# Patient Record
Sex: MALE | Race: WHITE | NOT HISPANIC OR LATINO | Employment: FULL TIME | URBAN - METROPOLITAN AREA
[De-identification: names, ages, dates, MRNs, and addresses within clinical notes are randomized per-mention and may not be internally consistent; named-entity substitution may affect disease eponyms.]

---

## 2018-09-12 ENCOUNTER — TELEPHONE (OUTPATIENT)
Dept: OTOLARYNGOLOGY | Facility: CLINIC | Age: 43
End: 2018-09-12

## 2018-09-12 NOTE — TELEPHONE ENCOUNTER
----- Message from Carole Escalera sent at 9/12/2018  8:09 AM CDT -----  Contact: Self [phone]  Patient will be knew and is requesting to be seen tomorrow for a sore throat and Jaw pain. Please advice

## 2018-09-20 NOTE — PROGRESS NOTES
Chief Complaint   Patient presents with    Sore Throat    Other     possible TMJ   .     HPI: Rl Grubbs is a 42 y.o. male who was self-referred for a sore throat and possible TMJ. He reports that he has an intermittent sore throat. It tends to be worse in the morning.  He states that he has not had any dysphagia. He has hoarseness upon waking.  He notes intermittent throat clearing.   He notes it is worse with chewing and has frequent jaw popping and clicking. He notes tenderness overlying the TMJ region.  He denies cough.    Also reports hearing loss bilaterally.  He states it has been present for 2 years and has been gradually worsening. He notes that he has had bilateral tinnitus. 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  He notes no alleviating or exacerbating factors.     History reviewed. No pertinent past medical history.  Social History     Socioeconomic History    Marital status:      Spouse name: Not on file    Number of children: Not on file    Years of education: Not on file    Highest education level: Not on file   Social Needs    Financial resource strain: Not on file    Food insecurity - worry: Not on file    Food insecurity - inability: Not on file    Transportation needs - medical: Not on file    Transportation needs - non-medical: Not on file   Occupational History    Not on file   Tobacco Use    Smoking status: Not on file   Substance and Sexual Activity    Alcohol use: Not on file    Drug use: Not on file    Sexual activity: Not on file   Other Topics Concern    Not on file   Social History Narrative    Not on file     History reviewed. No pertinent surgical history.  History reviewed. No pertinent family history.        Review of Systems  General: negative for chills, fever or weight loss  Psychological: negative for mood changes or depression  Ophthalmic: negative for blurry vision, photophobia or eye pain  ENT: see  HPI  Respiratory: no cough, shortness of breath, or wheezing  Cardiovascular: no chest pain or dyspnea on exertion  Gastrointestinal: no abdominal pain, change in bowel habits, or black/ bloody stools  Musculoskeletal: negative for gait disturbance or muscular weakness  Neurological: no syncope or seizures; no ataxia  Dermatological: negative for puritis,  rash and jaundice  Hematologic/lymphatic: no easy bruising, no new lumps or bumps      Physical Exam:    Vitals:    09/21/18 0834   BP: 115/83   Pulse: 81   Temp: 97.3 °F (36.3 °C)       Constitutional: Well appearing / communicating without difficutly.  NAD.  Eyes: EOM I Bilaterally  Head/Face: Normocephalic.  Negative paranasal sinus pressure/tenderness.  Salivary glands WNL.  House Brackmann I Bilaterally.    Right Ear: Auricle normal appearance. External Auditory Canal within normal limits no lesions or masses,TM w/o masses/lesions/perforations. TM mobility noted.   Left Ear: Auricle normal appearance. External Auditory Canal within normal limits no lesions or masses,TM w/o masses/lesions/perforations. TM mobility noted.  Nose: No gross nasal septal deviation. Inferior Turbinates 3+ bilaterally. No septal perforation. No masses/lesions. External nasal skin appears normal without masses/lesions.  Oral Cavity: Gingiva/lips within normal limits.  Dentition/gingiva healthy appearing. Mucus membranes moist. Floor of mouth soft, no masses palpated. Oral Tongue mobile. Hard Palate appears normal.    Oropharynx: Base of tongue appears normal. No masses/lesions noted. Tonsillar fossa/pharyngeal wall without lesions. Posterior oropharynx WNL.  Soft palate without masses. Midline uvula.   Neck/Lymphatic: No LAD I-VI bilaterally.  No thyromegaly.  No masses noted on exam.    Mirror laryngoscopy/nasopharyngoscopy: Active gag reflex.  Unable to perform.    Neuro/Psychiatric: AOx3.  Normal mood and affect.   Cardiovascular: Normal carotid pulses bilaterally, no increasing  jugular venous distention noted at cervical region bilaterally.    Respiratory: Normal respiratory effort, no stridor, no retractions noted.    See separate procedure note for FFL.         Audiogram reviewed personally by myself and in detail with the patient today.               Assessment:    ICD-10-CM ICD-9-CM    1. Laryngopharyngeal reflux (LPR) K21.9 478.79    2. Hoarseness of voice R49.0 784.42    3. Bilateral temporomandibular joint pain M26.623 524.62    4. Sensorineural hearing loss (SNHL) of both ears H90.3 389.18 COMPREHENSIVE AUDIOGRAM   5. Tinnitus aurium, bilateral H93.13 388.31      The primary encounter diagnosis was Laryngopharyngeal reflux (LPR). Diagnoses of Hoarseness of voice, Bilateral temporomandibular joint pain, Sensorineural hearing loss (SNHL) of both ears, and Tinnitus aurium, bilateral were also pertinent to this visit.      Plan:  Orders Placed This Encounter   Procedures    COMPREHENSIVE AUDIOGRAM     I had a long discussion with the patient regarding his condition and the further workup and management options.  The patient has the physical findings of chronic laryngopharyngeal reflux, which I believe is the cause of the hoarseness. I explained to the patient how it is common to experience throat discomfort, a foreign body sensation, problems swallowing, chronic cough and hoarseness among other things due to reflux, even in the absence of heartburn. Smaller volumes of gastric contents are required to irritate the pharynx than the lower esophagus. Often patients with significant reflux and heartburn will seek medical treatment earlier.     I recommended that the patient start taking Nexium 40mg twice daily and provided a prescription.    I also recommended that the patient refrain from eating within 3 hours of going to bed at night and that the patient place a 2 x 4 underneath the head board of the bed to elevate the head of bed very subtly and optimize the impact of gravity on the  potential reflux.  I recommended that the patient avoid alcohol, caffeine, tobacco, tomato sauce, spicy foods, fried food, and chocolate.  Follow up in 4-6 weeks to assess progress with treatment.     We discussed  the underlying pathology of temporomandibular joint dysfunction (TMD) as the cause of ear pain.  We further discussed conservative measures to treat TMD including avoiding gum and other foods that require lots of chewing, warm compresses, and scheduled antinflammatories.  If the pain persists, the patient will then schedule an appointment with a dentist for further evaluation.    We reviewed the patient's recent audiogram and hearing loss in detail.  We also discussed the use hearing protection when exposed to loud noise, including lawn equipment. Recommend annual audiogram. We also discussed that tinnitus is most often caused by a hearing loss, and that as the hair cells are damaged, either genetic or as a result of loud noise exposure, they then cause tinnitus.  Some patients find that restricting the salt or caffeine in their diet helps.  Tinnitus tends to be louder in times of stress and fatigue, and may decrease with time.  Sound machines may also be an effective masking technique if needed at night.    45 minutes was spent with the patient with more than half of the time spent counseling regarding above.     Lorri Hemphill MD

## 2018-09-21 ENCOUNTER — CLINICAL SUPPORT (OUTPATIENT)
Dept: OTOLARYNGOLOGY | Facility: CLINIC | Age: 43
End: 2018-09-21
Payer: COMMERCIAL

## 2018-09-21 ENCOUNTER — OFFICE VISIT (OUTPATIENT)
Dept: OTOLARYNGOLOGY | Facility: CLINIC | Age: 43
End: 2018-09-21
Payer: COMMERCIAL

## 2018-09-21 VITALS
HEART RATE: 81 BPM | SYSTOLIC BLOOD PRESSURE: 115 MMHG | TEMPERATURE: 97 F | DIASTOLIC BLOOD PRESSURE: 83 MMHG | WEIGHT: 221.56 LBS

## 2018-09-21 DIAGNOSIS — K21.9 LARYNGOPHARYNGEAL REFLUX (LPR): Primary | ICD-10-CM

## 2018-09-21 DIAGNOSIS — R49.0 HOARSENESS OF VOICE: ICD-10-CM

## 2018-09-21 DIAGNOSIS — H90.3 SENSORINEURAL HEARING LOSS (SNHL) OF BOTH EARS: ICD-10-CM

## 2018-09-21 DIAGNOSIS — H93.13 TINNITUS AURIUM, BILATERAL: ICD-10-CM

## 2018-09-21 DIAGNOSIS — M26.623 BILATERAL TEMPOROMANDIBULAR JOINT PAIN: ICD-10-CM

## 2018-09-21 DIAGNOSIS — H90.3 SENSORINEURAL HEARING LOSS, BILATERAL: Primary | ICD-10-CM

## 2018-09-21 PROCEDURE — 92557 COMPREHENSIVE HEARING TEST: CPT | Mod: S$GLB,,, | Performed by: AUDIOLOGIST-HEARING AID FITTER

## 2018-09-21 PROCEDURE — 99999 PR PBB SHADOW E&M-EST. PATIENT-LVL III: CPT | Mod: PBBFAC,,, | Performed by: OTOLARYNGOLOGY

## 2018-09-21 PROCEDURE — 92567 TYMPANOMETRY: CPT | Mod: S$GLB,,, | Performed by: AUDIOLOGIST-HEARING AID FITTER

## 2018-09-21 PROCEDURE — 99999 PR PBB SHADOW E&M-EST. PATIENT-LVL I: CPT | Mod: PBBFAC,,, | Performed by: AUDIOLOGIST-HEARING AID FITTER

## 2018-09-21 PROCEDURE — 99204 OFFICE O/P NEW MOD 45 MIN: CPT | Mod: 25,S$GLB,, | Performed by: OTOLARYNGOLOGY

## 2018-09-21 PROCEDURE — 31575 DIAGNOSTIC LARYNGOSCOPY: CPT | Mod: S$GLB,,, | Performed by: OTOLARYNGOLOGY

## 2018-09-21 RX ORDER — CETIRIZINE HYDROCHLORIDE 10 MG/1
10 TABLET ORAL DAILY
COMMUNITY

## 2018-09-21 RX ORDER — IRBESARTAN 300 MG/1
300 TABLET ORAL DAILY
Refills: 4 | COMMUNITY
Start: 2018-08-27 | End: 2019-09-26 | Stop reason: SDUPTHER

## 2018-09-21 RX ORDER — BUPROPION HYDROCHLORIDE 300 MG/1
300 TABLET ORAL DAILY
Refills: 1 | COMMUNITY
Start: 2018-08-27 | End: 2020-01-21 | Stop reason: SDUPTHER

## 2018-09-21 RX ORDER — FENOFIBRATE 160 MG/1
160 TABLET ORAL DAILY
COMMUNITY
End: 2020-02-18 | Stop reason: SDUPTHER

## 2018-09-21 RX ORDER — ESOMEPRAZOLE MAGNESIUM 40 MG/1
40 CAPSULE, DELAYED RELEASE ORAL
Qty: 60 CAPSULE | Refills: 1 | Status: SHIPPED | OUTPATIENT
Start: 2018-09-21 | End: 2019-10-28 | Stop reason: SDUPTHER

## 2018-09-21 RX ORDER — ESOMEPRAZOLE MAGNESIUM 40 MG/1
40 CAPSULE, DELAYED RELEASE ORAL DAILY
Refills: 1 | COMMUNITY
Start: 2018-08-27 | End: 2018-09-21 | Stop reason: SDUPTHER

## 2018-09-21 RX ORDER — GLUCOSAM/CHONDRO/HERB 149/HYAL 750-100 MG
1000 TABLET ORAL DAILY
COMMUNITY

## 2018-09-21 NOTE — PATIENT INSTRUCTIONS
Tips to Control Acid Reflux    To control acid reflux, youll need to make some basic diet and lifestyle changes. The simple steps outlined below may be all youll need to ease discomfort.  Watch what you eat  · Avoid fatty foods and spicy foods.  · Eat fewer acidic foods, such as citrus and tomato-based foods. These can increase symptoms.  · Limit drinking alcohol, caffeine, and fizzy beverages. All increase acid reflux.  · Try limiting chocolate, peppermint, and spearmint. These can worsen acid reflux in some people.  Watch when you eat  · Avoid lying down for 3 hours after eating.  · Do not snack before going to bed.  Raise your head  Raising your head and upper body by 4 to 6 inches helps limit reflux when youre lying down. Put blocks under the head of your bed frame to raise it.  Other changes  · Lose weight, if you need to  · Dont exercise near bedtime  · Avoid tight-fitting clothes  · Limit aspirin and ibuprofen  · Stop smoking   Date Last Reviewed: 7/1/2016 © 2000-2017 [employer]. [address] [place] [address], [place] [address]. All rights reserved. This information is not intended as a substitute for professional medical care. Always follow your healthcare professional's instructions.      How Acid Reflux Affects Your Throat    Do you have to clear your throat or cough often? Are you hoarse? Do you have trouble swallowing? If you have these or other throat symptoms, you may have acid reflux. This occurs when stomach acid flows back up and irritates your throat.  Why you have throat symptoms  There are muscles (esophageal sphincters) at both ends of the tube that carries food to your stomach (the esophagus). These muscles relax to let food pass. Then they tighten to keep stomach acid down. When the lower esophageal sphincter (LES) doesnt tighten enough, acid can flow back (reflux) from your stomach into your esophagus. This may cause heartburn. In some cases the upper esophageal  sphincter (UES) also doesnt work well. Then acid can travel higher and enter your throat (pharynx). In many cases, this causes throat symptoms.  Common throat symptoms  · Need to clear your throat often  · Feeling like youre choking  · Long-term (chronic) cough  · Hoarseness  · Trouble swallowing  · Feel like you have a lump in your throat  · Sour or acid taste  · Sore throat that keeps coming back   Date Last Reviewed: 7/1/2016  © 6769-1467 [employer]. [address] [place] [address], [place] [address]. All rights reserved. This information is not intended as a substitute for professional medical care. Always follow your healthcare professional's instructions.

## 2018-09-21 NOTE — PROGRESS NOTES
Cheli Muse, [facility]  [facility] [address]  [address]  [place] [address]      Patient: Rl Grubbs   MRN: [id number]  : 1975  BARBOZA: 2018       AUDIOLOGICAL EVALUATION    CASE HISTORY:    Rl Grubbs, 42 y.o., was seen on the above date for an audiological evaluation. Patient reported a history of occupational and recreational noise exposure. He also reported some difficulty hearing his wife and tinnitus. No further history significant to hearing loss was reported.    TEST RESULTS:    Otoscopy was unremarkable for both ears. Imittance testing revealed normal middle ear compliance (Type A) in both ears. Speech reception thresholds were obtained at 10 dB HL and 5 dB HL, in the right and left ears, respectively, which was consistent with pure tone results. A word recognition score of 100% was obtained in the right ear using a presentation level of 50 dBHL. A left ear word recognition score of 96% correct was obtained using a presentation level of 45 dBHL.     IMPRESSION:   Audiological testing indicated that Rl Grubbs has a mild to moderate, high-frequency sensorineural hearing loss in both ears.    RECOMMENDATIONS:   It is recommended that he:  Continue to receive audiological monitoring annually.  Consider binaural hearing aids in the future.  Wear hearing protection when around loud noises.     If you should have any questions or concerns regarding the above information, please do not hesitate to contact me at [phone].      _______________________________  Yamile Muse, CCC-A  Clinical Audiologist    enclosure: audiogram

## 2018-09-21 NOTE — PROCEDURES
Procedures     Due to indication in patient's history, presentation or risk factors,  a fiber optic exam was performed.    SEPARATE PROCEDURE NOTE:    ANESTHESIA:  Topical xylocaine with hieu-synephrine    FINDINGS:  Moderate inaterarytenoid erythema and edema    PROCEDURE:  After verbal consent was obtained, the flexible scope was passed through the patient's nasal cavity without difficulty.  The nasopharynx (adenoid pad) and eustachian tube orifices were first visualized and were found to be normal, without masses or irregularity.  The posterior pharyngeal wall and base of tongue were then examined and no mass or irregular tissue was seen.  The scope was then advanced to the larynx, and the epiglottis, valleculae, and piriform sinuses were normal, without masses or mucosal irregularity.  The false vocal folds and true vocal folds were then examined and were found to have normal mobility (full abduction and adduction) and no masses or mucosal irregularity was seen.  The interartyenoid area had moderate  edema and erythema consistent with reflux.

## 2019-08-05 ENCOUNTER — PATIENT MESSAGE (OUTPATIENT)
Dept: FAMILY MEDICINE | Facility: CLINIC | Age: 44
End: 2019-08-05

## 2019-08-15 ENCOUNTER — PATIENT MESSAGE (OUTPATIENT)
Dept: FAMILY MEDICINE | Facility: CLINIC | Age: 44
End: 2019-08-15

## 2019-09-03 ENCOUNTER — PATIENT MESSAGE (OUTPATIENT)
Dept: FAMILY MEDICINE | Facility: CLINIC | Age: 44
End: 2019-09-03

## 2019-09-05 ENCOUNTER — OFFICE VISIT (OUTPATIENT)
Dept: FAMILY MEDICINE | Facility: CLINIC | Age: 44
End: 2019-09-05
Payer: COMMERCIAL

## 2019-09-05 VITALS
OXYGEN SATURATION: 98 % | RESPIRATION RATE: 18 BRPM | SYSTOLIC BLOOD PRESSURE: 124 MMHG | BODY MASS INDEX: 33.79 KG/M2 | DIASTOLIC BLOOD PRESSURE: 80 MMHG | TEMPERATURE: 98 F | WEIGHT: 241.38 LBS | HEART RATE: 82 BPM | HEIGHT: 71 IN

## 2019-09-05 DIAGNOSIS — F33.0 DEPRESSION, MAJOR, RECURRENT, MILD: Primary | ICD-10-CM

## 2019-09-05 DIAGNOSIS — G47.33 OSA (OBSTRUCTIVE SLEEP APNEA): ICD-10-CM

## 2019-09-05 DIAGNOSIS — K21.9 GASTROESOPHAGEAL REFLUX DISEASE WITHOUT ESOPHAGITIS: ICD-10-CM

## 2019-09-05 DIAGNOSIS — I10 BENIGN ESSENTIAL HYPERTENSION: ICD-10-CM

## 2019-09-05 DIAGNOSIS — Z12.5 PROSTATE CANCER SCREENING: ICD-10-CM

## 2019-09-05 DIAGNOSIS — E78.2 MIXED HYPERLIPIDEMIA: ICD-10-CM

## 2019-09-05 PROCEDURE — 3008F PR BODY MASS INDEX (BMI) DOCUMENTED: ICD-10-PCS | Mod: CPTII,S$GLB,, | Performed by: INTERNAL MEDICINE

## 2019-09-05 PROCEDURE — 99213 PR OFFICE/OUTPT VISIT, EST, LEVL III, 20-29 MIN: ICD-10-PCS | Mod: S$GLB,,, | Performed by: INTERNAL MEDICINE

## 2019-09-05 PROCEDURE — 3008F BODY MASS INDEX DOCD: CPT | Mod: CPTII,S$GLB,, | Performed by: INTERNAL MEDICINE

## 2019-09-05 PROCEDURE — 99999 PR PBB SHADOW E&M-EST. PATIENT-LVL III: ICD-10-PCS | Mod: PBBFAC,,, | Performed by: INTERNAL MEDICINE

## 2019-09-05 PROCEDURE — 99213 OFFICE O/P EST LOW 20 MIN: CPT | Mod: S$GLB,,, | Performed by: INTERNAL MEDICINE

## 2019-09-05 PROCEDURE — 99999 PR PBB SHADOW E&M-EST. PATIENT-LVL III: CPT | Mod: PBBFAC,,, | Performed by: INTERNAL MEDICINE

## 2019-09-05 NOTE — PROGRESS NOTES
[facility] Note    Chief Complaint      Chief Complaint   Patient presents with    Follow-up     6 month follow up        History of Present Illness      Rl Grubbs is a 43 y.o. male who presents today for   Chief Complaint   Patient presents with    Follow-up     6 month follow up    .  Patient comes to appointment for 6 m checkup for chronic issues as discussed below .  He is compliant  with all meds and diet . Is very active with activity with his job . All health maint is uptodate .     HPI    No problem-specific Assessment & Plan notes found for this encounter.       Problem List Items Addressed This Visit        Psychiatric    Depression, major, recurrent, mild - Primary    Overview     Cont welbutrin xl 300 mg is working and tolerating well             Cardiac/Vascular    Benign essential hypertension    Overview     bp well controled cont all current meds all reviewed          Mixed hyperlipidemia    Overview     Needs cmp and lipid cont fenofibrate             Renal/    Prostate cancer screening    Overview     c heck psa             GI    Gastroesophageal reflux disease without esophagitis    Overview     Cont nexium as needed working well            Other    TOBIN (obstructive sleep apnea)    Overview     Utilizing cpap with great benefit all night every night                  Past Medical History:  History reviewed. No pertinent past medical history.    Past Surgical History:  History reviewed. No pertinent surgical history.    Family History:  family history is not on file.     Social History:  Social History     Socioeconomic History    Marital status:      Spouse name: Not on file    Number of children: Not on file    Years of education: Not on file    Highest education level: Not on file   Occupational History    Not on file   Social Needs    Financial resource strain: Not hard at all    Food insecurity:     Worry: Never true     Inability:  Never true    Transportation needs:     Medical: No     Non-medical: No   Tobacco Use    Smoking status: Never Smoker    Smokeless tobacco: Never Used   Substance and Sexual Activity    Alcohol use: Not on file    Drug use: Never    Sexual activity: Yes     Partners: Female   Lifestyle    Physical activity:     Days per week: 2 days     Minutes per session: 90 min    Stress: Not at all   Relationships    Social connections:     Talks on phone: Twice a week     Gets together: Once a week     Attends Denominational service: Not on file     Active member of club or organization: No     Attends meetings of clubs or organizations: Not on file     Relationship status:    Other Topics Concern    Not on file   Social History Narrative    Not on file       Review of Systems:   Review of Systems   HENT: Negative for hearing loss.    Eyes: Negative for discharge.   Respiratory: Negative for wheezing.    Cardiovascular: Negative for chest pain and palpitations.   Gastrointestinal: Negative for blood in stool, constipation, diarrhea and vomiting.   Genitourinary: Negative for hematuria and urgency.   Musculoskeletal: Negative for neck pain.   Neurological: Negative for weakness and headaches.   Endo/Heme/Allergies: Negative for polydipsia.        Medications:  Outpatient Encounter Medications as of 9/5/2019   Medication Sig Dispense Refill    buPROPion (WELLBUTRIN XL) 300 MG 24 hr tablet Take 300 mg by mouth once daily.  1    cetirizine (ZYRTEC) 10 MG tablet Take 10 mg by mouth once daily.      esomeprazole (NEXIUM) 40 MG capsule Take 1 capsule (40 mg total) by mouth 2 (two) times daily before meals. 60 capsule 1    fenofibrate 160 MG Tab Take 160 mg by mouth once daily.      irbesartan (AVAPRO) 300 MG tablet Take 300 mg by mouth once daily.  4    omega 3-dha-epa-fish oil (FISH OIL) 1,000 mg (120 mg-180 mg) Cap Take 1,000 mg by mouth once daily.       No facility-administered encounter medications on file as  "of 9/5/2019.        Allergies:  Review of patient's allergies indicates:  No Known Allergies      Physical Exam      Vitals:    09/05/19 1421   BP: 124/80   Pulse: 82   Resp: 18   Temp: 98.1 °F (36.7 °C)        Vital Signs  Temp: 98.1 °F (36.7 °C)  Temp src: Oral  Pulse: 82  Resp: 18  SpO2: 98 %  BP: 124/80  BP Location: Right arm  Patient Position: Sitting  Pain Score: 0-No pain  Height and Weight  Height: 5' 11" (180.3 cm)  Weight: 109.5 kg (241 lb 6.5 oz)  BSA (Calculated - sq m): 2.34 sq meters  BMI (Calculated): 33.7  Weight in (lb) to have BMI = 25: 178.9]     Body mass index is 33.67 kg/m².    Physical Exam   Constitutional: He is oriented to person, place, and time. He appears well-developed and well-nourished.   HENT:   Head: Normocephalic.   Eyes: Pupils are equal, round, and reactive to light.   Neck: Normal range of motion. No thyromegaly present.   Cardiovascular: Normal rate and regular rhythm. Exam reveals no gallop and no friction rub.   No murmur heard.  Pulmonary/Chest: Effort normal and breath sounds normal.   Abdominal: Soft. Bowel sounds are normal. He exhibits no distension. There is no hepatosplenomegaly. There is no tenderness.   Musculoskeletal: Normal range of motion. He exhibits tenderness. He exhibits no edema.   tmj   Neurological: He is alert and oriented to person, place, and time. No sensory deficit.   Skin: Skin is warm and dry.   Psychiatric: He has a normal mood and affect. His behavior is normal.        Laboratory:  CBC:  No results for input(s): WBC, RBC, HGB, HCT, PLT, MCV, MCH, MCHC in the last 2160 hours.  CMP:  No results for input(s): GLU, CALCIUM, ALBUMIN, PROT, NA, K, CO2, CL, BUN, ALKPHOS, ALT, AST, BILITOT in the last 2160 hours.    Invalid input(s): CREATININ  URINALYSIS:  No results for input(s): COLORU, CLARITYU, SPECGRAV, PHUR, PROTEINUA, GLUCOSEU, BILIRUBINCON, BLOODU, WBCU, RBCU, BACTERIA, MUCUS, NITRITE, LEUKOCYTESUR, UROBILINOGEN, HYALINECASTS in the last 2160 " hours.   LIPIDS:  No results for input(s): TSH, HDL, CHOL, TRIG, LDLCALC, CHOLHDL, NONHDLCHOL, TOTALCHOLEST in the last 2160 hours.  TSH:  No results for input(s): TSH in the last 2160 hours.  A1C:  No results for input(s): HGBA1C in the last 2160 hours.    Radiology:        Assessment:     Rl Grubbs is a 43 y.o.male with:    Depression, major, recurrent, mild    Mixed hyperlipidemia  -     Lipid panel; Future; Expected date: 09/05/2019  -     Comprehensive metabolic panel; Future; Expected date: 09/05/2019    Benign essential hypertension  -     CBC auto differential; Future; Expected date: 09/05/2019    Gastroesophageal reflux disease without esophagitis    Prostate cancer screening  -     PSA, Screening; Future; Expected date: 09/05/2019    TOBIN (obstructive sleep apnea)                Plan:     As above, continue current medications and maintain follow up with specialists.  Return to clinic in 6 months.    Frederick W Dantagnan [facility]                Answers for HPI/ROS submitted by the patient on 9/3/2019   activity change: No  unexpected weight change: No  rhinorrhea: No  trouble swallowing: No  visual disturbance: No  chest tightness: No  polyuria: No  difficulty urinating: No  joint swelling: No  arthralgias: No  confusion: No  dysphoric mood: No

## 2019-09-10 ENCOUNTER — TELEPHONE (OUTPATIENT)
Dept: FAMILY MEDICINE | Facility: CLINIC | Age: 44
End: 2019-09-10

## 2019-09-10 LAB
ALBUMIN SERPL-MCNC: 4.1 G/DL (ref 3.6–5.1)
ALBUMIN/GLOB SERPL: 1.4 (CALC) (ref 1–2.5)
ALP SERPL-CCNC: 45 U/L (ref 40–115)
ALT SERPL-CCNC: 22 U/L (ref 9–46)
AST SERPL-CCNC: 15 U/L (ref 10–40)
BASOPHILS # BLD AUTO: 58 CELLS/UL (ref 0–200)
BASOPHILS NFR BLD AUTO: 1 %
BILIRUB SERPL-MCNC: 0.6 MG/DL (ref 0.2–1.2)
BUN SERPL-MCNC: 14 MG/DL (ref 7–25)
BUN/CREAT SERPL: ABNORMAL (CALC) (ref 6–22)
CALCIUM SERPL-MCNC: 9.4 MG/DL (ref 8.6–10.3)
CHLORIDE SERPL-SCNC: 101 MMOL/L (ref 98–110)
CHOLEST SERPL-MCNC: 156 MG/DL
CHOLEST/HDLC SERPL: 3.3 (CALC)
CO2 SERPL-SCNC: 28 MMOL/L (ref 20–32)
CREAT SERPL-MCNC: 1.13 MG/DL (ref 0.6–1.35)
EOSINOPHIL # BLD AUTO: 336 CELLS/UL (ref 15–500)
EOSINOPHIL NFR BLD AUTO: 5.8 %
ERYTHROCYTE [DISTWIDTH] IN BLOOD BY AUTOMATED COUNT: 13.1 % (ref 11–15)
GFRSERPLBLD MDRD-ARVRAT: 78 ML/MIN/1.73M2
GLOBULIN SER CALC-MCNC: 2.9 G/DL (CALC) (ref 1.9–3.7)
GLUCOSE SERPL-MCNC: 103 MG/DL (ref 65–99)
HCT VFR BLD AUTO: 43.1 % (ref 38.5–50)
HDLC SERPL-MCNC: 47 MG/DL
HGB BLD-MCNC: 14 G/DL (ref 13.2–17.1)
LDLC SERPL CALC-MCNC: 85 MG/DL (CALC)
LYMPHOCYTES # BLD AUTO: 2059 CELLS/UL (ref 850–3900)
LYMPHOCYTES NFR BLD AUTO: 35.5 %
MCH RBC QN AUTO: 27.8 PG (ref 27–33)
MCHC RBC AUTO-ENTMCNC: 32.5 G/DL (ref 32–36)
MCV RBC AUTO: 85.5 FL (ref 80–100)
MONOCYTES # BLD AUTO: 470 CELLS/UL (ref 200–950)
MONOCYTES NFR BLD AUTO: 8.1 %
NEUTROPHILS # BLD AUTO: 2877 CELLS/UL (ref 1500–7800)
NEUTROPHILS NFR BLD AUTO: 49.6 %
NONHDLC SERPL-MCNC: 109 MG/DL (CALC)
PLATELET # BLD AUTO: 227 THOUSAND/UL (ref 140–400)
PMV BLD REES-ECKER: 9.1 FL (ref 7.5–12.5)
POTASSIUM SERPL-SCNC: 4.3 MMOL/L (ref 3.5–5.3)
PROT SERPL-MCNC: 7 G/DL (ref 6.1–8.1)
PSA SERPL-MCNC: 0.4 NG/ML
RBC # BLD AUTO: 5.04 MILLION/UL (ref 4.2–5.8)
SODIUM SERPL-SCNC: 137 MMOL/L (ref 135–146)
TRIGL SERPL-MCNC: 147 MG/DL
WBC # BLD AUTO: 5.8 THOUSAND/UL (ref 3.8–10.8)

## 2019-09-10 NOTE — TELEPHONE ENCOUNTER
----- Message from Evan Sanford sent at 9/10/2019  8:33 AM CDT -----  Contact: [phone]/self  Patient states he's returning your call   Please call back to assist at [phone]

## 2019-09-26 ENCOUNTER — PATIENT MESSAGE (OUTPATIENT)
Dept: FAMILY MEDICINE | Facility: CLINIC | Age: 44
End: 2019-09-26

## 2019-09-26 RX ORDER — IRBESARTAN 300 MG/1
300 TABLET ORAL DAILY
Qty: 30 TABLET | Refills: 5 | Status: SHIPPED | OUTPATIENT
Start: 2019-09-26 | End: 2020-03-18 | Stop reason: SDUPTHER

## 2019-10-28 RX ORDER — ESOMEPRAZOLE MAGNESIUM 40 MG/1
40 CAPSULE, DELAYED RELEASE ORAL DAILY
Qty: 30 CAPSULE | Refills: 3 | Status: SHIPPED | OUTPATIENT
Start: 2019-10-28 | End: 2020-02-24 | Stop reason: SDUPTHER

## 2020-01-21 ENCOUNTER — PATIENT MESSAGE (OUTPATIENT)
Dept: FAMILY MEDICINE | Facility: CLINIC | Age: 45
End: 2020-01-21

## 2020-01-21 RX ORDER — BUPROPION HYDROCHLORIDE 300 MG/1
300 TABLET ORAL DAILY
Qty: 30 TABLET | Refills: 5 | Status: SHIPPED | OUTPATIENT
Start: 2020-01-21 | End: 2020-03-18 | Stop reason: SDUPTHER

## 2020-01-27 ENCOUNTER — PATIENT MESSAGE (OUTPATIENT)
Dept: FAMILY MEDICINE | Facility: CLINIC | Age: 45
End: 2020-01-27

## 2020-01-27 DIAGNOSIS — M54.16 LUMBAR RADICULOPATHY: Primary | ICD-10-CM

## 2020-01-28 ENCOUNTER — TELEPHONE (OUTPATIENT)
Dept: FAMILY MEDICINE | Facility: CLINIC | Age: 45
End: 2020-01-28

## 2020-01-28 NOTE — TELEPHONE ENCOUNTER
Spoke with pt regarding scheduling referral appointment. Pt stated that he needs to look at schedule to schedule appointment. Pt stated that he will schedule appt on mychart. Informed pt that he may or may not be able to schedule appt on aydee. Pt stated that if he can't schedule on aydee, he will give the office a call.

## 2020-01-28 NOTE — TELEPHONE ENCOUNTER
----- Message from Kendrick Burkett sent at 1/28/2020 10:32 AM CST -----  Contact: [phone] / self   Patient is returning a call from your office. Please Advise.

## 2020-01-29 ENCOUNTER — OFFICE VISIT (OUTPATIENT)
Dept: URGENT CARE | Facility: CLINIC | Age: 45
End: 2020-01-29
Payer: COMMERCIAL

## 2020-01-29 VITALS
TEMPERATURE: 98 F | RESPIRATION RATE: 18 BRPM | WEIGHT: 240 LBS | DIASTOLIC BLOOD PRESSURE: 78 MMHG | BODY MASS INDEX: 33.6 KG/M2 | OXYGEN SATURATION: 99 % | HEIGHT: 71 IN | SYSTOLIC BLOOD PRESSURE: 123 MMHG | HEART RATE: 79 BPM

## 2020-01-29 DIAGNOSIS — B96.89 ACUTE BACTERIAL SINUSITIS: Primary | ICD-10-CM

## 2020-01-29 DIAGNOSIS — J01.90 ACUTE BACTERIAL SINUSITIS: Primary | ICD-10-CM

## 2020-01-29 LAB
CTP QC/QA: YES
FLUAV AG NPH QL: NEGATIVE
FLUBV AG NPH QL: NEGATIVE

## 2020-01-29 PROCEDURE — 96372 PR INJECTION,THERAP/PROPH/DIAG2ST, IM OR SUBCUT: ICD-10-PCS | Mod: S$GLB,,, | Performed by: FAMILY MEDICINE

## 2020-01-29 PROCEDURE — 87804 INFLUENZA ASSAY W/OPTIC: CPT | Mod: QW,S$GLB,, | Performed by: FAMILY MEDICINE

## 2020-01-29 PROCEDURE — 99214 OFFICE O/P EST MOD 30 MIN: CPT | Mod: 25,S$GLB,, | Performed by: FAMILY MEDICINE

## 2020-01-29 PROCEDURE — 99214 PR OFFICE/OUTPT VISIT, EST, LEVL IV, 30-39 MIN: ICD-10-PCS | Mod: 25,S$GLB,, | Performed by: FAMILY MEDICINE

## 2020-01-29 PROCEDURE — 87804 POCT INFLUENZA A/B: ICD-10-PCS | Mod: 59,QW,S$GLB, | Performed by: FAMILY MEDICINE

## 2020-01-29 PROCEDURE — 96372 THER/PROPH/DIAG INJ SC/IM: CPT | Mod: S$GLB,,, | Performed by: FAMILY MEDICINE

## 2020-01-29 RX ORDER — FLUTICASONE PROPIONATE 50 MCG
1 SPRAY, SUSPENSION (ML) NASAL DAILY
Qty: 9.9 ML | Refills: 0 | Status: SHIPPED | OUTPATIENT
Start: 2020-01-29 | End: 2020-09-10

## 2020-01-29 RX ORDER — AMOXICILLIN AND CLAVULANATE POTASSIUM 875; 125 MG/1; MG/1
1 TABLET, FILM COATED ORAL 2 TIMES DAILY
Qty: 20 TABLET | Refills: 0 | Status: SHIPPED | OUTPATIENT
Start: 2020-01-29 | End: 2020-02-08

## 2020-01-29 RX ORDER — BETAMETHASONE SODIUM PHOSPHATE AND BETAMETHASONE ACETATE 3; 3 MG/ML; MG/ML
6 INJECTION, SUSPENSION INTRA-ARTICULAR; INTRALESIONAL; INTRAMUSCULAR; SOFT TISSUE
Status: COMPLETED | OUTPATIENT
Start: 2020-01-29 | End: 2020-01-29

## 2020-01-29 RX ADMIN — BETAMETHASONE SODIUM PHOSPHATE AND BETAMETHASONE ACETATE 6 MG: 3; 3 INJECTION, SUSPENSION INTRA-ARTICULAR; INTRALESIONAL; INTRAMUSCULAR; SOFT TISSUE at 11:01

## 2020-01-29 NOTE — PATIENT INSTRUCTIONS
Sinusitis (Antibiotic Treatment)    The sinuses are air-filled spaces within the bones of the face. They connect to the inside of the nose. Sinusitis is an inflammation of the tissue lining the sinus cavity. Sinus inflammation can occur during a cold. It can also be due to allergies to pollens and other particles in the air. Sinusitis can cause symptoms of sinus congestion and fullness. A sinus infection causes fever, headache and facial pain. There is often green or yellow drainage from the nose or into the back of the throat (post-nasal drip). You have been given antibiotics to treat this condition.  Home care:  · Take the full course of antibiotics as instructed. Do not stop taking them, even if you feel better.  · Drink plenty of water, hot tea, and other liquids. This may help thin mucus. It also may promote sinus drainage.  · Heat may help soothe painful areas of the face. Use a towel soaked in hot water. Or,  the shower and direct the hot spray onto your face. Using a vaporizer along with a menthol rub at night may also help.   · An expectorant containing guaifenesin may help thin the mucus and promote drainage from the sinuses.  · Over-the-counter decongestants may be used unless a similar medicine was prescribed. Nasal sprays work the fastest. Use one that contains phenylephrine or oxymetazoline. First blow the nose gently. Then use the spray. Do not use these medicines more often than directed on the label or symptoms may get worse. You may also use tablets containing pseudoephedrine. Avoid products that combine ingredients, because side effects may be increased. Read labels. You can also ask the pharmacist for help. (NOTE: Persons with high blood pressure should not use decongestants. They can raise blood pressure.)  · Over-the-counter antihistamines may help if allergies contributed to your sinusitis.    · Do not use nasal rinses or irrigation during an acute sinus infection, unless told to by  your health care provider. Rinsing may spread the infection to other sinuses.  · Use acetaminophen or ibuprofen to control pain, unless another pain medicine was prescribed. (If you have chronic liver or kidney disease or ever had a stomach ulcer, talk with your doctor before using these medicines. Aspirin should never be used in anyone under 18 years of age who is ill with a fever. It may cause severe liver damage.)  · Don't smoke. This can worsen symptoms.  Follow-up care  Follow up with your healthcare provider or our staff if you are not improving within the next week.  When to seek medical advice  Call your healthcare provider if any of these occur:  · Facial pain or headache becoming more severe  · Stiff neck  · Unusual drowsiness or confusion  · Swelling of the forehead or eyelids  · Vision problems, including blurred or double vision  · Fever of 100.4ºF (38ºC) or higher, or as directed by your healthcare provider  · Seizure  · Breathing problems  · Symptoms not resolving within 10 days  Date Last Reviewed: 4/13/2015  © 0566-0209 [employer]. [address] [place] [address], [place] [address]. All rights reserved. This information is not intended as a substitute for professional medical care. Always follow your healthcare professional's instructions.

## 2020-01-29 NOTE — PROGRESS NOTES
"Subjective:       Patient ID: Rl Grubbs is a 44 y.o. male.    Vitals:  height is 5' 11" (1.803 m) and weight is 108.9 kg (240 lb). His temperature is 97.8 °F (36.6 °C). His blood pressure is 123/78 and his pulse is 79. His respiration is 18 and oxygen saturation is 99%.     Chief Complaint: Cough and Sinus Problem    Patient presents with symptoms that started on Sunday sore throat, sinus pressure, ear pain and clogged low grade fever last. Patient has had a flu vaccine this year.     Otalgia    There is pain in both ears. This is a new problem. The current episode started in the past 7 days. The problem occurs constantly. The problem has been gradually worsening. The maximum temperature recorded prior to his arrival was 100.4 - 100.9 F. The pain is mild. Associated symptoms include coughing, headaches, rhinorrhea and a sore throat. Pertinent negatives include no rash or vomiting. He has tried nothing for the symptoms. The treatment provided no relief.       Constitution: Positive for fever. Negative for chills, sweating and fatigue.   HENT: Positive for ear pain, congestion, sinus pain, sinus pressure, sore throat and voice change.    Neck: Positive for painful lymph nodes.   Eyes: Negative for eye redness.   Respiratory: Positive for cough. Negative for chest tightness, sputum production, bloody sputum, COPD, shortness of breath, stridor, wheezing and asthma.    Gastrointestinal: Negative for nausea and vomiting.   Musculoskeletal: Negative for muscle ache.   Skin: Negative for rash.   Allergic/Immunologic: Negative for seasonal allergies and asthma.   Neurological: Positive for headaches.   Hematologic/Lymphatic: Positive for swollen lymph nodes.       Objective:      Physical Exam   Constitutional: He appears well-developed and well-nourished.   HENT:   Head: Normocephalic and atraumatic.   Right Ear: Tympanic membrane is bulging.   Left Ear: Tympanic membrane is bulging.   Nose: Mucosal edema and " rhinorrhea present.   Mouth/Throat: Posterior oropharyngeal erythema present.   Eyes: Pupils are equal, round, and reactive to light. EOM are normal.   Neck: Normal range of motion. Neck supple.   Cardiovascular: Normal rate, regular rhythm and normal heart sounds.   Pulmonary/Chest: Effort normal.   Abdominal: Soft.   Nursing note and vitals reviewed.    Results for orders placed or performed in visit on 01/29/20   POCT Influenza A/B   Result Value Ref Range    Rapid Influenza A Ag Negative Negative    Rapid Influenza B Ag Negative Negative     Acceptable Yes          Assessment:       1. Acute bacterial sinusitis        Plan:         Acute bacterial sinusitis  -     POCT Influenza A/B  -     fluticasone propionate (FLONASE) 50 mcg/actuation nasal spray; 1 spray (50 mcg total) by Each Nostril route once daily.  Dispense: 9.9 mL; Refill: 0  -     amoxicillin-clavulanate 875-125mg (AUGMENTIN) 875-125 mg per tablet; Take 1 tablet by mouth 2 (two) times daily. for 10 days  Dispense: 20 tablet; Refill: 0  -     betamethasone acetate-betamethasone sodium phosphate injection 6 mg

## 2020-02-18 ENCOUNTER — PATIENT MESSAGE (OUTPATIENT)
Dept: FAMILY MEDICINE | Facility: CLINIC | Age: 45
End: 2020-02-18

## 2020-02-18 RX ORDER — FENOFIBRATE 160 MG/1
160 TABLET ORAL DAILY
Qty: 30 TABLET | Refills: 5 | Status: SHIPPED | OUTPATIENT
Start: 2020-02-18 | End: 2020-03-18 | Stop reason: SDUPTHER

## 2020-02-24 RX ORDER — ESOMEPRAZOLE MAGNESIUM 40 MG/1
40 CAPSULE, DELAYED RELEASE ORAL DAILY
Qty: 90 CAPSULE | Refills: 0 | Status: SHIPPED | OUTPATIENT
Start: 2020-02-24 | End: 2020-05-22 | Stop reason: SDUPTHER

## 2020-03-05 ENCOUNTER — OFFICE VISIT (OUTPATIENT)
Dept: FAMILY MEDICINE | Facility: CLINIC | Age: 45
End: 2020-03-05
Payer: COMMERCIAL

## 2020-03-05 VITALS
TEMPERATURE: 99 F | HEART RATE: 95 BPM | WEIGHT: 247.56 LBS | OXYGEN SATURATION: 95 % | SYSTOLIC BLOOD PRESSURE: 124 MMHG | BODY MASS INDEX: 34.53 KG/M2 | DIASTOLIC BLOOD PRESSURE: 86 MMHG

## 2020-03-05 DIAGNOSIS — K43.9 VENTRAL HERNIA WITHOUT OBSTRUCTION OR GANGRENE: ICD-10-CM

## 2020-03-05 DIAGNOSIS — F33.0 DEPRESSION, MAJOR, RECURRENT, MILD: ICD-10-CM

## 2020-03-05 DIAGNOSIS — E78.2 MIXED HYPERLIPIDEMIA: ICD-10-CM

## 2020-03-05 DIAGNOSIS — I10 BENIGN ESSENTIAL HYPERTENSION: Primary | ICD-10-CM

## 2020-03-05 DIAGNOSIS — G47.33 OSA (OBSTRUCTIVE SLEEP APNEA): ICD-10-CM

## 2020-03-05 DIAGNOSIS — K21.9 GASTROESOPHAGEAL REFLUX DISEASE WITHOUT ESOPHAGITIS: ICD-10-CM

## 2020-03-05 DIAGNOSIS — M54.50 LUMBAR BACK PAIN: ICD-10-CM

## 2020-03-05 PROCEDURE — 3079F PR MOST RECENT DIASTOLIC BLOOD PRESSURE 80-89 MM HG: ICD-10-PCS | Mod: CPTII,S$GLB,, | Performed by: INTERNAL MEDICINE

## 2020-03-05 PROCEDURE — 3074F PR MOST RECENT SYSTOLIC BLOOD PRESSURE < 130 MM HG: ICD-10-PCS | Mod: CPTII,S$GLB,, | Performed by: INTERNAL MEDICINE

## 2020-03-05 PROCEDURE — 3008F BODY MASS INDEX DOCD: CPT | Mod: CPTII,S$GLB,, | Performed by: INTERNAL MEDICINE

## 2020-03-05 PROCEDURE — 3008F PR BODY MASS INDEX (BMI) DOCUMENTED: ICD-10-PCS | Mod: CPTII,S$GLB,, | Performed by: INTERNAL MEDICINE

## 2020-03-05 PROCEDURE — 99214 PR OFFICE/OUTPT VISIT, EST, LEVL IV, 30-39 MIN: ICD-10-PCS | Mod: S$GLB,,, | Performed by: INTERNAL MEDICINE

## 2020-03-05 PROCEDURE — 99214 OFFICE O/P EST MOD 30 MIN: CPT | Mod: S$GLB,,, | Performed by: INTERNAL MEDICINE

## 2020-03-05 PROCEDURE — 99999 PR PBB SHADOW E&M-EST. PATIENT-LVL IV: ICD-10-PCS | Mod: PBBFAC,,, | Performed by: INTERNAL MEDICINE

## 2020-03-05 PROCEDURE — 3079F DIAST BP 80-89 MM HG: CPT | Mod: CPTII,S$GLB,, | Performed by: INTERNAL MEDICINE

## 2020-03-05 PROCEDURE — 99999 PR PBB SHADOW E&M-EST. PATIENT-LVL IV: CPT | Mod: PBBFAC,,, | Performed by: INTERNAL MEDICINE

## 2020-03-05 PROCEDURE — 3074F SYST BP LT 130 MM HG: CPT | Mod: CPTII,S$GLB,, | Performed by: INTERNAL MEDICINE

## 2020-03-05 NOTE — PROGRESS NOTES
[facility] Note    Chief Complaint      Chief Complaint   Patient presents with    Follow-up     6m f/u on depression, HTN, HLD        History of Present Illness      Rl Grubbs is a 44 y.o. male who presents today for   Chief Complaint   Patient presents with    Follow-up     6m f/u on depression, HTN, HLD    .  Patient comes to appointment here for 6m checkup for chronic issues as discussed in detail below . Is feeling well is compliant with all meds and diet . Main issue is with hernia that he would like referral for eval and treat . He is also having issues with low back pain .     HPI    No problem-specific Assessment & Plan notes found for this encounter.       Problem List Items Addressed This Visit        Psychiatric    Depression, major, recurrent, mild    Overview     Cont welbutrin xl 300 mg is working and tolerating well             Cardiac/Vascular    Benign essential hypertension - Primary    Overview     bp well controled cont all current meds all reviewed          Mixed hyperlipidemia    Overview     Cont  fenofibrate tolerating well             GI    Gastroesophageal reflux disease without esophagitis    Overview     Cont nexium as needed working well         Ventral hernia without obstruction or gangrene    Overview     Needs referral to gen surgeon for eval and treat .            Orthopedic    Lumbar back pain    Overview     Ortho referral for eval and treat             Other    TOBIN (obstructive sleep apnea)    Overview     Utilizing cpap with great benefit all night every night                  Past Medical History:  History reviewed. No pertinent past medical history.    Past Surgical History:  History reviewed. No pertinent surgical history.    Family History:  family history includes Asthma in his mother; COPD in his mother; Cancer in his father; Hearing loss in his mother; Heart disease in his brother and mother; Hypertension in his brother and  mother; Kidney disease in his mother.     Social History:  Social History     Socioeconomic History    Marital status:      Spouse name: Not on file    Number of children: Not on file    Years of education: Not on file    Highest education level: Not on file   Occupational History    Not on file   Social Needs    Financial resource strain: Not hard at all    Food insecurity:     Worry: Never true     Inability: Never true    Transportation needs:     Medical: No     Non-medical: No   Tobacco Use    Smoking status: Never Smoker    Smokeless tobacco: Never Used   Substance and Sexual Activity    Alcohol use: Never     Frequency: 2-4 times a month     Drinks per session: 1 or 2     Binge frequency: Less than monthly    Drug use: Never    Sexual activity: Yes     Partners: Female     Birth control/protection: None   Lifestyle    Physical activity:     Days per week: 2 days     Minutes per session: 90 min    Stress: Not at all   Relationships    Social connections:     Talks on phone: Twice a week     Gets together: Once a week     Attends [employer] service: Not on file     Active member of club or organization: No     Attends meetings of clubs or organizations: Not on file     Relationship status:    Other Topics Concern    Not on file   Social History Narrative    Not on file       Review of Systems:   Review of Systems   HENT: Negative for hearing loss.    Eyes: Negative for discharge.   Respiratory: Negative for wheezing.    Cardiovascular: Negative for chest pain and palpitations.   Gastrointestinal: Negative for blood in stool, constipation, diarrhea and vomiting.   Genitourinary: Negative for hematuria and urgency.   Musculoskeletal: Positive for back pain. Negative for neck pain.   Neurological: Negative for weakness and headaches.   Endo/Heme/Allergies: Negative for polydipsia.        Medications:  Outpatient Encounter Medications as of 3/5/2020   Medication Sig Dispense Refill     buPROPion (WELLBUTRIN XL) 300 MG 24 hr tablet Take 1 tablet (300 mg total) by mouth once daily. 30 tablet 5    cetirizine (ZYRTEC) 10 MG tablet Take 10 mg by mouth once daily.      esomeprazole (NEXIUM) 40 MG capsule Take 1 capsule (40 mg total) by mouth once daily. 90 capsule 0    fenofibrate 160 MG Tab Take 1 tablet (160 mg total) by mouth once daily. 30 tablet 5    fluticasone propionate (FLONASE) 50 mcg/actuation nasal spray 1 spray (50 mcg total) by Each Nostril route once daily. 9.9 mL 0    irbesartan (AVAPRO) 300 MG tablet Take 1 tablet (300 mg total) by mouth once daily. 30 tablet 5    omega 3-dha-epa-fish oil (FISH OIL) 1,000 mg (120 mg-180 mg) Cap Take 1,000 mg by mouth once daily.       No facility-administered encounter medications on file as of 3/5/2020.        Allergies:  Review of patient's allergies indicates:  No Known Allergies      Physical Exam      Vitals:    03/05/20 1453   BP: 124/86   Pulse: 95   Temp: 98.6 °F (37 °C)        Vital Signs  Temp: 98.6 °F (37 °C)  Temp src: Oral  Pulse: 95  SpO2: 95 %  BP: 124/86  BP Location: Left arm  Patient Position: Sitting  Pain Score: 0-No pain  Height and Weight  Weight: 112.3 kg (247 lb 9.2 oz)]     Body mass index is 34.53 kg/m².    Physical Exam   Constitutional: He is oriented to person, place, and time. He appears well-developed and well-nourished.   HENT:   Head: Normocephalic.   Eyes: Pupils are equal, round, and reactive to light.   Neck: Normal range of motion. No thyromegaly present.   Cardiovascular: Normal rate and regular rhythm. Exam reveals no gallop and no friction rub.   No murmur heard.  Pulmonary/Chest: Effort normal and breath sounds normal.   Abdominal: Soft. Bowel sounds are normal. A hernia is present. Hernia confirmed positive in the ventral area.   Musculoskeletal: Normal range of motion. He exhibits no edema.   Neurological: He is alert and oriented to person, place, and time. No sensory deficit.   Skin: Skin is warm  and dry.   Psychiatric: He has a normal mood and affect. His behavior is normal.        Laboratory:  CBC:  No results for input(s): WBC, RBC, HGB, HCT, PLT, MCV, MCH, MCHC in the last 2160 hours.  CMP:  No results for input(s): GLU, CALCIUM, ALBUMIN, PROT, NA, K, CO2, CL, BUN, ALKPHOS, ALT, AST, BILITOT in the last 2160 hours.    Invalid input(s): CREATININ  URINALYSIS:  No results for input(s): COLORU, CLARITYU, SPECGRAV, PHUR, PROTEINUA, GLUCOSEU, BILIRUBINCON, BLOODU, WBCU, RBCU, BACTERIA, MUCUS, NITRITE, LEUKOCYTESUR, UROBILINOGEN, HYALINECASTS in the last 2160 hours.   LIPIDS:  No results for input(s): TSH, HDL, CHOL, TRIG, LDLCALC, CHOLHDL, NONHDLCHOL, TOTALCHOLEST in the last 2160 hours.  TSH:  No results for input(s): TSH in the last 2160 hours.  A1C:  No results for input(s): HGBA1C in the last 2160 hours.    Radiology:        Assessment:     Rl Grubbs is a 44 y.o.male with:    Benign essential hypertension    Gastroesophageal reflux disease without esophagitis    Mixed hyperlipidemia    Depression, major, recurrent, mild    TOBIN (obstructive sleep apnea)    Ventral hernia without obstruction or gangrene  -     Ambulatory referral/consult to General Surgery; Future; Expected date: 03/12/2020    Lumbar back pain  -     Ambulatory referral/consult to Orthopedics; Future; Expected date: 03/12/2020                Plan:     Problem List Items Addressed This Visit        Psychiatric    Depression, major, recurrent, mild    Overview     Cont welbutrin xl 300 mg is working and tolerating well             Cardiac/Vascular    Benign essential hypertension - Primary    Overview     bp well controled cont all current meds all reviewed          Mixed hyperlipidemia    Overview     Cont  fenofibrate tolerating well             GI    Gastroesophageal reflux disease without esophagitis    Overview     Cont nexium as needed working well         Ventral hernia without obstruction or gangrene    Overview      Needs referral to gen surgeon for eval and treat .            Orthopedic    Lumbar back pain    Overview     Ortho referral for eval and treat             Other    TOBIN (obstructive sleep apnea)    Overview     Utilizing cpap with great benefit all night every night                 As above, continue current medications and maintain follow up with specialists.  Return to clinic in 6 months.    Frederick W Dantagnan [facility] [place]                Answers for HPI/ROS submitted by the patient on 3/1/2020   activity change: No  unexpected weight change: No  rhinorrhea: No  trouble swallowing: No  visual disturbance: No  chest tightness: No  polyuria: No  difficulty urinating: No  joint swelling: No  arthralgias: No  confusion: No  dysphoric mood: No

## 2020-03-18 ENCOUNTER — PATIENT MESSAGE (OUTPATIENT)
Dept: FAMILY MEDICINE | Facility: CLINIC | Age: 45
End: 2020-03-18

## 2020-03-18 RX ORDER — IRBESARTAN 300 MG/1
300 TABLET ORAL DAILY
Qty: 30 TABLET | Refills: 5 | Status: CANCELLED | OUTPATIENT
Start: 2020-03-18

## 2020-03-19 RX ORDER — FENOFIBRATE 160 MG/1
160 TABLET ORAL DAILY
Qty: 90 TABLET | Refills: 2 | Status: SHIPPED | OUTPATIENT
Start: 2020-03-19 | End: 2021-01-19

## 2020-03-19 RX ORDER — BUPROPION HYDROCHLORIDE 300 MG/1
300 TABLET ORAL DAILY
Qty: 90 TABLET | Refills: 2 | Status: SHIPPED | OUTPATIENT
Start: 2020-03-19 | End: 2021-01-19

## 2020-03-19 RX ORDER — IRBESARTAN 300 MG/1
300 TABLET ORAL DAILY
Qty: 90 TABLET | Refills: 2 | Status: SHIPPED | OUTPATIENT
Start: 2020-03-19 | End: 2020-12-18

## 2020-03-31 ENCOUNTER — TELEPHONE (OUTPATIENT)
Dept: SURGERY | Facility: CLINIC | Age: 45
End: 2020-03-31

## 2020-03-31 ENCOUNTER — PATIENT MESSAGE (OUTPATIENT)
Dept: SURGERY | Facility: CLINIC | Age: 45
End: 2020-03-31

## 2020-03-31 NOTE — TELEPHONE ENCOUNTER
Left message on patient's voicemail that his appt with Dr. Gastelum scheduled for 4/7/2020 has been cancelled.  There is no face to face clinic visits being scheduled at this time.  He may call this office for a Video Visit with another Surgeon is he desires.  Phone number given for him to call back.

## 2020-04-16 ENCOUNTER — TELEPHONE (OUTPATIENT)
Dept: ORTHOPEDICS | Facility: CLINIC | Age: 45
End: 2020-04-16

## 2020-04-16 DIAGNOSIS — M51.36 DDD (DEGENERATIVE DISC DISEASE), LUMBAR: Primary | ICD-10-CM

## 2020-05-04 ENCOUNTER — PATIENT MESSAGE (OUTPATIENT)
Dept: ORTHOPEDICS | Facility: CLINIC | Age: 45
End: 2020-05-04

## 2020-05-07 ENCOUNTER — PATIENT OUTREACH (OUTPATIENT)
Dept: ADMINISTRATIVE | Facility: OTHER | Age: 45
End: 2020-05-07

## 2020-05-11 ENCOUNTER — OFFICE VISIT (OUTPATIENT)
Dept: ORTHOPEDICS | Facility: CLINIC | Age: 45
End: 2020-05-11
Payer: COMMERCIAL

## 2020-05-11 VITALS — HEIGHT: 71 IN | BODY MASS INDEX: 34.58 KG/M2 | WEIGHT: 247 LBS

## 2020-05-11 DIAGNOSIS — G89.29 CHRONIC BILATERAL LOW BACK PAIN WITHOUT SCIATICA: ICD-10-CM

## 2020-05-11 DIAGNOSIS — M54.50 CHRONIC BILATERAL LOW BACK PAIN WITHOUT SCIATICA: ICD-10-CM

## 2020-05-11 PROCEDURE — 99999 PR PBB SHADOW E&M-EST. PATIENT-LVL III: CPT | Mod: PBBFAC,,, | Performed by: PHYSICIAN ASSISTANT

## 2020-05-11 PROCEDURE — 99999 PR PBB SHADOW E&M-EST. PATIENT-LVL III: ICD-10-PCS | Mod: PBBFAC,,, | Performed by: PHYSICIAN ASSISTANT

## 2020-05-11 PROCEDURE — 99244 OFF/OP CNSLTJ NEW/EST MOD 40: CPT | Mod: S$GLB,,, | Performed by: PHYSICIAN ASSISTANT

## 2020-05-11 PROCEDURE — 99244 PR OFFICE CONSULTATION,LEVEL IV: ICD-10-PCS | Mod: S$GLB,,, | Performed by: PHYSICIAN ASSISTANT

## 2020-05-11 RX ORDER — MELOXICAM 15 MG/1
15 TABLET ORAL DAILY
Qty: 30 TABLET | Refills: 0 | Status: SHIPPED | OUTPATIENT
Start: 2020-05-11 | End: 2020-06-10

## 2020-05-11 NOTE — PROGRESS NOTES
DATE: 5/11/2020  PATIENT: Rl Grubbs    Supervising Physician: Alen Goldsmith M.D.    CHIEF COMPLAINT: back pain    HISTORY:  Rl Grubbs is a 44 y.o. male here for initial evaluation of low back pain (Back - 4, Leg - 0).  The pain has been present for 4-5 months. The patient describes the pain as nagging.  The pain is worse with laying flat and in the morning and improved by sitting in a recliner. There is no associated numbness and tingling. There is no subjective weakness. Prior treatments have included ibuprofen, but no physical therapy, ESIs or surgery.    The patient denies myelopathic symptoms such as handwriting changes or difficulty with buttons/coins/keys. Denies perineal paresthesias, bowel/bladder dysfunction.    PAST MEDICAL/SURGICAL HISTORY:  History reviewed. No pertinent past medical history.  History reviewed. No pertinent surgical history.    Medications:   Current Outpatient Medications on File Prior to Visit   Medication Sig Dispense Refill    buPROPion (WELLBUTRIN XL) 300 MG 24 hr tablet Take 1 tablet (300 mg total) by mouth once daily. 90 tablet 2    cetirizine (ZYRTEC) 10 MG tablet Take 10 mg by mouth once daily.      esomeprazole (NEXIUM) 40 MG capsule Take 1 capsule (40 mg total) by mouth once daily. 90 capsule 0    fenofibrate 160 MG Tab Take 1 tablet (160 mg total) by mouth once daily. 90 tablet 2    fluticasone propionate (FLONASE) 50 mcg/actuation nasal spray 1 spray (50 mcg total) by Each Nostril route once daily. 9.9 mL 0    irbesartan (AVAPRO) 300 MG tablet Take 1 tablet (300 mg total) by mouth once daily. 90 tablet 2    omega 3-dha-epa-fish oil (FISH OIL) 1,000 mg (120 mg-180 mg) Cap Take 1,000 mg by mouth once daily.       No current facility-administered medications on file prior to visit.        Social History:   Social History     Socioeconomic History    Marital status:      Spouse name: Not on file    Number of children: Not on file  "   Years of education: Not on file    Highest education level: Not on file   Occupational History    Not on file   Social Needs    Financial resource strain: Not hard at all    Food insecurity:     Worry: Never true     Inability: Never true    Transportation needs:     Medical: No     Non-medical: No   Tobacco Use    Smoking status: Never Smoker    Smokeless tobacco: Never Used   Substance and Sexual Activity    Alcohol use: Never     Frequency: 2-4 times a month     Drinks per session: 1 or 2     Binge frequency: Less than monthly    Drug use: Never    Sexual activity: Yes     Partners: Female     Birth control/protection: None   Lifestyle    Physical activity:     Days per week: 2 days     Minutes per session: 90 min    Stress: Not at all   Relationships    Social connections:     Talks on phone: Twice a week     Gets together: Once a week     Attends [employer] service: Not on file     Active member of club or organization: No     Attends meetings of clubs or organizations: Not on file     Relationship status:    Other Topics Concern    Not on file   Social History Narrative    Not on file       REVIEW OF SYSTEMS:  Constitution: Negative. Negative for chills, fever and night sweats.   Cardiovascular: Negative for chest pain and syncope.   Respiratory: Negative for cough and shortness of breath.   Gastrointestinal: See HPI. Negative for nausea/vomiting. Negative for abdominal pain.  Genitourinary: See HPI. Negative for discoloration or dysuria.  Skin: Negative for dry skin, itching and rash.   Hematologic/Lymphatic: Negative for bleeding problem. Does not bruise/bleed easily.   Musculoskeletal: Negative for falls and muscle weakness.   Neurological: See HPI. No seizures.   Endocrine: Negative for polydipsia, polyphagia and polyuria.   Allergic/Immunologic: Negative for hives and persistent infections.     EXAM:  Ht 5' 11" (1.803 m)   Wt 112 kg (247 lb)   BMI 34.45 kg/m²     General: The " patient is a very pleasant 44 y.o. male in no apparent distress, the patient is oriented to person, place and time.  Psych: Normal mood and affect  HEENT: Vision grossly intact, hearing intact to the spoken word.  Lungs: Respirations unlabored.  Gait: Normal station and gait, no difficulty with toe or heel walk.   Skin: Dorsal lumbar skin negative for rashes, lesions, hairy patches and surgical scars. There is mild lumbar tenderness to palpation.  Range of motion: Lumbar range of motion is acceptable.  Spinal Balance: Global saggital and coronal spinal balance acceptable, not significant for scoliosis and kyphosis.  Musculoskeletal: No pain with the range of motion of the bilateral hips. No trochanteric tenderness to palpation.  Vascular: Bilateral lower extremities warm and well perfused, dorsalis pedis pulses 2+ bilaterally.  Neurological: Normal strength and tone in all major motor groups in the bilateral lower extremities. Normal sensation to light touch in the L2-S1 dermatomes bilaterally.  Deep tendon reflexes symmetric 2+ in the bilateral lower extremities.  Negative Babinski bilaterally. Straight leg raise negative bilaterally.    IMAGING:      Today I personally reviewed AP, Lat and Flex/Ex  upright L-spine films from an outside facility that demonstrate mild degenerative changes.  No evidence of instability.       Body mass index is 34.45 kg/m².    No results found for: HGBA1C        ASSESSMENT/PLAN:    Rl was seen today for low-back pain.    Diagnoses and all orders for this visit:    Chronic bilateral low back pain without sciatica  -     Ambulatory referral/consult to Orthopedics    Other orders  -     meloxicam (MOBIC) 15 MG tablet; Take 1 tablet (15 mg total) by mouth once daily.    Today we discussed at length all of the different treatment options including anti-inflammatories, acetaminophen, rest, ice, heat, physical therapy including strengthening and stretching exercises, home exercises,  ROM, aerobic conditioning, aqua therapy, other modalities including ultrasound, massage, and dry needling, epidural steroid injections and finally surgical intervention.      Prescription for mobic sent to the pharmacy.  He was given the AAOS spine conditioning exercises to be done at home.  Follow up in 4-6 weeks if symptoms persist.     Follow up if symptoms worsen or fail to improve.     This patient was referred by Dr. Roberto for consult.  A copy of this report will be sent electronically.

## 2020-05-11 NOTE — LETTER
May 11, 2020      Tomer Roberto MD  [address] [facility]  [address]  [facility] [address]           [place] [id number]  [place] [address]  Phone: [phone]          Patient: Rl Grubbs   MR Number: [id number]   YOB: 1975   Date of Visit: 5/11/2020       Dear Dr. Tomer Roberto:    Thank you for referring Rl Grubbs to me for evaluation. Attached you will find relevant portions of my assessment and plan of care.    If you have questions, please do not hesitate to call me. I look forward to following Rl Grubbs along with you.    Sincerely,    Radha Ordonez PA-C    Enclosure  CC:  No Recipients    If you would like to receive this communication electronically, please contact [email or web address] or [phone] to request more information on [employer] access.    For providers and/or their staff who would like to refer a patient to [facility], please contact us through our one-stop-shop provider referral line, [facility] , at [phone].    If you feel you have received this communication in error or would no longer like to receive these types of communications, please e-mail [email or web address]

## 2020-05-22 RX ORDER — ESOMEPRAZOLE MAGNESIUM 40 MG/1
CAPSULE, DELAYED RELEASE ORAL
Qty: 90 CAPSULE | Refills: 0 | Status: SHIPPED | OUTPATIENT
Start: 2020-05-22 | End: 2020-08-24 | Stop reason: SDUPTHER

## 2020-06-14 ENCOUNTER — PATIENT OUTREACH (OUTPATIENT)
Dept: ADMINISTRATIVE | Facility: OTHER | Age: 45
End: 2020-06-14

## 2020-06-16 ENCOUNTER — OFFICE VISIT (OUTPATIENT)
Dept: SURGERY | Facility: CLINIC | Age: 45
End: 2020-06-16
Payer: COMMERCIAL

## 2020-06-16 VITALS
WEIGHT: 248.56 LBS | OXYGEN SATURATION: 99 % | SYSTOLIC BLOOD PRESSURE: 151 MMHG | DIASTOLIC BLOOD PRESSURE: 98 MMHG | BODY MASS INDEX: 34.67 KG/M2 | HEART RATE: 82 BPM | TEMPERATURE: 98 F

## 2020-06-16 DIAGNOSIS — K43.9 VENTRAL HERNIA WITHOUT OBSTRUCTION OR GANGRENE: ICD-10-CM

## 2020-06-16 PROCEDURE — 3080F DIAST BP >= 90 MM HG: CPT | Mod: CPTII,S$GLB,, | Performed by: SURGERY

## 2020-06-16 PROCEDURE — 3008F BODY MASS INDEX DOCD: CPT | Mod: CPTII,S$GLB,, | Performed by: SURGERY

## 2020-06-16 PROCEDURE — 99203 PR OFFICE/OUTPT VISIT, NEW, LEVL III, 30-44 MIN: ICD-10-PCS | Mod: S$GLB,,, | Performed by: SURGERY

## 2020-06-16 PROCEDURE — 99203 OFFICE O/P NEW LOW 30 MIN: CPT | Mod: S$GLB,,, | Performed by: SURGERY

## 2020-06-16 PROCEDURE — 3080F PR MOST RECENT DIASTOLIC BLOOD PRESSURE >= 90 MM HG: ICD-10-PCS | Mod: CPTII,S$GLB,, | Performed by: SURGERY

## 2020-06-16 PROCEDURE — 3008F PR BODY MASS INDEX (BMI) DOCUMENTED: ICD-10-PCS | Mod: CPTII,S$GLB,, | Performed by: SURGERY

## 2020-06-16 PROCEDURE — 3077F SYST BP >= 140 MM HG: CPT | Mod: CPTII,S$GLB,, | Performed by: SURGERY

## 2020-06-16 PROCEDURE — 3077F PR MOST RECENT SYSTOLIC BLOOD PRESSURE >= 140 MM HG: ICD-10-PCS | Mod: CPTII,S$GLB,, | Performed by: SURGERY

## 2020-06-16 PROCEDURE — 99999 PR PBB SHADOW E&M-EST. PATIENT-LVL III: ICD-10-PCS | Mod: PBBFAC,,, | Performed by: SURGERY

## 2020-06-16 PROCEDURE — 99999 PR PBB SHADOW E&M-EST. PATIENT-LVL III: CPT | Mod: PBBFAC,,, | Performed by: SURGERY

## 2020-06-16 NOTE — PROGRESS NOTES
"[facility] - General Surgery  History & Physical    Subjective:      Chief Complaint: ventral hernia    Rl Grubbs is a 44 y.o. male with PMHx of HTN and HLD presents to clinic today complaining of ventral hernia x 8 years. Pt decided to "watch and wait" when originally diagnosed however has noticed the bulge is getting bigger and is more noticeable when wearing clothing so is interested in his surgical options. Sxs are aggravated by bloating, coughing, standing and heavy lifting. Alleviated by laying down. Pt denies pain, nausea, vomiting and changes in bowel movements.      No past medical history on file.  No past surgical history on file.  Family History   Problem Relation Age of Onset    Asthma Mother     COPD Mother     Hearing loss Mother     Heart disease Mother     Hypertension Mother     Kidney disease Mother     Cancer Father     Heart disease Brother     Hypertension Brother      Social History     Tobacco Use    Smoking status: Never Smoker    Smokeless tobacco: Never Used   Substance Use Topics    Alcohol use: Never     Frequency: 2-4 times a month     Drinks per session: 1 or 2     Binge frequency: Less than monthly    Drug use: Never       (Not in a hospital admission)    Review of patient's allergies indicates:  No Known Allergies     Review of Systems   Constitutional: Negative for chills, fever and weight loss.   HENT: Negative.    Eyes: Negative.    Respiratory: Negative.    Cardiovascular: Negative.    Gastrointestinal: Negative for abdominal pain, blood in stool, constipation, diarrhea, nausea and vomiting.   Genitourinary: Negative for dysuria, frequency and urgency.   Musculoskeletal: Negative.    Skin: Negative for itching and rash.   Neurological: Negative.    Endo/Heme/Allergies: Negative.    Psychiatric/Behavioral: Negative.        Objective:      Vital Signs (Most Recent)  Temp: 98.2 °F (36.8 °C) (06/16/20 1534)  Pulse: 82 (06/16/20 1534)  BP: (!) 151/98 (06/16/20 " 1534)  SpO2: 99 % (06/16/20 1534)    Vital Signs Range (Last 24H):  [unfilled]    Physical Exam  Constitutional:       Appearance: Normal appearance. He is normal weight.   HENT:      Head: Normocephalic and atraumatic.      Nose: Nose normal.   Eyes:      Extraocular Movements: Extraocular movements intact.   Neck:      Musculoskeletal: Normal range of motion and neck supple.   Pulmonary:      Effort: Pulmonary effort is normal.   Abdominal:      General: Bowel sounds are normal.      Palpations: Abdomen is soft.      Tenderness: There is no abdominal tenderness.      Hernia: A hernia is present. Hernia is present in the ventral area.   Musculoskeletal: Normal range of motion.   Neurological:      Mental Status: He is alert.         Data Review:    CBC:   Lab Results   Component Value Date    WBC 5.8 09/09/2019    RBC 5.04 09/09/2019    HGB 14.0 09/09/2019    HCT 43.1 09/09/2019     09/09/2019     BMP:   Lab Results   Component Value Date     (H) 09/09/2019     09/09/2019    K 4.3 09/09/2019     09/09/2019    CO2 28 09/09/2019    BUN 14 09/09/2019    CREATININE 1.13 09/09/2019    CALCIUM 9.4 09/09/2019          Plan:      Ventral hernia repair:  - discussed surgical options/ risks/ benefits  - pt will call clinic to schedule surgery for late July/ August    Brett Gastelum MD  Acute Care Surgery  [facility] Department of Surgery

## 2020-06-16 NOTE — LETTER
June 23, 2020      Tomer Roberto MD  [address]  [address]  [place] [address]           [facility]  [address]  [place] [address]  Phone: [phone]          Patient: Rl Grubbs   MR Number: [id number]   YOB: 1975   Date of Visit: 6/16/2020       Dear Dr. Tomer Roberto:    Thank you for referring Rl Grubbs to me for evaluation. Attached you will find relevant portions of my assessment and plan of care.    If you have questions, please do not hesitate to call me. I look forward to following Rl Grubbs along with you.    Sincerely,    Brett Gastelum MD    Enclosure  CC:  No Recipients    If you would like to receive this communication electronically, please contact [email or web address] or [phone] to request more information on [facility] access.    For providers and/or their staff who would like to refer a patient to [facility], please contact us through our one-stop-shop provider referral line, [facility], at [phone].    If you feel you have received this communication in error or would no longer like to receive these types of communications, please e-mail [email or web address]

## 2020-07-29 ENCOUNTER — HOSPITAL ENCOUNTER (OUTPATIENT)
Dept: RADIOLOGY | Facility: HOSPITAL | Age: 45
Discharge: HOME | End: 2020-07-29
Attending: ORTHOPAEDIC SURGERY
Payer: COMMERCIAL

## 2020-07-29 ENCOUNTER — TELEPHONE (OUTPATIENT)
Dept: SPORTS MEDICINE | Facility: CLINIC | Age: 45
End: 2020-07-29

## 2020-07-29 ENCOUNTER — OFFICE VISIT (OUTPATIENT)
Dept: SPORTS MEDICINE | Facility: CLINIC | Age: 45
End: 2020-07-29
Payer: COMMERCIAL

## 2020-07-29 VITALS
WEIGHT: 244 LBS | HEIGHT: 71 IN | HEART RATE: 78 BPM | DIASTOLIC BLOOD PRESSURE: 83 MMHG | SYSTOLIC BLOOD PRESSURE: 130 MMHG | BODY MASS INDEX: 34.16 KG/M2

## 2020-07-29 DIAGNOSIS — M79.672 LEFT FOOT PAIN: ICD-10-CM

## 2020-07-29 DIAGNOSIS — M25.572 LEFT ANKLE PAIN, UNSPECIFIED CHRONICITY: ICD-10-CM

## 2020-07-29 DIAGNOSIS — M25.572 ACUTE LEFT ANKLE PAIN: Primary | ICD-10-CM

## 2020-07-29 DIAGNOSIS — M99.06 SOMATIC DYSFUNCTION OF LOWER EXTREMITY: ICD-10-CM

## 2020-07-29 PROCEDURE — 3075F SYST BP GE 130 - 139MM HG: CPT | Mod: CPTII,S$GLB,, | Performed by: ORTHOPAEDIC SURGERY

## 2020-07-29 PROCEDURE — 99999 PR PBB SHADOW E&M-EST. PATIENT-LVL IV: CPT | Mod: PBBFAC,,, | Performed by: ORTHOPAEDIC SURGERY

## 2020-07-29 PROCEDURE — 99204 OFFICE O/P NEW MOD 45 MIN: CPT | Mod: 25,S$GLB,, | Performed by: ORTHOPAEDIC SURGERY

## 2020-07-29 PROCEDURE — 98925 OSTEOPATH MANJ 1-2 REGIONS: CPT | Mod: S$GLB,,, | Performed by: ORTHOPAEDIC SURGERY

## 2020-07-29 PROCEDURE — 98925 PR OSTEOPATHIC MANIP,1-2 BODY REGN: ICD-10-PCS | Mod: S$GLB,,, | Performed by: ORTHOPAEDIC SURGERY

## 2020-07-29 PROCEDURE — 73610 X-RAY EXAM OF ANKLE: CPT | Mod: 26,LT,, | Performed by: RADIOLOGY

## 2020-07-29 PROCEDURE — 73610 XR ANKLE COMPLETE 3 VIEW LEFT: ICD-10-PCS | Mod: 26,LT,, | Performed by: RADIOLOGY

## 2020-07-29 PROCEDURE — 97110 PR THERAPEUTIC EXERCISES: ICD-10-PCS | Mod: GP,S$GLB,, | Performed by: ORTHOPAEDIC SURGERY

## 2020-07-29 PROCEDURE — 3079F DIAST BP 80-89 MM HG: CPT | Mod: CPTII,S$GLB,, | Performed by: ORTHOPAEDIC SURGERY

## 2020-07-29 PROCEDURE — 73610 X-RAY EXAM OF ANKLE: CPT | Mod: TC,LT

## 2020-07-29 PROCEDURE — 3008F PR BODY MASS INDEX (BMI) DOCUMENTED: ICD-10-PCS | Mod: CPTII,S$GLB,, | Performed by: ORTHOPAEDIC SURGERY

## 2020-07-29 PROCEDURE — 73630 X-RAY EXAM OF FOOT: CPT | Mod: 26,LT,, | Performed by: RADIOLOGY

## 2020-07-29 PROCEDURE — 99999 PR PBB SHADOW E&M-EST. PATIENT-LVL IV: ICD-10-PCS | Mod: PBBFAC,,, | Performed by: ORTHOPAEDIC SURGERY

## 2020-07-29 PROCEDURE — 3075F PR MOST RECENT SYSTOLIC BLOOD PRESS GE 130-139MM HG: ICD-10-PCS | Mod: CPTII,S$GLB,, | Performed by: ORTHOPAEDIC SURGERY

## 2020-07-29 PROCEDURE — 73630 X-RAY EXAM OF FOOT: CPT | Mod: TC,LT

## 2020-07-29 PROCEDURE — 3079F PR MOST RECENT DIASTOLIC BLOOD PRESSURE 80-89 MM HG: ICD-10-PCS | Mod: CPTII,S$GLB,, | Performed by: ORTHOPAEDIC SURGERY

## 2020-07-29 PROCEDURE — 99204 PR OFFICE/OUTPT VISIT, NEW, LEVL IV, 45-59 MIN: ICD-10-PCS | Mod: 25,S$GLB,, | Performed by: ORTHOPAEDIC SURGERY

## 2020-07-29 PROCEDURE — 3008F BODY MASS INDEX DOCD: CPT | Mod: CPTII,S$GLB,, | Performed by: ORTHOPAEDIC SURGERY

## 2020-07-29 PROCEDURE — 73630 XR FOOT COMPLETE 3 VIEW LEFT: ICD-10-PCS | Mod: 26,LT,, | Performed by: RADIOLOGY

## 2020-07-29 PROCEDURE — 97110 THERAPEUTIC EXERCISES: CPT | Mod: GP,S$GLB,, | Performed by: ORTHOPAEDIC SURGERY

## 2020-07-29 RX ORDER — MELOXICAM 15 MG/1
15 TABLET ORAL DAILY
Qty: 30 TABLET | Refills: 0 | Status: SHIPPED | OUTPATIENT
Start: 2020-07-29 | End: 2020-09-07

## 2020-07-29 NOTE — PROGRESS NOTES
CC: left foot pain    44 y.o. Male presents today for evaluation of his left foot pain. Patient ambulated into clinic today with an antalgic gait favoring his left foot. He indicates his pain becomes severe with heel strike. Patient admits to left foot pain beginning 07/28/2020. Patient reports he is unable to recall a specific mechanism of injury, but does recall a history of severe left lateral ankle sprain ten years ago. Patient states he did have a change from his normal shoe attire when he wore low dress shoes to his son's graduation 07/27/2020. When asked where he hurts he gestures to the lateral aspect of the left calcaneus and describes the quality of his pain as sharp, shooting and aching. He states he works as a  and has been able to work despite his pain.   How long: Patient admits to left foot and ankle pain beginning 07/28/2020.  What makes it better: Patient admits to improved pain with his ankle brace and ibuprofen.   What makes it worse: Patient admits to increased pain with weight bearing, primarily during heel strike phase of walking.   Does it radiate: Denies radiating pain.   Attempted treatments: Patient states he has been taking ibuprofen as needed in addition to wearing a compression ankle brace. He denies recent physical therapy. Denies history of ankle injection or surgery. Denies application or topical creams or gels.   Pain score: 5/10  History of trauma/injury: Admits to history of severe ankle sprain approximately ten years ago.   Affecting ADLs: Admits to this problem affecting his ability to perform ADLs.   Any mechanical symptoms: Admits to mechanical symptoms.   Feelings of instability: Admits to feelings of instability.     REVIEW OF SYSTEMS:   Constitution: Patient denies fever, chills, night sweats, and weight changes.  Eyes: Patient denies eye pain or vision changes.  HENT: Patient denies headache, ear pain, sore throat, or nasal discharge.  CVS: Patient  denies chest pain.  Lungs: Patient denies shortness of breath or cough.  Abd: Patient denies stomach pain, nausea, or vomiting.  Skin: Patient denies skin rash or itching.    Hematologic/Lymphatic: Patient denies easy bruising.   Musculoskeletal: Patient denies recent falls. See HPI.  Psych: Patient denies any current anxiety or nervousness.    PAST MEDICAL HISTORY:   History reviewed. No pertinent past medical history.    PAST SURGICAL HISTORY:   History reviewed. No pertinent surgical history.    FAMILY HISTORY:   Family History   Problem Relation Age of Onset    Asthma Mother     COPD Mother     Hearing loss Mother     Heart disease Mother     Hypertension Mother     Kidney disease Mother     Cancer Father     Heart disease Brother     Hypertension Brother        SOCIAL HISTORY:   Social History     Socioeconomic History    Marital status:      Spouse name: Not on file    Number of children: Not on file    Years of education: Not on file    Highest education level: Not on file   Occupational History    Not on file   Social Needs    Financial resource strain: Not hard at all    Food insecurity     Worry: Never true     Inability: Never true    Transportation needs     Medical: No     Non-medical: No   Tobacco Use    Smoking status: Never Smoker    Smokeless tobacco: Never Used   Substance and Sexual Activity    Alcohol use: Never     Frequency: 2-4 times a month     Drinks per session: 1 or 2     Binge frequency: Less than monthly    Drug use: Never    Sexual activity: Yes     Partners: Female     Birth control/protection: None   Lifestyle    Physical activity     Days per week: 2 days     Minutes per session: 90 min    Stress: Not at all   Relationships    Social connections     Talks on phone: Twice a week     Gets together: Once a week     Attends Judaism service: Not on file     Active member of club or organization: No     Attends meetings of clubs or organizations: Not on  "file     Relationship status:    Other Topics Concern    Not on file   Social History Narrative    Not on file       MEDICATIONS:     Current Outpatient Medications:     buPROPion (WELLBUTRIN XL) 300 MG 24 hr tablet, Take 1 tablet (300 mg total) by mouth once daily., Disp: 90 tablet, Rfl: 2    cetirizine (ZYRTEC) 10 MG tablet, Take 10 mg by mouth once daily., Disp: , Rfl:     esomeprazole (NEXIUM) 40 MG capsule, TAKE 1 CAPSULE BY MOUTH ONCE DAILY, Disp: 90 capsule, Rfl: 0    fenofibrate 160 MG Tab, Take 1 tablet (160 mg total) by mouth once daily., Disp: 90 tablet, Rfl: 2    irbesartan (AVAPRO) 300 MG tablet, Take 1 tablet (300 mg total) by mouth once daily., Disp: 90 tablet, Rfl: 2    omega 3-dha-epa-fish oil (FISH OIL) 1,000 mg (120 mg-180 mg) Cap, Take 1,000 mg by mouth once daily., Disp: , Rfl:     fluticasone propionate (FLONASE) 50 mcg/actuation nasal spray, 1 spray (50 mcg total) by Each Nostril route once daily., Disp: 9.9 mL, Rfl: 0    meloxicam (MOBIC) 15 MG tablet, Take 1 tablet (15 mg total) by mouth once daily., Disp: 30 tablet, Rfl: 0    ALLERGIES:   Review of patient's allergies indicates:  No Known Allergies     PHYSICAL EXAMINATION:  /83   Pulse 78   Ht 5' 11" (1.803 m)   Wt 110.7 kg (244 lb)   BMI 34.03 kg/m²   Vitals signs and nursing note have been reviewed.  General: In no acute distress, well developed, well nourished, no diaphoresis  Eyes: EOM full and smooth, no eye redness or discharge  HENT: normocephalic and atraumatic, neck supple, trachea midline, no nasal discharge, no external ear redness or discharge  Cardiovascular: 2+ and symmetric radial and DP pulses bilaterally, no LE edema  Lungs: respirations non-labored, no conversational dyspnea   Abd: non-distended, no rigidity  MSK: no amputation or deformity, no swelling of extremities  Neuro: AAOx3, CN2-12 grossly intact  Skin: No rashes, warm and dry  Psychiatric: cooperative, pleasant, mood and affect " appropriate for age    MUSCULOSKELETAL  Left foot  The affected foot is compared to the contralateral side.    Observation:    There is no edema, erythema, or ecchymosis.   Shoes reveal a normal wear pattern.  No structural deformities including pes planus/cavus, hallux valgus, or toe deformities.  Negative too-many toes sign.   Normal callus pattern on the feet bilaterally.    Achilles tendon and calcaneal insertion reveals no deformities  No leg or intrinsic foot muscle atrophy.   Antalgic gait    ROM: (expected degree)  Active dorsiflexion to 20° left and 20° right.    Active plantarflexion to 50° left and 50° right.    Active ankle inversion to 35° left and 35° right.    Active ankle eversion to 15° left and 15° right.    Full active flexion/extension of the toes right and left.   Heel cords without tightness right and left.    Tenderness To Palpation:  No tenderness at the ATFL, CFL, PTFL, or deltoid ligaments  No tenderness over the distal anterior syndesmosis, distal tibia/fibula, fibular head/shaft  No tenderness at medial or lateral malleoli   No tenderness at navicular, cuboid, cuneiforms, talus  + tenderness at the inferior lateral aspect of the calcaneus  No tenderness along the metatarsals or phalanges  No tenderness at the Achilles tendon calcaneal insertion  No tenderness at the posterior tibial or peroneal tendons    Strength Testing:  Dorsiflexion - 5/5 on left and 5/5 on right  Platarflexion - 5/5 on left and 5/5 on right  Resisted Inversion - 5/5 on left and 5/5 on right  Resisted Eversion - 5/5 on left and 5/5 on right  Great Toe Extension - 5/5 on left and 5/5 on right  Great Toe Flexion - 5/5 on left and 5/5 on right    Special Tests:  Anterior talar drawer - negative and without dimpling  Talar tilt - negative  Reverse talar tilt - negative    Heel tap test - negative  Distal tib/fib squeeze test - negative  Welsh squeeze test - negative    Metatarsal squeeze test - negative  Midfoot stress  test - negative  Calcaneal squeeze test - negative    No subluxation of the peroneal tendons with resisted eversion.    Fascial continuum distortion at the inferior lateral aspect the calcaneus the left    Vascular/Sensory Exam:  DP and PT pulses intact BL  No skin changes, no abnormal hair distribution  Sensation intact to light touch throughout the saphenous, sural, superficial peroneal, deep peroneal, and tibial nerve distributionss  Capillary refill intact <2 seconds in all toes bilaterally.      IMAGIN. X-ray ordered due to left foot pain   2. X-ray images were reviewed personally by me and then directly with patient.  3. FINDINGS: X-ray images obtained demonstrate no fracture dislocation bone destruction seen.  No OCD seen.  No trauma seen.  4. IMPRESSION: No pathology or irregularities appreciated.     1. X-ray ordered due to left foot pain   2. X-ray images were reviewed personally by me and then directly with patient.  3. FINDINGS: X-ray images obtained demonstrate no fracture dislocation bone destruction seen.  No trauma seen.   4. IMPRESSION: No pathology or irregularities appreciated.     ASSESSMENT:      ICD-10-CM ICD-9-CM   1. Acute left ankle pain  M25.572 719.47   2. Left foot pain  M79.672 729.5   3. Somatic dysfunction of lower extremity  M99.06 739.6       PLAN:  1-2.  Acute left ankle pain/left foot pain -     - Pako admits to left foot pain beginning 2020 without known mechanism of injury. He recalls standing for an extended period of time 2020 while wearing dress shoes at his son's high school graduation and believes this may have caused his recent exacerbation in pain as he cannot think of anything else.     - XRs ordered in the office today and images were personally reviewed with the patient. See above for further detail.    - Based on his description of pain/body language and somatic dysfunction identified on exam, we discussed osteopathic manipulation as a treatment  option today. He consents to evaluation and treatment. See below.     - HEP for foot intrinsics, ankle stregnthening prescribed today. Handout provided, explained, and exercises were demonstrated as needed. Encouraged to do daily.  09951 HOME EXERCISE PROGRAM (HEP):  The patient was taught a homegoing physical therapy regimen as described above. The patient demonstrated understanding of the exercises and proper technique of their execution. This interaction took 15 minutes.     - Mobic 15 mg prescribed to be taken once daily for two weeks, followed by as needed.  Advised icing to the affected foot as well for 15-20 minutes once to twice daily.      3.  Somatic dysfunction of lower extremity region -     - OMT 1-2 regions. Oral consent obtained. Reviewed benefits and potential side effects. OMT indicated today due to signs and symptoms as well as local and remote somatic dysfunction findings and their related neurokinetic, lymphatic, fascial and/or arteriovenous body connections. OMT techniques used: Fascial Distortion Model. Treatment was tolerated well. Improvement noted in segmental mobility post-treatment in dysfunctional regions. There were no adverse events and no complications immediately following treatment. Advised plenty of water to help alleviate soreness.      Future planning includes - possibly more OMT if helpful and if indicated, diagnostic ultrasound of the affected area to assess for tendon pathology    All questions were answered to the best of my ability and all concerns were addressed at this time.    Follow up in 1-2 weeks for above, or sooner if needed.      This note is dictated using the M*Modal Fluency Direct word recognition program. There are word recognition mistakes that are occasionally missed on review.

## 2020-07-29 NOTE — TELEPHONE ENCOUNTER
Contacted patient to find laterality of affected extremity. Patient expressed it was his left foot and ankle causing him pain. Understanding was expressed by me and I provided the patient directions to the facility.    Lauren Hyatt MS, OTC  Clinical Assistant to Dr. Dayne Puri

## 2020-08-24 RX ORDER — ESOMEPRAZOLE MAGNESIUM 40 MG/1
40 CAPSULE, DELAYED RELEASE ORAL DAILY
Qty: 90 CAPSULE | Refills: 0 | Status: SHIPPED | OUTPATIENT
Start: 2020-08-24 | End: 2020-11-18

## 2020-09-10 ENCOUNTER — OFFICE VISIT (OUTPATIENT)
Dept: FAMILY MEDICINE | Facility: CLINIC | Age: 45
End: 2020-09-10
Payer: COMMERCIAL

## 2020-09-10 VITALS
DIASTOLIC BLOOD PRESSURE: 84 MMHG | SYSTOLIC BLOOD PRESSURE: 122 MMHG | BODY MASS INDEX: 34.41 KG/M2 | TEMPERATURE: 98 F | HEIGHT: 71 IN | HEART RATE: 78 BPM | WEIGHT: 245.81 LBS | RESPIRATION RATE: 18 BRPM | OXYGEN SATURATION: 99 %

## 2020-09-10 DIAGNOSIS — Z00.00 ANNUAL PHYSICAL EXAM: Primary | ICD-10-CM

## 2020-09-10 DIAGNOSIS — Z11.59 ENCOUNTER FOR HEPATITIS C SCREENING TEST FOR LOW RISK PATIENT: ICD-10-CM

## 2020-09-10 DIAGNOSIS — Z11.4 ENCOUNTER FOR SCREENING FOR HIV: ICD-10-CM

## 2020-09-10 DIAGNOSIS — Z12.5 PROSTATE CANCER SCREENING: ICD-10-CM

## 2020-09-10 PROCEDURE — 3079F PR MOST RECENT DIASTOLIC BLOOD PRESSURE 80-89 MM HG: ICD-10-PCS | Mod: CPTII,S$GLB,, | Performed by: INTERNAL MEDICINE

## 2020-09-10 PROCEDURE — 3008F BODY MASS INDEX DOCD: CPT | Mod: CPTII,S$GLB,, | Performed by: INTERNAL MEDICINE

## 2020-09-10 PROCEDURE — 3079F DIAST BP 80-89 MM HG: CPT | Mod: CPTII,S$GLB,, | Performed by: INTERNAL MEDICINE

## 2020-09-10 PROCEDURE — 3008F PR BODY MASS INDEX (BMI) DOCUMENTED: ICD-10-PCS | Mod: CPTII,S$GLB,, | Performed by: INTERNAL MEDICINE

## 2020-09-10 PROCEDURE — 99396 PREV VISIT EST AGE 40-64: CPT | Mod: S$GLB,,, | Performed by: INTERNAL MEDICINE

## 2020-09-10 PROCEDURE — 99999 PR PBB SHADOW E&M-EST. PATIENT-LVL IV: ICD-10-PCS | Mod: PBBFAC,,, | Performed by: INTERNAL MEDICINE

## 2020-09-10 PROCEDURE — 3074F SYST BP LT 130 MM HG: CPT | Mod: CPTII,S$GLB,, | Performed by: INTERNAL MEDICINE

## 2020-09-10 PROCEDURE — 99396 PR PREVENTIVE VISIT,EST,40-64: ICD-10-PCS | Mod: S$GLB,,, | Performed by: INTERNAL MEDICINE

## 2020-09-10 PROCEDURE — 99999 PR PBB SHADOW E&M-EST. PATIENT-LVL IV: CPT | Mod: PBBFAC,,, | Performed by: INTERNAL MEDICINE

## 2020-09-10 PROCEDURE — 3074F PR MOST RECENT SYSTOLIC BLOOD PRESSURE < 130 MM HG: ICD-10-PCS | Mod: CPTII,S$GLB,, | Performed by: INTERNAL MEDICINE

## 2020-09-10 NOTE — PROGRESS NOTES
[facility] Note    Chief Complaint      Chief Complaint   Patient presents with    Follow-up     6 month follow up        History of Present Illness      Rl Grubbs is a 44 y.o. male who presents today for   Chief Complaint   Patient presents with    Follow-up     6 month follow up    .  Patient comes to appointment here for 6m checkup for chronic issues as discussed in detail below . He is feeling well and is  compliant with all meds . Will be getting flu vaccine with employer . Needs full screening labs with this visit . He is managing cdc guidelines for covid 19.     HPI    No problem-specific Assessment & Plan notes found for this encounter.       Problem List Items Addressed This Visit        Renal/    Prostate cancer screening    Overview     c heck psa             ID    Encounter for hepatitis C screening test for low risk patient    Overview     Hep c screen          Encounter for screening for HIV    Overview     Hiv screen             Other    Annual physical exam - Primary    Overview     pe documented needs full screening labs                 Past Medical History:  History reviewed. No pertinent past medical history.    Past Surgical History:  History reviewed. No pertinent surgical history.    Family History:  family history includes Asthma in his mother; COPD in his mother; Cancer in his father; Hearing loss in his mother; Heart disease in his brother and mother; Hypertension in his brother and mother; Kidney disease in his mother.     Social History:  Social History     Socioeconomic History    Marital status:      Spouse name: Not on file    Number of children: Not on file    Years of education: Not on file    Highest education level: Not on file   Occupational History    Not on file   Social Needs    Financial resource strain: Not hard at all    Food insecurity     Worry: Never true     Inability: Never true    Transportation needs      Medical: No     Non-medical: No   Tobacco Use    Smoking status: Never Smoker    Smokeless tobacco: Never Used   Substance and Sexual Activity    Alcohol use: Never     Frequency: 2-4 times a month     Drinks per session: 1 or 2     Binge frequency: Less than monthly    Drug use: Never    Sexual activity: Yes     Partners: Female     Birth control/protection: None   Lifestyle    Physical activity     Days per week: 2 days     Minutes per session: 90 min    Stress: Not at all   Relationships    Social connections     Talks on phone: Twice a week     Gets together: Once a week     Attends Yarsani service: Not on file     Active member of club or organization: No     Attends meetings of clubs or organizations: Not on file     Relationship status:    Other Topics Concern    Not on file   Social History Narrative    Not on file       Review of Systems:   Review of Systems   HENT: Negative for hearing loss.    Eyes: Negative for discharge.   Respiratory: Negative for wheezing.    Cardiovascular: Negative for chest pain and palpitations.   Gastrointestinal: Negative for blood in stool, constipation, diarrhea and vomiting.   Genitourinary: Negative for hematuria and urgency.   Musculoskeletal: Negative for neck pain.   Neurological: Negative for weakness and headaches.   Endo/Heme/Allergies: Negative for polydipsia.   Psychiatric/Behavioral: Negative for depression.        Medications:  Outpatient Encounter Medications as of 9/10/2020   Medication Sig Dispense Refill    buPROPion (WELLBUTRIN XL) 300 MG 24 hr tablet Take 1 tablet (300 mg total) by mouth once daily. 90 tablet 2    cetirizine (ZYRTEC) 10 MG tablet Take 10 mg by mouth once daily.      esomeprazole (NEXIUM) 40 MG capsule Take 1 capsule (40 mg total) by mouth once daily. 90 capsule 0    fenofibrate 160 MG Tab Take 1 tablet (160 mg total) by mouth once daily. 90 tablet 2    irbesartan (AVAPRO) 300 MG tablet Take 1 tablet (300 mg total) by  "mouth once daily. 90 tablet 2    meloxicam (MOBIC) 15 MG tablet TAKE 1 TABLET(15 MG) BY MOUTH EVERY DAY 30 tablet 0    omega 3-dha-epa-fish oil (FISH OIL) 1,000 mg (120 mg-180 mg) Cap Take 1,000 mg by mouth once daily.      fluticasone propionate (FLONASE) 50 mcg/actuation nasal spray 1 spray (50 mcg total) by Each Nostril route once daily. 9.9 mL 0     No facility-administered encounter medications on file as of 9/10/2020.        Allergies:  Review of patient's allergies indicates:  No Known Allergies      Physical Exam      Vitals:    09/10/20 1432   BP: 122/84   Pulse: 78   Resp: 18   Temp: 97.8 °F (36.6 °C)        Vital Signs  Temp: 97.8 °F (36.6 °C)  Temp src: Oral  Pulse: 78  Resp: 18  SpO2: 99 %  BP: 122/84  BP Location: Right arm  Patient Position: Sitting  Pain Score: 0-No pain  Height and Weight  Height: 5' 11" (180.3 cm)  Weight: 111.5 kg (245 lb 13 oz)  BSA (Calculated - sq m): 2.36 sq meters  BMI (Calculated): 34.3  Weight in (lb) to have BMI = 25: 178.9]     Body mass index is 34.28 kg/m².    Physical Exam  Constitutional:       Appearance: He is well-developed.   HENT:      Head: Normocephalic.   Eyes:      Pupils: Pupils are equal, round, and reactive to light.   Neck:      Musculoskeletal: Normal range of motion.      Thyroid: No thyromegaly.   Cardiovascular:      Rate and Rhythm: Normal rate and regular rhythm.      Heart sounds: No murmur. No friction rub. No gallop.    Pulmonary:      Effort: Pulmonary effort is normal.      Breath sounds: Normal breath sounds.   Abdominal:      General: Bowel sounds are normal.      Palpations: Abdomen is soft.   Musculoskeletal: Normal range of motion.   Skin:     General: Skin is warm and dry.   Neurological:      Mental Status: He is alert and oriented to person, place, and time.      Sensory: No sensory deficit.   Psychiatric:         Behavior: Behavior normal.          Laboratory:  CBC:  No results for input(s): WBC, RBC, HGB, HCT, PLT, MCV, MCH, MCHC " in the last 2160 hours.  CMP:  No results for input(s): GLU, CALCIUM, ALBUMIN, PROT, NA, K, CO2, CL, BUN, ALKPHOS, ALT, AST, BILITOT in the last 2160 hours.    Invalid input(s): CREATININ  URINALYSIS:  No results for input(s): COLORU, CLARITYU, SPECGRAV, PHUR, PROTEINUA, GLUCOSEU, BILIRUBINCON, BLOODU, WBCU, RBCU, BACTERIA, MUCUS, NITRITE, LEUKOCYTESUR, UROBILINOGEN, HYALINECASTS in the last 2160 hours.   LIPIDS:  No results for input(s): TSH, HDL, CHOL, TRIG, LDLCALC, CHOLHDL, NONHDLCHOL, TOTALCHOLEST in the last 2160 hours.  TSH:  No results for input(s): TSH in the last 2160 hours.  A1C:  No results for input(s): HGBA1C in the last 2160 hours.    Radiology:        Assessment:     Rl Grubbs is a 44 y.o.male with:    Annual physical exam  -     CBC auto differential; Future; Expected date: 09/10/2020  -     Comprehensive metabolic panel; Future; Expected date: 09/10/2020  -     Lipid Panel; Future; Expected date: 09/10/2020    Prostate cancer screening  -     PSA, Screening; Future; Expected date: 09/10/2020    Encounter for hepatitis C screening test for low risk patient  -     Hepatitis C Antibody; Future; Expected date: 09/10/2020    Encounter for screening for HIV  -     HIV 1/2 Ag/Ab (4th Gen); Future; Expected date: 09/10/2020                Plan:     Problem List Items Addressed This Visit        Renal/    Prostate cancer screening    Overview     c heck psa             ID    Encounter for hepatitis C screening test for low risk patient    Overview     Hep c screen          Encounter for screening for HIV    Overview     Hiv screen             Other    Annual physical exam - Primary    Overview     pe documented needs full screening labs                As above, continue current medications and maintain follow up with specialists.  Return to clinic in 6  months.      Frederick W Dantagnan [facility] [place]                  Answers for HPI/ROS submitted by the patient on  9/8/2020   activity change: No  unexpected weight change: No  rhinorrhea: No  trouble swallowing: No  visual disturbance: No  chest tightness: No  polyuria: No  difficulty urinating: No  joint swelling: No  arthralgias: No  confusion: No  dysphoric mood: No

## 2020-09-19 LAB
ALBUMIN SERPL-MCNC: 4.6 G/DL (ref 3.6–5.1)
ALBUMIN/GLOB SERPL: 1.6 (CALC) (ref 1–2.5)
ALP SERPL-CCNC: 43 U/L (ref 36–130)
ALT SERPL-CCNC: 21 U/L (ref 9–46)
AST SERPL-CCNC: 18 U/L (ref 10–40)
BASOPHILS # BLD AUTO: 62 CELLS/UL (ref 0–200)
BASOPHILS NFR BLD AUTO: 0.9 %
BILIRUB SERPL-MCNC: 0.5 MG/DL (ref 0.2–1.2)
BUN SERPL-MCNC: 16 MG/DL (ref 7–25)
BUN/CREAT SERPL: ABNORMAL (CALC) (ref 6–22)
CALCIUM SERPL-MCNC: 9.9 MG/DL (ref 8.6–10.3)
CHLORIDE SERPL-SCNC: 102 MMOL/L (ref 98–110)
CHOLEST SERPL-MCNC: 164 MG/DL
CHOLEST/HDLC SERPL: 3.6 (CALC)
CO2 SERPL-SCNC: 28 MMOL/L (ref 20–32)
CREAT SERPL-MCNC: 1.17 MG/DL (ref 0.6–1.35)
EOSINOPHIL # BLD AUTO: 317 CELLS/UL (ref 15–500)
EOSINOPHIL NFR BLD AUTO: 4.6 %
ERYTHROCYTE [DISTWIDTH] IN BLOOD BY AUTOMATED COUNT: 13.1 % (ref 11–15)
GFRSERPLBLD MDRD-ARVRAT: 74 ML/MIN/1.73M2
GLOBULIN SER CALC-MCNC: 2.9 G/DL (CALC) (ref 1.9–3.7)
GLUCOSE SERPL-MCNC: 120 MG/DL (ref 65–99)
HCT VFR BLD AUTO: 44.6 % (ref 38.5–50)
HCV AB S/CO SERPL IA: 0.02
HCV AB SERPL QL IA: NORMAL
HDLC SERPL-MCNC: 45 MG/DL
HGB BLD-MCNC: 15 G/DL (ref 13.2–17.1)
HIV 1+2 AB+HIV1 P24 AG SERPL QL IA: NORMAL
LDLC SERPL CALC-MCNC: 94 MG/DL (CALC)
LYMPHOCYTES # BLD AUTO: 2015 CELLS/UL (ref 850–3900)
LYMPHOCYTES NFR BLD AUTO: 29.2 %
MCH RBC QN AUTO: 28 PG (ref 27–33)
MCHC RBC AUTO-ENTMCNC: 33.6 G/DL (ref 32–36)
MCV RBC AUTO: 83.4 FL (ref 80–100)
MONOCYTES # BLD AUTO: 449 CELLS/UL (ref 200–950)
MONOCYTES NFR BLD AUTO: 6.5 %
NEUTROPHILS # BLD AUTO: 4057 CELLS/UL (ref 1500–7800)
NEUTROPHILS NFR BLD AUTO: 58.8 %
NONHDLC SERPL-MCNC: 119 MG/DL (CALC)
PLATELET # BLD AUTO: 276 THOUSAND/UL (ref 140–400)
PMV BLD REES-ECKER: 9.4 FL (ref 7.5–12.5)
POTASSIUM SERPL-SCNC: 5 MMOL/L (ref 3.5–5.3)
PROT SERPL-MCNC: 7.5 G/DL (ref 6.1–8.1)
PSA SERPL-MCNC: 2.6 NG/ML
RBC # BLD AUTO: 5.35 MILLION/UL (ref 4.2–5.8)
SODIUM SERPL-SCNC: 139 MMOL/L (ref 135–146)
TRIGL SERPL-MCNC: 153 MG/DL
WBC # BLD AUTO: 6.9 THOUSAND/UL (ref 3.8–10.8)

## 2020-09-21 ENCOUNTER — TELEPHONE (OUTPATIENT)
Dept: FAMILY MEDICINE | Facility: CLINIC | Age: 45
End: 2020-09-21

## 2020-09-21 ENCOUNTER — PATIENT MESSAGE (OUTPATIENT)
Dept: FAMILY MEDICINE | Facility: CLINIC | Age: 45
End: 2020-09-21

## 2020-09-21 NOTE — TELEPHONE ENCOUNTER
----- Message from Blanca Harrison sent at 9/21/2020  8:05 AM CDT -----  Regarding: pt call back  Contact: pt  Pt was retuning call from Friday please advise    Pt can be reached at [phone]

## 2020-12-14 PROBLEM — Z00.00 ANNUAL PHYSICAL EXAM: Status: RESOLVED | Noted: 2020-09-10 | Resolved: 2020-12-14

## 2021-01-19 RX ORDER — BUPROPION HYDROCHLORIDE 300 MG/1
TABLET ORAL
Qty: 90 TABLET | Refills: 3 | Status: SHIPPED | OUTPATIENT
Start: 2021-01-19 | End: 2022-01-16 | Stop reason: SDUPTHER

## 2021-01-19 RX ORDER — FENOFIBRATE 160 MG/1
TABLET ORAL
Qty: 90 TABLET | Refills: 3 | Status: SHIPPED | OUTPATIENT
Start: 2021-01-19 | End: 2022-01-16 | Stop reason: SDUPTHER

## 2021-03-11 ENCOUNTER — OFFICE VISIT (OUTPATIENT)
Dept: FAMILY MEDICINE | Facility: CLINIC | Age: 46
End: 2021-03-11
Payer: COMMERCIAL

## 2021-03-11 VITALS
WEIGHT: 247.44 LBS | OXYGEN SATURATION: 98 % | BODY MASS INDEX: 34.64 KG/M2 | RESPIRATION RATE: 18 BRPM | SYSTOLIC BLOOD PRESSURE: 124 MMHG | TEMPERATURE: 98 F | HEART RATE: 90 BPM | HEIGHT: 71 IN | DIASTOLIC BLOOD PRESSURE: 82 MMHG

## 2021-03-11 DIAGNOSIS — E78.2 MIXED HYPERLIPIDEMIA: ICD-10-CM

## 2021-03-11 DIAGNOSIS — I10 BENIGN ESSENTIAL HYPERTENSION: Primary | ICD-10-CM

## 2021-03-11 DIAGNOSIS — G47.33 OSA (OBSTRUCTIVE SLEEP APNEA): ICD-10-CM

## 2021-03-11 DIAGNOSIS — F33.0 DEPRESSION, MAJOR, RECURRENT, MILD: ICD-10-CM

## 2021-03-11 DIAGNOSIS — K21.9 GASTROESOPHAGEAL REFLUX DISEASE WITHOUT ESOPHAGITIS: ICD-10-CM

## 2021-03-11 PROCEDURE — 3074F PR MOST RECENT SYSTOLIC BLOOD PRESSURE < 130 MM HG: ICD-10-PCS | Mod: CPTII,S$GLB,, | Performed by: INTERNAL MEDICINE

## 2021-03-11 PROCEDURE — 3079F DIAST BP 80-89 MM HG: CPT | Mod: CPTII,S$GLB,, | Performed by: INTERNAL MEDICINE

## 2021-03-11 PROCEDURE — 1126F AMNT PAIN NOTED NONE PRSNT: CPT | Mod: S$GLB,,, | Performed by: INTERNAL MEDICINE

## 2021-03-11 PROCEDURE — 3008F BODY MASS INDEX DOCD: CPT | Mod: CPTII,S$GLB,, | Performed by: INTERNAL MEDICINE

## 2021-03-11 PROCEDURE — 3008F PR BODY MASS INDEX (BMI) DOCUMENTED: ICD-10-PCS | Mod: CPTII,S$GLB,, | Performed by: INTERNAL MEDICINE

## 2021-03-11 PROCEDURE — 99214 OFFICE O/P EST MOD 30 MIN: CPT | Mod: S$GLB,,, | Performed by: INTERNAL MEDICINE

## 2021-03-11 PROCEDURE — 99999 PR PBB SHADOW E&M-EST. PATIENT-LVL IV: ICD-10-PCS | Mod: PBBFAC,,, | Performed by: INTERNAL MEDICINE

## 2021-03-11 PROCEDURE — 99214 PR OFFICE/OUTPT VISIT, EST, LEVL IV, 30-39 MIN: ICD-10-PCS | Mod: S$GLB,,, | Performed by: INTERNAL MEDICINE

## 2021-03-11 PROCEDURE — 1126F PR PAIN SEVERITY QUANTIFIED, NO PAIN PRESENT: ICD-10-PCS | Mod: S$GLB,,, | Performed by: INTERNAL MEDICINE

## 2021-03-11 PROCEDURE — 3079F PR MOST RECENT DIASTOLIC BLOOD PRESSURE 80-89 MM HG: ICD-10-PCS | Mod: CPTII,S$GLB,, | Performed by: INTERNAL MEDICINE

## 2021-03-11 PROCEDURE — 3074F SYST BP LT 130 MM HG: CPT | Mod: CPTII,S$GLB,, | Performed by: INTERNAL MEDICINE

## 2021-03-11 PROCEDURE — 99999 PR PBB SHADOW E&M-EST. PATIENT-LVL IV: CPT | Mod: PBBFAC,,, | Performed by: INTERNAL MEDICINE

## 2021-04-12 ENCOUNTER — PATIENT MESSAGE (OUTPATIENT)
Dept: FAMILY MEDICINE | Facility: CLINIC | Age: 46
End: 2021-04-12

## 2021-04-13 ENCOUNTER — PATIENT MESSAGE (OUTPATIENT)
Dept: FAMILY MEDICINE | Facility: CLINIC | Age: 46
End: 2021-04-13

## 2021-05-24 ENCOUNTER — PATIENT MESSAGE (OUTPATIENT)
Dept: DERMATOLOGY | Facility: CLINIC | Age: 46
End: 2021-05-24

## 2021-06-14 ENCOUNTER — HOSPITAL ENCOUNTER (EMERGENCY)
Facility: HOSPITAL | Age: 46
Discharge: HOME | End: 2021-06-14
Attending: EMERGENCY MEDICINE
Payer: COMMERCIAL

## 2021-06-14 VITALS
OXYGEN SATURATION: 99 % | HEIGHT: 71 IN | DIASTOLIC BLOOD PRESSURE: 74 MMHG | WEIGHT: 240 LBS | HEART RATE: 64 BPM | RESPIRATION RATE: 18 BRPM | BODY MASS INDEX: 33.6 KG/M2 | TEMPERATURE: 98 F | SYSTOLIC BLOOD PRESSURE: 144 MMHG

## 2021-06-14 DIAGNOSIS — R10.13 EPIGASTRIC PAIN: ICD-10-CM

## 2021-06-14 DIAGNOSIS — K80.50 BILIARY COLIC: Primary | ICD-10-CM

## 2021-06-14 PROBLEM — K25.7 CHRONIC GASTRIC ULCER: Status: ACTIVE | Noted: 2021-06-14

## 2021-06-14 PROBLEM — E78.1 PURE HYPERTRIGLYCERIDEMIA: Status: ACTIVE | Noted: 2021-06-14

## 2021-06-14 LAB
ALBUMIN SERPL BCP-MCNC: 3.7 G/DL (ref 3.5–5.2)
ALP SERPL-CCNC: 61 U/L (ref 55–135)
ALT SERPL W/O P-5'-P-CCNC: 16 U/L (ref 10–44)
ANION GAP SERPL CALC-SCNC: 11 MMOL/L (ref 8–16)
AST SERPL-CCNC: 15 U/L (ref 10–40)
BASOPHILS # BLD AUTO: 0.06 K/UL (ref 0–0.2)
BASOPHILS NFR BLD: 0.9 % (ref 0–1.9)
BILIRUB SERPL-MCNC: 0.2 MG/DL (ref 0.1–1)
BILIRUB UR QL STRIP: NEGATIVE
BUN SERPL-MCNC: 14 MG/DL (ref 6–20)
CALCIUM SERPL-MCNC: 9.1 MG/DL (ref 8.7–10.5)
CHLORIDE SERPL-SCNC: 105 MMOL/L (ref 95–110)
CLARITY UR REFRACT.AUTO: CLEAR
CO2 SERPL-SCNC: 21 MMOL/L (ref 23–29)
COLOR UR AUTO: YELLOW
CREAT SERPL-MCNC: 1.2 MG/DL (ref 0.5–1.4)
DIFFERENTIAL METHOD: ABNORMAL
EOSINOPHIL # BLD AUTO: 0.4 K/UL (ref 0–0.5)
EOSINOPHIL NFR BLD: 5.9 % (ref 0–8)
ERYTHROCYTE [DISTWIDTH] IN BLOOD BY AUTOMATED COUNT: 13.3 % (ref 11.5–14.5)
EST. GFR  (AFRICAN AMERICAN): >60 ML/MIN/1.73 M^2
EST. GFR  (NON AFRICAN AMERICAN): >60 ML/MIN/1.73 M^2
GLUCOSE SERPL-MCNC: 138 MG/DL (ref 70–110)
GLUCOSE UR QL STRIP: NEGATIVE
HCT VFR BLD AUTO: 41.2 % (ref 40–54)
HCV AB SERPL QL IA: NEGATIVE
HGB BLD-MCNC: 13.9 G/DL (ref 14–18)
HGB UR QL STRIP: NEGATIVE
HIV 1+2 AB+HIV1 P24 AG SERPL QL IA: NEGATIVE
IMM GRANULOCYTES # BLD AUTO: 0.02 K/UL (ref 0–0.04)
IMM GRANULOCYTES NFR BLD AUTO: 0.3 % (ref 0–0.5)
KETONES UR QL STRIP: NEGATIVE
LEUKOCYTE ESTERASE UR QL STRIP: NEGATIVE
LIPASE SERPL-CCNC: 39 U/L (ref 4–60)
LYMPHOCYTES # BLD AUTO: 2.5 K/UL (ref 1–4.8)
LYMPHOCYTES NFR BLD: 38.7 % (ref 18–48)
MCH RBC QN AUTO: 27.6 PG (ref 27–31)
MCHC RBC AUTO-ENTMCNC: 33.7 G/DL (ref 32–36)
MCV RBC AUTO: 82 FL (ref 82–98)
MONOCYTES # BLD AUTO: 0.4 K/UL (ref 0.3–1)
MONOCYTES NFR BLD: 6.7 % (ref 4–15)
NEUTROPHILS # BLD AUTO: 3.1 K/UL (ref 1.8–7.7)
NEUTROPHILS NFR BLD: 47.5 % (ref 38–73)
NITRITE UR QL STRIP: NEGATIVE
NRBC BLD-RTO: 0 /100 WBC
PH UR STRIP: 5 [PH] (ref 5–8)
PLATELET # BLD AUTO: 230 K/UL (ref 150–450)
PMV BLD AUTO: 9.2 FL (ref 9.2–12.9)
POTASSIUM SERPL-SCNC: 3.9 MMOL/L (ref 3.5–5.1)
PROT SERPL-MCNC: 7.1 G/DL (ref 6–8.4)
PROT UR QL STRIP: NEGATIVE
RBC # BLD AUTO: 5.03 M/UL (ref 4.6–6.2)
SODIUM SERPL-SCNC: 137 MMOL/L (ref 136–145)
SP GR UR STRIP: 1.02 (ref 1–1.03)
URN SPEC COLLECT METH UR: NORMAL
WBC # BLD AUTO: 6.43 K/UL (ref 3.9–12.7)

## 2021-06-14 PROCEDURE — 63600175 PHARM REV CODE 636 W HCPCS: Performed by: PHYSICIAN ASSISTANT

## 2021-06-14 PROCEDURE — 81003 URINALYSIS AUTO W/O SCOPE: CPT | Performed by: PHYSICIAN ASSISTANT

## 2021-06-14 PROCEDURE — 25000003 PHARM REV CODE 250: Performed by: PHYSICIAN ASSISTANT

## 2021-06-14 PROCEDURE — 96376 TX/PRO/DX INJ SAME DRUG ADON: CPT

## 2021-06-14 PROCEDURE — 99285 EMERGENCY DEPT VISIT HI MDM: CPT | Mod: ,,, | Performed by: PHYSICIAN ASSISTANT

## 2021-06-14 PROCEDURE — 99285 EMERGENCY DEPT VISIT HI MDM: CPT | Mod: 25

## 2021-06-14 PROCEDURE — 99285 PR EMERGENCY DEPT VISIT,LEVEL V: ICD-10-PCS | Mod: ,,, | Performed by: PHYSICIAN ASSISTANT

## 2021-06-14 PROCEDURE — 85025 COMPLETE CBC W/AUTO DIFF WBC: CPT | Performed by: PHYSICIAN ASSISTANT

## 2021-06-14 PROCEDURE — 86803 HEPATITIS C AB TEST: CPT | Performed by: EMERGENCY MEDICINE

## 2021-06-14 PROCEDURE — 80053 COMPREHEN METABOLIC PANEL: CPT | Performed by: PHYSICIAN ASSISTANT

## 2021-06-14 PROCEDURE — 93010 EKG 12-LEAD: ICD-10-PCS | Mod: ,,, | Performed by: INTERNAL MEDICINE

## 2021-06-14 PROCEDURE — 96374 THER/PROPH/DIAG INJ IV PUSH: CPT

## 2021-06-14 PROCEDURE — 86703 HIV-1/HIV-2 1 RESULT ANTBDY: CPT | Performed by: EMERGENCY MEDICINE

## 2021-06-14 PROCEDURE — 63600175 PHARM REV CODE 636 W HCPCS: Performed by: EMERGENCY MEDICINE

## 2021-06-14 PROCEDURE — 93005 ELECTROCARDIOGRAM TRACING: CPT

## 2021-06-14 PROCEDURE — 83690 ASSAY OF LIPASE: CPT | Performed by: PHYSICIAN ASSISTANT

## 2021-06-14 PROCEDURE — 96375 TX/PRO/DX INJ NEW DRUG ADDON: CPT

## 2021-06-14 PROCEDURE — 93010 ELECTROCARDIOGRAM REPORT: CPT | Mod: ,,, | Performed by: INTERNAL MEDICINE

## 2021-06-14 RX ORDER — MORPHINE SULFATE 4 MG/ML
4 INJECTION, SOLUTION INTRAMUSCULAR; INTRAVENOUS
Status: COMPLETED | OUTPATIENT
Start: 2021-06-14 | End: 2021-06-14

## 2021-06-14 RX ORDER — MAG HYDROX/ALUMINUM HYD/SIMETH 200-200-20
30 SUSPENSION, ORAL (FINAL DOSE FORM) ORAL
Status: COMPLETED | OUTPATIENT
Start: 2021-06-14 | End: 2021-06-14

## 2021-06-14 RX ORDER — LIDOCAINE HYDROCHLORIDE 20 MG/ML
5 SOLUTION OROPHARYNGEAL
Status: COMPLETED | OUTPATIENT
Start: 2021-06-14 | End: 2021-06-14

## 2021-06-14 RX ORDER — ONDANSETRON 2 MG/ML
8 INJECTION INTRAMUSCULAR; INTRAVENOUS
Status: COMPLETED | OUTPATIENT
Start: 2021-06-14 | End: 2021-06-14

## 2021-06-14 RX ORDER — KETOROLAC TROMETHAMINE 10 MG/1
10 TABLET, FILM COATED ORAL EVERY 6 HOURS PRN
Qty: 20 TABLET | Refills: 0 | Status: SHIPPED | OUTPATIENT
Start: 2021-06-14 | End: 2021-06-19

## 2021-06-14 RX ORDER — ACETAMINOPHEN 500 MG
1000 TABLET ORAL
Status: COMPLETED | OUTPATIENT
Start: 2021-06-14 | End: 2021-06-14

## 2021-06-14 RX ORDER — KETOROLAC TROMETHAMINE 30 MG/ML
10 INJECTION, SOLUTION INTRAMUSCULAR; INTRAVENOUS
Status: DISCONTINUED | OUTPATIENT
Start: 2021-06-14 | End: 2021-06-14

## 2021-06-14 RX ORDER — HYDROCODONE BITARTRATE AND ACETAMINOPHEN 5; 325 MG/1; MG/1
1 TABLET ORAL EVERY 4 HOURS PRN
Qty: 12 TABLET | Refills: 0 | Status: ON HOLD | OUTPATIENT
Start: 2021-06-14 | End: 2021-07-28 | Stop reason: HOSPADM

## 2021-06-14 RX ORDER — KETOROLAC TROMETHAMINE 30 MG/ML
10 INJECTION, SOLUTION INTRAMUSCULAR; INTRAVENOUS
Status: COMPLETED | OUTPATIENT
Start: 2021-06-14 | End: 2021-06-14

## 2021-06-14 RX ORDER — ONDANSETRON 4 MG/1
4 TABLET, ORALLY DISINTEGRATING ORAL EVERY 6 HOURS PRN
Qty: 15 TABLET | Refills: 0 | Status: SHIPPED | OUTPATIENT
Start: 2021-06-14 | End: 2021-09-16

## 2021-06-14 RX ORDER — DOCUSATE SODIUM 100 MG/1
100 CAPSULE, LIQUID FILLED ORAL 2 TIMES DAILY
Qty: 60 CAPSULE | Refills: 0 | Status: SHIPPED | OUTPATIENT
Start: 2021-06-14 | End: 2022-06-14

## 2021-06-14 RX ADMIN — ONDANSETRON 8 MG: 2 INJECTION INTRAMUSCULAR; INTRAVENOUS at 06:06

## 2021-06-14 RX ADMIN — ALUMINUM HYDROXIDE, MAGNESIUM HYDROXIDE, AND SIMETHICONE 30 ML: 200; 200; 20 SUSPENSION ORAL at 03:06

## 2021-06-14 RX ADMIN — KETOROLAC TROMETHAMINE 10 MG: 30 INJECTION, SOLUTION INTRAMUSCULAR; INTRAVENOUS at 06:06

## 2021-06-14 RX ADMIN — ACETAMINOPHEN 1000 MG: 500 TABLET ORAL at 04:06

## 2021-06-14 RX ADMIN — ONDANSETRON 8 MG: 2 INJECTION INTRAMUSCULAR; INTRAVENOUS at 03:06

## 2021-06-14 RX ADMIN — LIDOCAINE HYDROCHLORIDE 5 ML: 20 SOLUTION ORAL at 03:06

## 2021-06-14 RX ADMIN — MORPHINE SULFATE 4 MG: 4 INJECTION INTRAVENOUS at 06:06

## 2021-06-15 ENCOUNTER — TELEPHONE (OUTPATIENT)
Dept: EMERGENCY MEDICINE | Facility: HOSPITAL | Age: 46
End: 2021-06-15

## 2021-06-16 ENCOUNTER — PATIENT MESSAGE (OUTPATIENT)
Dept: DERMATOLOGY | Facility: CLINIC | Age: 46
End: 2021-06-16

## 2021-06-17 ENCOUNTER — OFFICE VISIT (OUTPATIENT)
Dept: DERMATOLOGY | Facility: CLINIC | Age: 46
End: 2021-06-17
Payer: COMMERCIAL

## 2021-06-17 DIAGNOSIS — D18.01 ANGIOMA OF SKIN: ICD-10-CM

## 2021-06-17 DIAGNOSIS — L81.4 LENTIGINES: ICD-10-CM

## 2021-06-17 DIAGNOSIS — D22.5 MELANOCYTIC NEVI OF TRUNK: ICD-10-CM

## 2021-06-17 DIAGNOSIS — L82.1 SEBORRHEIC KERATOSIS: Primary | ICD-10-CM

## 2021-06-17 PROCEDURE — 1126F PR PAIN SEVERITY QUANTIFIED, NO PAIN PRESENT: ICD-10-PCS | Mod: S$GLB,,, | Performed by: DERMATOLOGY

## 2021-06-17 PROCEDURE — 99203 OFFICE O/P NEW LOW 30 MIN: CPT | Mod: S$GLB,,, | Performed by: DERMATOLOGY

## 2021-06-17 PROCEDURE — 1126F AMNT PAIN NOTED NONE PRSNT: CPT | Mod: S$GLB,,, | Performed by: DERMATOLOGY

## 2021-06-17 PROCEDURE — 99203 PR OFFICE/OUTPT VISIT, NEW, LEVL III, 30-44 MIN: ICD-10-PCS | Mod: S$GLB,,, | Performed by: DERMATOLOGY

## 2021-06-18 ENCOUNTER — PATIENT OUTREACH (OUTPATIENT)
Dept: ADMINISTRATIVE | Facility: OTHER | Age: 46
End: 2021-06-18

## 2021-06-22 ENCOUNTER — OFFICE VISIT (OUTPATIENT)
Dept: SURGERY | Facility: CLINIC | Age: 46
End: 2021-06-22
Payer: COMMERCIAL

## 2021-06-22 VITALS
BODY MASS INDEX: 34.17 KG/M2 | SYSTOLIC BLOOD PRESSURE: 139 MMHG | HEART RATE: 108 BPM | WEIGHT: 244.06 LBS | OXYGEN SATURATION: 98 % | HEIGHT: 71 IN | DIASTOLIC BLOOD PRESSURE: 83 MMHG

## 2021-06-22 DIAGNOSIS — K80.50 BILIARY COLIC: ICD-10-CM

## 2021-06-22 DIAGNOSIS — K43.9 EPIGASTRIC HERNIA: Primary | ICD-10-CM

## 2021-06-22 PROCEDURE — 99214 PR OFFICE/OUTPT VISIT, EST, LEVL IV, 30-39 MIN: ICD-10-PCS | Mod: S$GLB,,, | Performed by: SURGERY

## 2021-06-22 PROCEDURE — 99214 OFFICE O/P EST MOD 30 MIN: CPT | Mod: S$GLB,,, | Performed by: SURGERY

## 2021-06-22 PROCEDURE — 99999 PR PBB SHADOW E&M-EST. PATIENT-LVL V: ICD-10-PCS | Mod: PBBFAC,,, | Performed by: SURGERY

## 2021-06-22 PROCEDURE — 99999 PR PBB SHADOW E&M-EST. PATIENT-LVL V: CPT | Mod: PBBFAC,,, | Performed by: SURGERY

## 2021-07-01 ENCOUNTER — PATIENT MESSAGE (OUTPATIENT)
Dept: SURGERY | Facility: HOSPITAL | Age: 46
End: 2021-07-01

## 2021-07-26 ENCOUNTER — PATIENT MESSAGE (OUTPATIENT)
Dept: SURGERY | Facility: HOSPITAL | Age: 46
End: 2021-07-26

## 2021-07-27 ENCOUNTER — TELEPHONE (OUTPATIENT)
Dept: SURGERY | Facility: CLINIC | Age: 46
End: 2021-07-27

## 2021-07-27 ENCOUNTER — ANESTHESIA EVENT (OUTPATIENT)
Dept: SURGERY | Facility: HOSPITAL | Age: 46
End: 2021-07-27
Payer: COMMERCIAL

## 2021-07-28 ENCOUNTER — HOSPITAL ENCOUNTER (OUTPATIENT)
Facility: HOSPITAL | Age: 46
Discharge: HOME | End: 2021-07-28
Attending: SURGERY | Admitting: SURGERY
Payer: COMMERCIAL

## 2021-07-28 ENCOUNTER — ANESTHESIA (OUTPATIENT)
Dept: SURGERY | Facility: HOSPITAL | Age: 46
End: 2021-07-28
Payer: COMMERCIAL

## 2021-07-28 VITALS
WEIGHT: 244 LBS | SYSTOLIC BLOOD PRESSURE: 123 MMHG | OXYGEN SATURATION: 95 % | HEIGHT: 71 IN | TEMPERATURE: 98 F | RESPIRATION RATE: 15 BRPM | DIASTOLIC BLOOD PRESSURE: 62 MMHG | HEART RATE: 71 BPM | BODY MASS INDEX: 34.16 KG/M2

## 2021-07-28 DIAGNOSIS — E78.1 PURE HYPERTRIGLYCERIDEMIA: ICD-10-CM

## 2021-07-28 DIAGNOSIS — K80.50 BILIARY COLIC: Primary | ICD-10-CM

## 2021-07-28 PROCEDURE — 25000003 PHARM REV CODE 250: Performed by: SURGERY

## 2021-07-28 PROCEDURE — 71000044 HC DOSC ROUTINE RECOVERY FIRST HOUR: Performed by: SURGERY

## 2021-07-28 PROCEDURE — 63600175 PHARM REV CODE 636 W HCPCS: Performed by: STUDENT IN AN ORGANIZED HEALTH CARE EDUCATION/TRAINING PROGRAM

## 2021-07-28 PROCEDURE — 36000711: Performed by: SURGERY

## 2021-07-28 PROCEDURE — C1781 MESH (IMPLANTABLE): HCPCS | Performed by: SURGERY

## 2021-07-28 PROCEDURE — 88304 TISSUE EXAM BY PATHOLOGIST: CPT | Mod: 26,,, | Performed by: PATHOLOGY

## 2021-07-28 PROCEDURE — 37000009 HC ANESTHESIA EA ADD 15 MINS: Performed by: SURGERY

## 2021-07-28 PROCEDURE — 27201423 OPTIME MED/SURG SUP & DEVICES STERILE SUPPLY: Performed by: SURGERY

## 2021-07-28 PROCEDURE — D9220A PRA ANESTHESIA: Mod: ,,, | Performed by: SURGERY

## 2021-07-28 PROCEDURE — 25000003 PHARM REV CODE 250: Performed by: STUDENT IN AN ORGANIZED HEALTH CARE EDUCATION/TRAINING PROGRAM

## 2021-07-28 PROCEDURE — 49653 PR LAP VENTRAL/UMBILICAL HERNIA; INCARCERATED OR STRANGULATED: CPT | Mod: ,,, | Performed by: SURGERY

## 2021-07-28 PROCEDURE — 88304 TISSUE EXAM BY PATHOLOGIST: CPT | Performed by: PATHOLOGY

## 2021-07-28 PROCEDURE — 47562 PR LAP,CHOLECYSTECTOMY: ICD-10-PCS | Mod: 51,,, | Performed by: SURGERY

## 2021-07-28 PROCEDURE — 88304 PR  SURG PATH,LEVEL III: ICD-10-PCS | Mod: 26,,, | Performed by: PATHOLOGY

## 2021-07-28 PROCEDURE — 71000015 HC POSTOP RECOV 1ST HR: Performed by: SURGERY

## 2021-07-28 PROCEDURE — D9220A PRA ANESTHESIA: ICD-10-PCS | Mod: ,,, | Performed by: SURGERY

## 2021-07-28 PROCEDURE — 36000710: Performed by: SURGERY

## 2021-07-28 PROCEDURE — 47562 LAPAROSCOPIC CHOLECYSTECTOMY: CPT | Mod: 51,,, | Performed by: SURGERY

## 2021-07-28 PROCEDURE — 71000016 HC POSTOP RECOV ADDL HR: Performed by: SURGERY

## 2021-07-28 PROCEDURE — 37000008 HC ANESTHESIA 1ST 15 MINUTES: Performed by: SURGERY

## 2021-07-28 PROCEDURE — 49653 PR LAP VENTRAL/UMBILICAL HERNIA; INCARCERATED OR STRANGULATED: ICD-10-PCS | Mod: ,,, | Performed by: SURGERY

## 2021-07-28 DEVICE — MESH PHASIX ST RECT 7X10CM: Type: IMPLANTABLE DEVICE | Site: ABDOMEN | Status: FUNCTIONAL

## 2021-07-28 RX ORDER — BUPIVACAINE HYDROCHLORIDE 5 MG/ML
INJECTION, SOLUTION EPIDURAL; INTRACAUDAL
Status: DISCONTINUED | OUTPATIENT
Start: 2021-07-28 | End: 2021-07-28 | Stop reason: HOSPADM

## 2021-07-28 RX ORDER — CEFAZOLIN SODIUM 1 G/3ML
2 INJECTION, POWDER, FOR SOLUTION INTRAMUSCULAR; INTRAVENOUS
Status: DISCONTINUED | OUTPATIENT
Start: 2021-07-28 | End: 2021-07-28 | Stop reason: HOSPADM

## 2021-07-28 RX ORDER — PROPOFOL 10 MG/ML
VIAL (ML) INTRAVENOUS
Status: DISCONTINUED | OUTPATIENT
Start: 2021-07-28 | End: 2021-07-28

## 2021-07-28 RX ORDER — FENTANYL CITRATE 50 UG/ML
INJECTION, SOLUTION INTRAMUSCULAR; INTRAVENOUS
Status: DISCONTINUED | OUTPATIENT
Start: 2021-07-28 | End: 2021-07-28

## 2021-07-28 RX ORDER — SODIUM CHLORIDE 9 MG/ML
INJECTION, SOLUTION INTRAVENOUS CONTINUOUS
Status: DISCONTINUED | OUTPATIENT
Start: 2021-07-28 | End: 2021-07-28 | Stop reason: HOSPADM

## 2021-07-28 RX ORDER — SODIUM CHLORIDE 0.9 % (FLUSH) 0.9 %
10 SYRINGE (ML) INJECTION
Status: DISCONTINUED | OUTPATIENT
Start: 2021-07-28 | End: 2021-07-28 | Stop reason: HOSPADM

## 2021-07-28 RX ORDER — LIDOCAINE HYDROCHLORIDE 20 MG/ML
INJECTION INTRAVENOUS
Status: DISCONTINUED | OUTPATIENT
Start: 2021-07-28 | End: 2021-07-28

## 2021-07-28 RX ORDER — MIDAZOLAM HYDROCHLORIDE 1 MG/ML
INJECTION, SOLUTION INTRAMUSCULAR; INTRAVENOUS
Status: DISCONTINUED | OUTPATIENT
Start: 2021-07-28 | End: 2021-07-28

## 2021-07-28 RX ORDER — OXYCODONE HYDROCHLORIDE 5 MG/1
10 TABLET ORAL ONCE
Status: COMPLETED | OUTPATIENT
Start: 2021-07-28 | End: 2021-07-28

## 2021-07-28 RX ORDER — FENTANYL CITRATE 50 UG/ML
25 INJECTION, SOLUTION INTRAMUSCULAR; INTRAVENOUS EVERY 5 MIN PRN
Status: DISCONTINUED | OUTPATIENT
Start: 2021-07-28 | End: 2021-07-28 | Stop reason: HOSPADM

## 2021-07-28 RX ORDER — ONDANSETRON 8 MG/1
8 TABLET, ORALLY DISINTEGRATING ORAL EVERY 8 HOURS PRN
Status: DISCONTINUED | OUTPATIENT
Start: 2021-07-28 | End: 2021-07-28 | Stop reason: HOSPADM

## 2021-07-28 RX ORDER — ONDANSETRON 2 MG/ML
INJECTION INTRAMUSCULAR; INTRAVENOUS
Status: DISCONTINUED | OUTPATIENT
Start: 2021-07-28 | End: 2021-07-28

## 2021-07-28 RX ORDER — ACETAMINOPHEN 500 MG
1000 TABLET ORAL
Status: COMPLETED | OUTPATIENT
Start: 2021-07-28 | End: 2021-07-28

## 2021-07-28 RX ORDER — DEXMEDETOMIDINE HYDROCHLORIDE 100 UG/ML
INJECTION, SOLUTION INTRAVENOUS
Status: DISCONTINUED | OUTPATIENT
Start: 2021-07-28 | End: 2021-07-28

## 2021-07-28 RX ORDER — ONDANSETRON 2 MG/ML
4 INJECTION INTRAMUSCULAR; INTRAVENOUS DAILY PRN
Status: DISCONTINUED | OUTPATIENT
Start: 2021-07-28 | End: 2021-07-28 | Stop reason: HOSPADM

## 2021-07-28 RX ORDER — OXYCODONE AND ACETAMINOPHEN 5; 325 MG/1; MG/1
1 TABLET ORAL EVERY 4 HOURS PRN
Qty: 30 TABLET | Refills: 0 | Status: SHIPPED | OUTPATIENT
Start: 2021-07-28 | End: 2021-09-16

## 2021-07-28 RX ORDER — ROCURONIUM BROMIDE 10 MG/ML
INJECTION, SOLUTION INTRAVENOUS
Status: DISCONTINUED | OUTPATIENT
Start: 2021-07-28 | End: 2021-07-28

## 2021-07-28 RX ORDER — NEOSTIGMINE METHYLSULFATE 0.5 MG/ML
INJECTION, SOLUTION INTRAVENOUS
Status: DISCONTINUED | OUTPATIENT
Start: 2021-07-28 | End: 2021-07-28

## 2021-07-28 RX ORDER — CEFAZOLIN SODIUM 1 G/3ML
INJECTION, POWDER, FOR SOLUTION INTRAMUSCULAR; INTRAVENOUS
Status: DISCONTINUED | OUTPATIENT
Start: 2021-07-28 | End: 2021-07-28

## 2021-07-28 RX ADMIN — MIDAZOLAM HYDROCHLORIDE 2 MG: 1 INJECTION, SOLUTION INTRAMUSCULAR; INTRAVENOUS at 11:07

## 2021-07-28 RX ADMIN — DEXMEDETOMIDINE HYDROCHLORIDE 8 MCG: 100 INJECTION, SOLUTION, CONCENTRATE INTRAVENOUS at 01:07

## 2021-07-28 RX ADMIN — LIDOCAINE HYDROCHLORIDE 100 MG: 20 INJECTION, SOLUTION INTRAVENOUS at 11:07

## 2021-07-28 RX ADMIN — FENTANYL CITRATE 50 MCG: 50 INJECTION, SOLUTION INTRAMUSCULAR; INTRAVENOUS at 12:07

## 2021-07-28 RX ADMIN — ROCURONIUM BROMIDE 20 MG: 10 INJECTION, SOLUTION INTRAVENOUS at 11:07

## 2021-07-28 RX ADMIN — GLYCOPYRROLATE 0.6 MG: 0.2 INJECTION, SOLUTION INTRAMUSCULAR; INTRAVITREAL at 01:07

## 2021-07-28 RX ADMIN — OXYCODONE 10 MG: 5 TABLET ORAL at 02:07

## 2021-07-28 RX ADMIN — PROPOFOL 200 MG: 10 INJECTION, EMULSION INTRAVENOUS at 11:07

## 2021-07-28 RX ADMIN — ROCURONIUM BROMIDE 20 MG: 10 INJECTION, SOLUTION INTRAVENOUS at 12:07

## 2021-07-28 RX ADMIN — FENTANYL CITRATE 50 MCG: 50 INJECTION, SOLUTION INTRAMUSCULAR; INTRAVENOUS at 01:07

## 2021-07-28 RX ADMIN — ONDANSETRON 4 MG: 2 INJECTION INTRAMUSCULAR; INTRAVENOUS at 01:07

## 2021-07-28 RX ADMIN — ACETAMINOPHEN 1000 MG: 500 TABLET ORAL at 09:07

## 2021-07-28 RX ADMIN — FENTANYL CITRATE 100 MCG: 50 INJECTION, SOLUTION INTRAMUSCULAR; INTRAVENOUS at 11:07

## 2021-07-28 RX ADMIN — ROCURONIUM BROMIDE 50 MG: 10 INJECTION, SOLUTION INTRAVENOUS at 11:07

## 2021-07-28 RX ADMIN — FENTANYL CITRATE 25 MCG: 50 INJECTION INTRAMUSCULAR; INTRAVENOUS at 02:07

## 2021-07-28 RX ADMIN — SODIUM CHLORIDE: 0.9 INJECTION, SOLUTION INTRAVENOUS at 11:07

## 2021-07-28 RX ADMIN — SODIUM CHLORIDE: 0.9 INJECTION, SOLUTION INTRAVENOUS at 09:07

## 2021-07-28 RX ADMIN — CEFAZOLIN 2 G: 330 INJECTION, POWDER, FOR SOLUTION INTRAMUSCULAR; INTRAVENOUS at 11:07

## 2021-07-28 RX ADMIN — ROCURONIUM BROMIDE 10 MG: 10 INJECTION, SOLUTION INTRAVENOUS at 01:07

## 2021-07-28 RX ADMIN — FENTANYL CITRATE 25 MCG: 50 INJECTION INTRAMUSCULAR; INTRAVENOUS at 03:07

## 2021-07-28 RX ADMIN — NEOSTIGMINE METHYLSULFATE 5 MG: 0.5 INJECTION INTRAVENOUS at 01:07

## 2021-07-29 ENCOUNTER — PATIENT MESSAGE (OUTPATIENT)
Dept: SURGERY | Facility: CLINIC | Age: 46
End: 2021-07-29

## 2021-07-30 ENCOUNTER — PATIENT MESSAGE (OUTPATIENT)
Dept: SURGERY | Facility: CLINIC | Age: 46
End: 2021-07-30

## 2021-08-04 LAB
FINAL PATHOLOGIC DIAGNOSIS: NORMAL
Lab: NORMAL

## 2021-08-10 ENCOUNTER — OFFICE VISIT (OUTPATIENT)
Dept: SURGERY | Facility: CLINIC | Age: 46
End: 2021-08-10
Payer: COMMERCIAL

## 2021-08-10 VITALS
HEIGHT: 71 IN | DIASTOLIC BLOOD PRESSURE: 77 MMHG | WEIGHT: 245 LBS | BODY MASS INDEX: 34.3 KG/M2 | SYSTOLIC BLOOD PRESSURE: 133 MMHG | HEART RATE: 92 BPM

## 2021-08-10 DIAGNOSIS — Z48.89 POSTOPERATIVE VISIT: Primary | ICD-10-CM

## 2021-08-10 PROBLEM — K43.9 VENTRAL HERNIA WITHOUT OBSTRUCTION OR GANGRENE: Status: RESOLVED | Noted: 2020-03-05 | Resolved: 2021-08-10

## 2021-08-10 PROBLEM — K80.50 BILIARY COLIC: Status: RESOLVED | Noted: 2021-07-28 | Resolved: 2021-08-10

## 2021-08-10 PROCEDURE — 3078F PR MOST RECENT DIASTOLIC BLOOD PRESSURE < 80 MM HG: ICD-10-PCS | Mod: CPTII,S$GLB,, | Performed by: SURGERY

## 2021-08-10 PROCEDURE — 3075F SYST BP GE 130 - 139MM HG: CPT | Mod: CPTII,S$GLB,, | Performed by: SURGERY

## 2021-08-10 PROCEDURE — 99024 PR POST-OP FOLLOW-UP VISIT: ICD-10-PCS | Mod: S$GLB,,, | Performed by: SURGERY

## 2021-08-10 PROCEDURE — 3078F DIAST BP <80 MM HG: CPT | Mod: CPTII,S$GLB,, | Performed by: SURGERY

## 2021-08-10 PROCEDURE — 3075F PR MOST RECENT SYSTOLIC BLOOD PRESS GE 130-139MM HG: ICD-10-PCS | Mod: CPTII,S$GLB,, | Performed by: SURGERY

## 2021-08-10 PROCEDURE — 1159F PR MEDICATION LIST DOCUMENTED IN MEDICAL RECORD: ICD-10-PCS | Mod: CPTII,S$GLB,, | Performed by: SURGERY

## 2021-08-10 PROCEDURE — 1159F MED LIST DOCD IN RCRD: CPT | Mod: CPTII,S$GLB,, | Performed by: SURGERY

## 2021-08-10 PROCEDURE — 1126F PR PAIN SEVERITY QUANTIFIED, NO PAIN PRESENT: ICD-10-PCS | Mod: CPTII,S$GLB,, | Performed by: SURGERY

## 2021-08-10 PROCEDURE — 99999 PR PBB SHADOW E&M-EST. PATIENT-LVL III: ICD-10-PCS | Mod: PBBFAC,,, | Performed by: SURGERY

## 2021-08-10 PROCEDURE — 3008F BODY MASS INDEX DOCD: CPT | Mod: CPTII,S$GLB,, | Performed by: SURGERY

## 2021-08-10 PROCEDURE — 3008F PR BODY MASS INDEX (BMI) DOCUMENTED: ICD-10-PCS | Mod: CPTII,S$GLB,, | Performed by: SURGERY

## 2021-08-10 PROCEDURE — 99024 POSTOP FOLLOW-UP VISIT: CPT | Mod: S$GLB,,, | Performed by: SURGERY

## 2021-08-10 PROCEDURE — 1126F AMNT PAIN NOTED NONE PRSNT: CPT | Mod: CPTII,S$GLB,, | Performed by: SURGERY

## 2021-08-10 PROCEDURE — 99999 PR PBB SHADOW E&M-EST. PATIENT-LVL III: CPT | Mod: PBBFAC,,, | Performed by: SURGERY

## 2021-09-16 ENCOUNTER — OFFICE VISIT (OUTPATIENT)
Dept: FAMILY MEDICINE | Facility: CLINIC | Age: 46
End: 2021-09-16
Payer: COMMERCIAL

## 2021-09-16 VITALS
HEART RATE: 65 BPM | OXYGEN SATURATION: 98 % | HEIGHT: 71 IN | BODY MASS INDEX: 32.23 KG/M2 | WEIGHT: 230.19 LBS | DIASTOLIC BLOOD PRESSURE: 87 MMHG | TEMPERATURE: 99 F | SYSTOLIC BLOOD PRESSURE: 128 MMHG | RESPIRATION RATE: 18 BRPM

## 2021-09-16 DIAGNOSIS — I10 BENIGN ESSENTIAL HYPERTENSION: Primary | ICD-10-CM

## 2021-09-16 DIAGNOSIS — K21.9 GASTROESOPHAGEAL REFLUX DISEASE, UNSPECIFIED WHETHER ESOPHAGITIS PRESENT: ICD-10-CM

## 2021-09-16 DIAGNOSIS — Z12.5 PROSTATE CANCER SCREENING: ICD-10-CM

## 2021-09-16 DIAGNOSIS — F33.0 DEPRESSION, MAJOR, RECURRENT, MILD: ICD-10-CM

## 2021-09-16 DIAGNOSIS — E78.2 MIXED HYPERLIPIDEMIA: ICD-10-CM

## 2021-09-16 DIAGNOSIS — G47.33 OSA (OBSTRUCTIVE SLEEP APNEA): ICD-10-CM

## 2021-09-16 PROCEDURE — 3079F PR MOST RECENT DIASTOLIC BLOOD PRESSURE 80-89 MM HG: ICD-10-PCS | Mod: CPTII,S$GLB,, | Performed by: INTERNAL MEDICINE

## 2021-09-16 PROCEDURE — 3074F SYST BP LT 130 MM HG: CPT | Mod: CPTII,S$GLB,, | Performed by: INTERNAL MEDICINE

## 2021-09-16 PROCEDURE — 3008F BODY MASS INDEX DOCD: CPT | Mod: CPTII,S$GLB,, | Performed by: INTERNAL MEDICINE

## 2021-09-16 PROCEDURE — 1160F PR REVIEW ALL MEDS BY PRESCRIBER/CLIN PHARMACIST DOCUMENTED: ICD-10-PCS | Mod: CPTII,S$GLB,, | Performed by: INTERNAL MEDICINE

## 2021-09-16 PROCEDURE — 99214 PR OFFICE/OUTPT VISIT, EST, LEVL IV, 30-39 MIN: ICD-10-PCS | Mod: S$GLB,,, | Performed by: INTERNAL MEDICINE

## 2021-09-16 PROCEDURE — 4010F PR ACE/ARB THEARPY RXD/TAKEN: ICD-10-PCS | Mod: CPTII,S$GLB,, | Performed by: INTERNAL MEDICINE

## 2021-09-16 PROCEDURE — 99214 OFFICE O/P EST MOD 30 MIN: CPT | Mod: S$GLB,,, | Performed by: INTERNAL MEDICINE

## 2021-09-16 PROCEDURE — 1160F RVW MEDS BY RX/DR IN RCRD: CPT | Mod: CPTII,S$GLB,, | Performed by: INTERNAL MEDICINE

## 2021-09-16 PROCEDURE — 3074F PR MOST RECENT SYSTOLIC BLOOD PRESSURE < 130 MM HG: ICD-10-PCS | Mod: CPTII,S$GLB,, | Performed by: INTERNAL MEDICINE

## 2021-09-16 PROCEDURE — 99999 PR PBB SHADOW E&M-EST. PATIENT-LVL IV: ICD-10-PCS | Mod: PBBFAC,,, | Performed by: INTERNAL MEDICINE

## 2021-09-16 PROCEDURE — 1159F PR MEDICATION LIST DOCUMENTED IN MEDICAL RECORD: ICD-10-PCS | Mod: CPTII,S$GLB,, | Performed by: INTERNAL MEDICINE

## 2021-09-16 PROCEDURE — 1159F MED LIST DOCD IN RCRD: CPT | Mod: CPTII,S$GLB,, | Performed by: INTERNAL MEDICINE

## 2021-09-16 PROCEDURE — 4010F ACE/ARB THERAPY RXD/TAKEN: CPT | Mod: CPTII,S$GLB,, | Performed by: INTERNAL MEDICINE

## 2021-09-16 PROCEDURE — 3079F DIAST BP 80-89 MM HG: CPT | Mod: CPTII,S$GLB,, | Performed by: INTERNAL MEDICINE

## 2021-09-16 PROCEDURE — 3008F PR BODY MASS INDEX (BMI) DOCUMENTED: ICD-10-PCS | Mod: CPTII,S$GLB,, | Performed by: INTERNAL MEDICINE

## 2021-09-16 PROCEDURE — 99999 PR PBB SHADOW E&M-EST. PATIENT-LVL IV: CPT | Mod: PBBFAC,,, | Performed by: INTERNAL MEDICINE

## 2021-09-24 LAB
ALBUMIN: 4.7 GRAM/DL (ref 3.5–5)
ALP SERPL-CCNC: 53 UNIT/L (ref 38–126)
ALT SERPL W P-5'-P-CCNC: 23 UNIT/L (ref 7–56)
ANION GAP SERPL CALC-SCNC: 17 MEQ/L (ref 9–18)
AST SERPL-CCNC: 18 UNIT/L (ref 7–40)
BASOPHILS ABSOLUTE COUNT: 0 K/UL (ref 0–0.2)
BASOPHILS NFR BLD: 0.7 % (ref 0–2)
BILIRUB SERPL-MCNC: 0.3 MG/DL (ref 0–1.2)
BUN BLD-MCNC: 12 MG/DL (ref 7–21)
BUN/CREAT SERPL: 10 RATIO (ref 6–22)
CALC OSMOLALITY: 275 MOSM/KG (ref 275–295)
CALCIUM SERPL-MCNC: 9.7 MG/DL (ref 8.5–10.4)
CHLORIDE SERPL-SCNC: 100 MEQ/L (ref 98–107)
CHOL/HDLC RATIO: 4
CHOLEST SERPL-MSCNC: 154 MG/DL (ref 100–200)
CO2 SERPL-SCNC: 25 MEQ/L (ref 21–31)
CREAT SERPL-MCNC: 1.2 MG/DL (ref 0.7–1.2)
DIFFERENTIAL TYPE: ABNORMAL
EOSINOPHIL NFR BLD: 4.6 % (ref 0–4)
EOSINOPHILS ABSOLUTE COUNT: 0.2 K/UL (ref 0–0.7)
ERYTHROCYTE [DISTWIDTH] IN BLOOD BY AUTOMATED COUNT: 13.7 % (ref 12–15.3)
GFR: 63.7 ML/MIN/1.73M2
GLUCOSE SERPL-MCNC: 117 MG/DL (ref 70–100)
HCT VFR BLD AUTO: 42.6 % (ref 40–52)
HDLC SERPL-MCNC: 41 MG/DL (ref 40–75)
HGB BLD-MCNC: 14.7 GRAM/DL (ref 13.6–17.5)
LDLC SERPL CALC-MCNC: 97 MG/DL (ref 0–130)
LYMPHOCYTES %: 33.1 % (ref 15–45)
LYMPHOCYTES ABSOLUTE COUNT: 1.7 K/UL (ref 1–4.2)
MCH RBC QN AUTO: 28.4 PICOGRAM (ref 27–33)
MCHC RBC AUTO-ENTMCNC: 34.4 GRAM/DL (ref 32–36)
MCV RBC AUTO: 82.5 FEMTOLITER (ref 80–94)
MONOCYTES %: 6.4 % (ref 3–13)
MONOCYTES ABSOLUTE COUNT: 0.3 K/UL (ref 0.1–0.8)
NEUTROPHILS ABSOLUTE COUNT: 2.8 K/UL (ref 2.1–7.6)
NEUTROPHILS RELATIVE PERCENT: 55.2 % (ref 32–80)
NONHDLC SERPL-MCNC: 113 MG/DL (ref 60–125)
PLATELET # BLD AUTO: 247 K/UL (ref 150–350)
PMV BLD AUTO: 7.9 FEMTOLITER (ref 7–10.2)
POTASSIUM SERPL-SCNC: 5 MEQ/L (ref 3.5–5)
PROSTATE SPECIFIC ANTIGEN, TOTAL: 1.57 NANOGRAM/ML (ref 0–3.99)
RBC # BLD AUTO: 5.16 MIL/UL (ref 4.45–5.9)
SODIUM BLD-SCNC: 137 MEQ/L (ref 135–145)
TOTAL PROTEIN: 7.3 GRAM/DL (ref 6.3–8.2)
TRIGL SERPL-MCNC: 102 MG/DL (ref 30–150)
WBC # BLD AUTO: 5 K/UL (ref 4.5–11)

## 2021-10-28 ENCOUNTER — OFFICE VISIT (OUTPATIENT)
Dept: SPORTS MEDICINE | Facility: CLINIC | Age: 46
End: 2021-10-28
Payer: COMMERCIAL

## 2021-10-28 ENCOUNTER — APPOINTMENT (OUTPATIENT)
Dept: RADIOLOGY | Facility: OTHER | Age: 46
End: 2021-10-28
Attending: ORTHOPAEDIC SURGERY
Payer: COMMERCIAL

## 2021-10-28 ENCOUNTER — TELEPHONE (OUTPATIENT)
Dept: SPORTS MEDICINE | Facility: CLINIC | Age: 46
End: 2021-10-28
Payer: COMMERCIAL

## 2021-10-28 VITALS
SYSTOLIC BLOOD PRESSURE: 124 MMHG | BODY MASS INDEX: 32.2 KG/M2 | DIASTOLIC BLOOD PRESSURE: 90 MMHG | HEIGHT: 71 IN | WEIGHT: 230 LBS

## 2021-10-28 DIAGNOSIS — M75.41 IMPINGEMENT SYNDROME OF RIGHT SHOULDER: ICD-10-CM

## 2021-10-28 DIAGNOSIS — M25.511 ACUTE PAIN OF RIGHT SHOULDER: ICD-10-CM

## 2021-10-28 DIAGNOSIS — M75.21 BICEPS TENDINITIS, RIGHT: ICD-10-CM

## 2021-10-28 DIAGNOSIS — M25.511 ACUTE PAIN OF RIGHT SHOULDER: Primary | ICD-10-CM

## 2021-10-28 PROCEDURE — 1160F RVW MEDS BY RX/DR IN RCRD: CPT | Mod: CPTII,S$GLB,, | Performed by: ORTHOPAEDIC SURGERY

## 2021-10-28 PROCEDURE — 4010F PR ACE/ARB THEARPY RXD/TAKEN: ICD-10-PCS | Mod: CPTII,S$GLB,, | Performed by: ORTHOPAEDIC SURGERY

## 2021-10-28 PROCEDURE — 4010F ACE/ARB THERAPY RXD/TAKEN: CPT | Mod: CPTII,S$GLB,, | Performed by: ORTHOPAEDIC SURGERY

## 2021-10-28 PROCEDURE — 1159F PR MEDICATION LIST DOCUMENTED IN MEDICAL RECORD: ICD-10-PCS | Mod: CPTII,S$GLB,, | Performed by: ORTHOPAEDIC SURGERY

## 2021-10-28 PROCEDURE — 73030 X-RAY EXAM OF SHOULDER: CPT | Mod: TC,RT

## 2021-10-28 PROCEDURE — 3074F PR MOST RECENT SYSTOLIC BLOOD PRESSURE < 130 MM HG: ICD-10-PCS | Mod: CPTII,S$GLB,, | Performed by: ORTHOPAEDIC SURGERY

## 2021-10-28 PROCEDURE — 3074F SYST BP LT 130 MM HG: CPT | Mod: CPTII,S$GLB,, | Performed by: ORTHOPAEDIC SURGERY

## 2021-10-28 PROCEDURE — 97110 PR THERAPEUTIC EXERCISES: ICD-10-PCS | Mod: S$GLB,,, | Performed by: ORTHOPAEDIC SURGERY

## 2021-10-28 PROCEDURE — 3008F PR BODY MASS INDEX (BMI) DOCUMENTED: ICD-10-PCS | Mod: CPTII,S$GLB,, | Performed by: ORTHOPAEDIC SURGERY

## 2021-10-28 PROCEDURE — 3008F BODY MASS INDEX DOCD: CPT | Mod: CPTII,S$GLB,, | Performed by: ORTHOPAEDIC SURGERY

## 2021-10-28 PROCEDURE — 97110 THERAPEUTIC EXERCISES: CPT | Mod: S$GLB,,, | Performed by: ORTHOPAEDIC SURGERY

## 2021-10-28 PROCEDURE — 3080F PR MOST RECENT DIASTOLIC BLOOD PRESSURE >= 90 MM HG: ICD-10-PCS | Mod: CPTII,S$GLB,, | Performed by: ORTHOPAEDIC SURGERY

## 2021-10-28 PROCEDURE — 99999 PR PBB SHADOW E&M-EST. PATIENT-LVL III: ICD-10-PCS | Mod: PBBFAC,,, | Performed by: ORTHOPAEDIC SURGERY

## 2021-10-28 PROCEDURE — 3080F DIAST BP >= 90 MM HG: CPT | Mod: CPTII,S$GLB,, | Performed by: ORTHOPAEDIC SURGERY

## 2021-10-28 PROCEDURE — 99214 OFFICE O/P EST MOD 30 MIN: CPT | Mod: S$GLB,,, | Performed by: ORTHOPAEDIC SURGERY

## 2021-10-28 PROCEDURE — 99214 PR OFFICE/OUTPT VISIT, EST, LEVL IV, 30-39 MIN: ICD-10-PCS | Mod: S$GLB,,, | Performed by: ORTHOPAEDIC SURGERY

## 2021-10-28 PROCEDURE — 1160F PR REVIEW ALL MEDS BY PRESCRIBER/CLIN PHARMACIST DOCUMENTED: ICD-10-PCS | Mod: CPTII,S$GLB,, | Performed by: ORTHOPAEDIC SURGERY

## 2021-10-28 PROCEDURE — 73030 XR SHOULDER COMPLETE 2 OR MORE VIEWS RIGHT: ICD-10-PCS | Mod: 26,RT,, | Performed by: RADIOLOGY

## 2021-10-28 PROCEDURE — 73030 X-RAY EXAM OF SHOULDER: CPT | Mod: 26,RT,, | Performed by: RADIOLOGY

## 2021-10-28 PROCEDURE — 1159F MED LIST DOCD IN RCRD: CPT | Mod: CPTII,S$GLB,, | Performed by: ORTHOPAEDIC SURGERY

## 2021-10-28 PROCEDURE — 99999 PR PBB SHADOW E&M-EST. PATIENT-LVL III: CPT | Mod: PBBFAC,,, | Performed by: ORTHOPAEDIC SURGERY

## 2021-10-28 RX ORDER — MELOXICAM 15 MG/1
15 TABLET ORAL DAILY
Qty: 30 TABLET | Refills: 0 | Status: SHIPPED | OUTPATIENT
Start: 2021-10-28 | End: 2022-04-05 | Stop reason: SDUPTHER

## 2021-11-22 ENCOUNTER — PATIENT MESSAGE (OUTPATIENT)
Dept: FAMILY MEDICINE | Facility: CLINIC | Age: 46
End: 2021-11-22
Payer: COMMERCIAL

## 2022-01-04 ENCOUNTER — PATIENT MESSAGE (OUTPATIENT)
Dept: SPORTS MEDICINE | Facility: CLINIC | Age: 47
End: 2022-01-04
Payer: COMMERCIAL

## 2022-01-06 ENCOUNTER — PATIENT MESSAGE (OUTPATIENT)
Dept: ADMINISTRATIVE | Facility: OTHER | Age: 47
End: 2022-01-06
Payer: COMMERCIAL

## 2022-01-07 ENCOUNTER — LAB VISIT (OUTPATIENT)
Dept: PRIMARY CARE CLINIC | Facility: CLINIC | Age: 47
End: 2022-01-07
Payer: COMMERCIAL

## 2022-01-07 DIAGNOSIS — Z20.822 CONTACT WITH AND (SUSPECTED) EXPOSURE TO COVID-19: ICD-10-CM

## 2022-01-07 LAB
CTP QC/QA: YES
SARS-COV-2 AG RESP QL IA.RAPID: POSITIVE

## 2022-01-07 PROCEDURE — 87811 SARS-COV-2 COVID19 W/OPTIC: CPT

## 2022-01-14 NOTE — TELEPHONE ENCOUNTER
No new care gaps identified.  Powered by [employer]. Reference number: [id number].   1/14/2022 8:13:36 AM CST

## 2022-01-16 ENCOUNTER — PATIENT MESSAGE (OUTPATIENT)
Dept: INTERNAL MEDICINE | Facility: CLINIC | Age: 47
End: 2022-01-16
Payer: COMMERCIAL

## 2022-01-17 NOTE — TELEPHONE ENCOUNTER
No new care gaps identified.  Powered by [employer]. Reference number: [id number].   1/16/2022 6:00:15 PM CST

## 2022-01-18 ENCOUNTER — OFFICE VISIT (OUTPATIENT)
Dept: SPORTS MEDICINE | Facility: CLINIC | Age: 47
End: 2022-01-18
Payer: COMMERCIAL

## 2022-01-18 VITALS
BODY MASS INDEX: 33.74 KG/M2 | HEIGHT: 71 IN | HEART RATE: 80 BPM | WEIGHT: 241 LBS | DIASTOLIC BLOOD PRESSURE: 85 MMHG | SYSTOLIC BLOOD PRESSURE: 125 MMHG

## 2022-01-18 DIAGNOSIS — M99.04 SOMATIC DYSFUNCTION OF SACRAL REGION: ICD-10-CM

## 2022-01-18 DIAGNOSIS — M99.06 SOMATIC DYSFUNCTION OF LOWER EXTREMITY: ICD-10-CM

## 2022-01-18 DIAGNOSIS — M99.03 SOMATIC DYSFUNCTION OF LUMBAR REGION: ICD-10-CM

## 2022-01-18 DIAGNOSIS — M99.02 SOMATIC DYSFUNCTION OF THORACIC REGION: ICD-10-CM

## 2022-01-18 DIAGNOSIS — M54.59 MECHANICAL LOW BACK PAIN: Primary | ICD-10-CM

## 2022-01-18 DIAGNOSIS — M99.05 SOMATIC DYSFUNCTION OF PELVIS REGION: ICD-10-CM

## 2022-01-18 DIAGNOSIS — M53.3 SACROILIAC JOINT DYSFUNCTION OF RIGHT SIDE: ICD-10-CM

## 2022-01-18 PROCEDURE — 3008F PR BODY MASS INDEX (BMI) DOCUMENTED: ICD-10-PCS | Mod: CPTII,S$GLB,, | Performed by: ORTHOPAEDIC SURGERY

## 2022-01-18 PROCEDURE — 3008F BODY MASS INDEX DOCD: CPT | Mod: CPTII,S$GLB,, | Performed by: ORTHOPAEDIC SURGERY

## 2022-01-18 PROCEDURE — 99214 OFFICE O/P EST MOD 30 MIN: CPT | Mod: 25,S$GLB,, | Performed by: ORTHOPAEDIC SURGERY

## 2022-01-18 PROCEDURE — 1159F PR MEDICATION LIST DOCUMENTED IN MEDICAL RECORD: ICD-10-PCS | Mod: CPTII,S$GLB,, | Performed by: ORTHOPAEDIC SURGERY

## 2022-01-18 PROCEDURE — 98927 OSTEOPATH MANJ 5-6 REGIONS: CPT | Mod: S$GLB,,, | Performed by: ORTHOPAEDIC SURGERY

## 2022-01-18 PROCEDURE — 99214 PR OFFICE/OUTPT VISIT, EST, LEVL IV, 30-39 MIN: ICD-10-PCS | Mod: 25,S$GLB,, | Performed by: ORTHOPAEDIC SURGERY

## 2022-01-18 PROCEDURE — 3074F PR MOST RECENT SYSTOLIC BLOOD PRESSURE < 130 MM HG: ICD-10-PCS | Mod: CPTII,S$GLB,, | Performed by: ORTHOPAEDIC SURGERY

## 2022-01-18 PROCEDURE — 3074F SYST BP LT 130 MM HG: CPT | Mod: CPTII,S$GLB,, | Performed by: ORTHOPAEDIC SURGERY

## 2022-01-18 PROCEDURE — 3079F DIAST BP 80-89 MM HG: CPT | Mod: CPTII,S$GLB,, | Performed by: ORTHOPAEDIC SURGERY

## 2022-01-18 PROCEDURE — 1160F RVW MEDS BY RX/DR IN RCRD: CPT | Mod: CPTII,S$GLB,, | Performed by: ORTHOPAEDIC SURGERY

## 2022-01-18 PROCEDURE — 99999 PR PBB SHADOW E&M-EST. PATIENT-LVL III: ICD-10-PCS | Mod: PBBFAC,,, | Performed by: ORTHOPAEDIC SURGERY

## 2022-01-18 PROCEDURE — 1159F MED LIST DOCD IN RCRD: CPT | Mod: CPTII,S$GLB,, | Performed by: ORTHOPAEDIC SURGERY

## 2022-01-18 PROCEDURE — 1160F PR REVIEW ALL MEDS BY PRESCRIBER/CLIN PHARMACIST DOCUMENTED: ICD-10-PCS | Mod: CPTII,S$GLB,, | Performed by: ORTHOPAEDIC SURGERY

## 2022-01-18 PROCEDURE — 3079F PR MOST RECENT DIASTOLIC BLOOD PRESSURE 80-89 MM HG: ICD-10-PCS | Mod: CPTII,S$GLB,, | Performed by: ORTHOPAEDIC SURGERY

## 2022-01-18 PROCEDURE — 99999 PR PBB SHADOW E&M-EST. PATIENT-LVL III: CPT | Mod: PBBFAC,,, | Performed by: ORTHOPAEDIC SURGERY

## 2022-01-18 PROCEDURE — 98927 PR OSTEOPATHIC MANIP,5-6 BODY REGN: ICD-10-PCS | Mod: S$GLB,,, | Performed by: ORTHOPAEDIC SURGERY

## 2022-01-18 RX ORDER — MELOXICAM 15 MG/1
15 TABLET ORAL DAILY
Qty: 30 TABLET | Refills: 0 | Status: SHIPPED | OUTPATIENT
Start: 2022-01-18 | End: 2022-09-22

## 2022-01-18 RX ORDER — FENOFIBRATE 160 MG/1
160 TABLET ORAL DAILY
Qty: 90 TABLET | Refills: 3 | Status: SHIPPED | OUTPATIENT
Start: 2022-01-18 | End: 2023-01-07

## 2022-01-18 RX ORDER — BUPROPION HYDROCHLORIDE 300 MG/1
300 TABLET ORAL DAILY
Qty: 90 TABLET | Refills: 3 | Status: SHIPPED | OUTPATIENT
Start: 2022-01-18 | End: 2023-01-11 | Stop reason: SDUPTHER

## 2022-01-18 NOTE — PROGRESS NOTES
CC: low back pain    46 y.o. Male presents today for evaluation of his low back pain. He admits to low back pain for the past two years that increased after a 4-lion accident. He was previously evaluated in the Back and Spine clinic and did not appreciate improvement in his pain following this appointment. He gestures to the right side of his low back when asked where he hurts and states he has appreciated an area of swelling on the left side of his low back more recently. He denies radicular and red-flag symptoms.   How long: Admits to low back pain for the past two years.   What makes it better: Admits to improved pain while at rest.   What makes it worse: Admits to increased pain with getting out of bed and in proportion to his activity level.   Does it radiate: Denies radiating pain.   Numbness/tingling down legs: Denies numbness/tingling down legs.  Attempted treatments: He has been stretching and taking Mobic and ibuprofen as needed which has been helpful.   Pain score:  Any mechanical symptoms: Denies mechanical symptoms.   Feelings of instability: Admits to occasional feelings of instability.   Problems with ADLs: Admits to this problem affecting his ability to perform ADLs.     REVIEW OF SYSTEMS:   Constitution: Patient denies fever, chills, night sweats, and weight changes.  Eyes: Patient denies eye pain or vision changes.  HENT: Patient denies headache, ear pain, sore throat, or nasal discharge.  CVS: Patient denies chest pain.  Lungs: Patient denies shortness of breath or cough.  Abd: Patient denies stomach pain, nausea, or vomiting.  Skin: Patient denies skin rash or itching.    Hematologic/Lymphatic: Patient denies easy bruising.   Musculoskeletal: Patient denies recent falls. See HPI.  Psych: Patient denies any current anxiety or nervousness.    PAST MEDICAL HISTORY:   History reviewed. No pertinent past medical history.    PAST SURGICAL HISTORY:   Past Surgical History:   Procedure Laterality Date     ROBOT-ASSISTED CHOLECYSTECTOMY USING DA LORENE XI N/A 7/28/2021    Procedure: XI ROBOTIC CHOLECYSTECTOMY;  Surgeon: Brett Gastelum MD;  Location: [facility] OR [address] [place] [address];  Service: General;  Laterality: N/A;    ROBOT-ASSISTED LAPAROSCOPIC REPAIR OF EPIGASTRIC HERNIA N/A 7/28/2021    Procedure: ROBOTIC REPAIR, HERNIA, EPIGASTRIC;  Surgeon: Brett Gastelum MD;  Location: [facility] OR [address] [place] [address];  Service: General;  Laterality: N/A;  Incarcerated visceral hernia repair         FAMILY HISTORY:   Family History   Problem Relation Age of Onset    Asthma Mother     COPD Mother     Hearing loss Mother     Heart disease Mother     Hypertension Mother     Kidney disease Mother     Cancer Father     Heart disease Brother     Hypertension Brother        SOCIAL HISTORY:   Social History     Socioeconomic History    Marital status:    Tobacco Use    Smoking status: Never Smoker    Smokeless tobacco: Never Used   Substance and Sexual Activity    Alcohol use: Never    Drug use: Never    Sexual activity: Yes     Partners: Female     Birth control/protection: None     Social Determinants of Health     Financial Resource Strain: Low Risk     Difficulty of Paying Living Expenses: Not hard at all   Food Insecurity: No Food Insecurity    Worried About Running Out of Food in the Last Year: Never true    Ran Out of Food in the Last Year: Never true   Transportation Needs: No Transportation Needs    Lack of Transportation (Medical): No    Lack of Transportation (Non-Medical): No   Physical Activity: Sufficiently Active    Days of Exercise per Week: 3 days    Minutes of Exercise per Session: 60 min   Stress: Stress Concern Present    Feeling of Stress : To some extent   Social Connections: Unknown    Frequency of Communication with Friends and Family: More than three times a week    Frequency of Social Gatherings with Friends and Family: Once a week    Active Member of Clubs or Organizations: No    Attends Club  "or Organization Meetings: Never    Marital Status:        MEDICATIONS:     Current Outpatient Medications:     buPROPion (WELLBUTRIN XL) 300 MG 24 hr tablet, Take 1 tablet (300 mg total) by mouth once daily., Disp: 90 tablet, Rfl: 3    cetirizine (ZYRTEC) 10 MG tablet, Take 10 mg by mouth once daily., Disp: , Rfl:     docusate sodium (COLACE) 100 MG capsule, Take 1 capsule (100 mg total) by mouth 2 (two) times daily., Disp: 60 capsule, Rfl: 0    esomeprazole (NEXIUM) 40 MG capsule, TAKE 1 CAPSULE(40 MG) BY MOUTH EVERY DAY, Disp: 90 capsule, Rfl: 0    fenofibrate 160 MG Tab, Take 1 tablet (160 mg total) by mouth once daily., Disp: 90 tablet, Rfl: 3    irbesartan (AVAPRO) 300 MG tablet, TAKE 1 TABLET(300 MG) BY MOUTH EVERY DAY, Disp: 90 tablet, Rfl: 2    meloxicam (MOBIC) 15 MG tablet, Take 1 tablet (15 mg total) by mouth once daily., Disp: 30 tablet, Rfl: 0    meloxicam (MOBIC) 15 MG tablet, Take 1 tablet (15 mg total) by mouth once daily., Disp: 30 tablet, Rfl: 0    omega 3-dha-epa-fish oil (FISH OIL) 1,000 mg (120 mg-180 mg) Cap, Take 1,000 mg by mouth once daily., Disp: , Rfl:     ALLERGIES:   Review of patient's allergies indicates:  No Known Allergies     PHYSICAL EXAMINATION:  /85   Pulse 80   Ht 5' 11" (1.803 m)   Wt 109.3 kg (241 lb)   BMI 33.61 kg/m²   Vitals signs and nursing note have been reviewed.  General: In no acute distress, well developed, well nourished, no diaphoresis  Eyes: EOM full and smooth, no eye redness or discharge  HENT: normocephalic and atraumatic, neck supple, trachea midline, no nasal discharge, no external ear redness or discharge  Cardiovascular: no LE edema  Lungs: respirations non-labored, no conversational dyspnea   Abd: non-distended, no rigidity  MSK: no amputation or deformity, no swelling of extremities  Neuro: AAOx3, CN2-12 grossly intact  Skin: No rashes, warm and dry  Psychiatric: cooperative, pleasant, mood and affect appropriate for " age    Lumbar Spine: right lumbar region    Observation:    Normal cervicothoracolumbar curves.    No obvious pelvic obliquity while standing.    No edema, erythema, or ecchymosis noted in lumbosacral region.    No midline skin abnormalities.    No atrophy of lower limb musculature.    Tenderness:  + tenderness throughout the lumbar spine, iliolumbar region, posterior pelvis on the right.  + tenderness over the right sacrum,   No tenderness over the piriformis, greater/lesser trochanters.  No bony deformities or step-offs palpated.     Range of Motion:  Active flexion to 60°.   Active extension to 25°.   Active rotation to 30° on left and 30° on right.    Active sidebending to 25° on left and 25° on right.  Passive hip flexion to 135° on left and 135° on right.    Passive hip internal rotation to 45° on left and 45° on right.    Passive hip external rotation to 45° on left and 45° on right.    Mild pain with hip internal external rotation felt at the SI joint on the right    Strength Testing:  Hip flexion - 5/5 on left and 5/5 on right  Hip extension - 5/5 on left and 5/5 on right  Knee flexion - 5/5 on left and 5/5 on right  Knee extension - 5/5 on left and 5/5 on right  Dorsiflexion - 5/5 on left and 5/5 on right  Plantarflexion - 5/5 on left and 5/5 on right  Great toe extension - 5/5 on left and 5/5 on right    Special Tests:  Standing Nava's test - negative on left and negative on right  Stork test - negative on left and negative on right    Seated straight leg raise - negative on left and negative on right  Supine straight leg raise - negative on left and negative on right  Slump test - negative on left and negative on right  Provocation maneuvers exhibit no worsening of symptoms on left or on right.    LINUS test - negative  FADIR test - negative  Log roll test - negative      Neurovascular Exam:  Sensation intact to light touch in the L2-S2 dermatomes bilaterally.   No pretibial edema or abnormal hair  pattern of the shin.    Intact and symmetric DP and PT pulses bilaterally.  Normal gait without trendelenberg, heel walking, toe walking, and tandem walking.    Posture:  Upright and Anterior pelvic tilt with increased lumbar lordosis  Gait: Non-antalgic with Neutral ankle mechanics     TART (Tissue texture abnormality, Asymmetry,  Restriction of motion and/or Tenderness) changes:    Head:     Cervical Spine  Thoracic Spine  Lumbar Spine   C1  T1 Neutral L1 TTAR   C2  T2 Neutral L2 TTAR   C3  T3 Neutral L3 Neutral   C4  T4 Neutral L4 FRSL   C5  T5 Neutral L5 FRSR   C6  T6 Neutral     C7  T7 Neutral       T8 Neutral       T9 TTAR       T10 TTAR       T11 TTAR       T12 TTAR       Ribs:    Upper Extremity:    Abdomen:    Pelvis:  · Innominate:Right superior shear  · Pubic bone:Right superior pubic shear    Sacrum:Left unilateral sacral extension    Lower Extremity:  Internal tibial torsion on the right      Key   F= Flexed   E = Extended   R = Rotated   N = Neutral   S = Sidebent   TTA = tissue texture abnormality   L/R/B = left/right/bilateral (last letter)         ASSESSMENT:      ICD-10-CM ICD-9-CM   1. Mechanical low back pain  M54.59 724.2   2. Sacroiliac joint dysfunction of right side  M53.3 724.6   3. Somatic dysfunction of lumbar region  M99.03 739.3   4. Somatic dysfunction of pelvis region  M99.05 739.5   5. Somatic dysfunction of lower extremity  M99.06 739.6   6. Somatic dysfunction of sacral region  M99.04 739.4   7. Somatic dysfunction of thoracic region  M99.02 739.2         PLAN:  1-2.  Mechanical low back pain/SI joint dysfunction - new issue to provider    - Pako admits to right low back pain for the past two years without red-flag symptoms that increases with getting out of bed and in proportion to his activity level.    - Based on his description of pain/body language and somatic dysfunction identified on exam, I discussed osteopathic manipulation as a treatment option today. He consents to  evaluation and treatment. See below.    - HEP for abdominal bracing, ant marches, pelvic clocks 6-12, diaphragm breathing prescribed today. Handouts provided, explained, and exercises were demonstrated as needed. Encouraged to do daily. 15209 HOME EXERCISE PROGRAM (HEP):  The patient was taught a homegoing physical therapy regimen as described above by provider with assistance of sports medicine assistant. The patient demonstrated understanding of the exercises and proper technique of their execution. This interaction took 15 minutes.     - Mobic 15 mg to be taken daily for two weeks followed by as needed.       3-7.  Somatic dysfunction of lumbar, pelvis, lower extremity, sacral, thoracic regions -     - OMT 5-6 regions. Oral consent obtained. Reviewed benefits and potential side effects. OMT indicated today due to signs and symptoms as well as local and remote somatic dysfunction findings and their related neurokinetic, lymphatic, fascial and/or arteriovenous body connections. OMT techniques used: Soft Tissue, Myofascial Release, Muscle Energy and High Velocity Low Amplitude. Treatment was tolerated well. Improvement noted in segmental mobility post-treatment in dysfunctional regions. There were no adverse events and no complications immediately following treatment. Advised plenty of water to help alleviate soreness.      Future planning includes - possibly more OMT if helpful and if indicated, FDM,     All questions were answered to the best of my ability and all concerns were addressed at this time.    Follow up in 2-3 weeks for above, or sooner if needed.      This note is dictated using the M*Modal Fluency Direct word recognition program. There are word recognition mistakes that are occasionally missed on review.

## 2022-01-19 RX ORDER — BUPROPION HYDROCHLORIDE 300 MG/1
TABLET ORAL
Qty: 90 TABLET | Refills: 3 | OUTPATIENT
Start: 2022-01-19

## 2022-01-19 RX ORDER — FENOFIBRATE 160 MG/1
TABLET ORAL
Qty: 90 TABLET | Refills: 3 | OUTPATIENT
Start: 2022-01-19

## 2022-01-20 DIAGNOSIS — Z12.11 COLON CANCER SCREENING: Primary | ICD-10-CM

## 2022-01-20 NOTE — TELEPHONE ENCOUNTER
Jennifer DC. Request already responded to by other means (e.g. phone or fax)   Refill Authorization Note   Rl Grubbs  is requesting a refill authorization.  Brief Assessment and Rationale for Refill:  Quick Discontinue  Medication Therapy Plan:       Medication Reconciliation Completed:  No      Comments:   Pended Medication(s)       Requested Prescriptions     Refused Prescriptions Disp Refills    buPROPion (WELLBUTRIN XL) 300 MG 24 hr tablet [Pharmacy Med Name: BUPROPION XL 300MG TABLETS] 90 tablet 3     Sig: TAKE 1 TABLET(300 MG) BY MOUTH EVERY DAY     Refused By: JIM VALDEZ     Reason for Refusal: Request already responded to by other means (e.g. phone or fax)    fenofibrate 160 MG Tab [Pharmacy Med Name: FENOFIBRATE 160MG TABLETS] 90 tablet 3     Sig: TAKE 1 TABLET(160 MG) BY MOUTH EVERY DAY     Refused By: JIM VALDZE     Reason for Refusal: Request already responded to by other means (e.g. phone or fax)        Duplicate Pended Encounter(s)/ Last Prescribed Details: (includes pharmacy & prescriber details)   Ordering Encounter Report    Associated Reports   View Encounter          Note composed:7:47 PM 01/19/2022

## 2022-02-01 ENCOUNTER — OFFICE VISIT (OUTPATIENT)
Dept: SPORTS MEDICINE | Facility: CLINIC | Age: 47
End: 2022-02-01
Payer: COMMERCIAL

## 2022-02-01 VITALS
SYSTOLIC BLOOD PRESSURE: 129 MMHG | HEIGHT: 71 IN | DIASTOLIC BLOOD PRESSURE: 87 MMHG | WEIGHT: 241 LBS | BODY MASS INDEX: 33.74 KG/M2 | HEART RATE: 102 BPM

## 2022-02-01 DIAGNOSIS — M99.03 SOMATIC DYSFUNCTION OF LUMBAR REGION: ICD-10-CM

## 2022-02-01 DIAGNOSIS — M99.04 SOMATIC DYSFUNCTION OF SACRAL REGION: ICD-10-CM

## 2022-02-01 DIAGNOSIS — M99.05 SOMATIC DYSFUNCTION OF PELVIS REGION: ICD-10-CM

## 2022-02-01 DIAGNOSIS — M99.06 SOMATIC DYSFUNCTION OF LOWER EXTREMITY: ICD-10-CM

## 2022-02-01 DIAGNOSIS — M54.59 MECHANICAL LOW BACK PAIN: Primary | ICD-10-CM

## 2022-02-01 DIAGNOSIS — M99.02 SOMATIC DYSFUNCTION OF THORACIC REGION: ICD-10-CM

## 2022-02-01 DIAGNOSIS — M53.3 SACROILIAC JOINT DYSFUNCTION OF RIGHT SIDE: ICD-10-CM

## 2022-02-01 PROCEDURE — 99999 PR PBB SHADOW E&M-EST. PATIENT-LVL IV: CPT | Mod: PBBFAC,,, | Performed by: ORTHOPAEDIC SURGERY

## 2022-02-01 PROCEDURE — 99214 PR OFFICE/OUTPT VISIT, EST, LEVL IV, 30-39 MIN: ICD-10-PCS | Mod: 25,S$GLB,, | Performed by: ORTHOPAEDIC SURGERY

## 2022-02-01 PROCEDURE — 1159F MED LIST DOCD IN RCRD: CPT | Mod: CPTII,S$GLB,, | Performed by: ORTHOPAEDIC SURGERY

## 2022-02-01 PROCEDURE — 1159F PR MEDICATION LIST DOCUMENTED IN MEDICAL RECORD: ICD-10-PCS | Mod: CPTII,S$GLB,, | Performed by: ORTHOPAEDIC SURGERY

## 2022-02-01 PROCEDURE — 3079F DIAST BP 80-89 MM HG: CPT | Mod: CPTII,S$GLB,, | Performed by: ORTHOPAEDIC SURGERY

## 2022-02-01 PROCEDURE — 3008F PR BODY MASS INDEX (BMI) DOCUMENTED: ICD-10-PCS | Mod: CPTII,S$GLB,, | Performed by: ORTHOPAEDIC SURGERY

## 2022-02-01 PROCEDURE — 3074F SYST BP LT 130 MM HG: CPT | Mod: CPTII,S$GLB,, | Performed by: ORTHOPAEDIC SURGERY

## 2022-02-01 PROCEDURE — 98927 OSTEOPATH MANJ 5-6 REGIONS: CPT | Mod: S$GLB,,, | Performed by: ORTHOPAEDIC SURGERY

## 2022-02-01 PROCEDURE — 3074F PR MOST RECENT SYSTOLIC BLOOD PRESSURE < 130 MM HG: ICD-10-PCS | Mod: CPTII,S$GLB,, | Performed by: ORTHOPAEDIC SURGERY

## 2022-02-01 PROCEDURE — 98927 PR OSTEOPATHIC MANIP,5-6 BODY REGN: ICD-10-PCS | Mod: S$GLB,,, | Performed by: ORTHOPAEDIC SURGERY

## 2022-02-01 PROCEDURE — 1160F RVW MEDS BY RX/DR IN RCRD: CPT | Mod: CPTII,S$GLB,, | Performed by: ORTHOPAEDIC SURGERY

## 2022-02-01 PROCEDURE — 3079F PR MOST RECENT DIASTOLIC BLOOD PRESSURE 80-89 MM HG: ICD-10-PCS | Mod: CPTII,S$GLB,, | Performed by: ORTHOPAEDIC SURGERY

## 2022-02-01 PROCEDURE — 3008F BODY MASS INDEX DOCD: CPT | Mod: CPTII,S$GLB,, | Performed by: ORTHOPAEDIC SURGERY

## 2022-02-01 PROCEDURE — 99214 OFFICE O/P EST MOD 30 MIN: CPT | Mod: 25,S$GLB,, | Performed by: ORTHOPAEDIC SURGERY

## 2022-02-01 PROCEDURE — 1160F PR REVIEW ALL MEDS BY PRESCRIBER/CLIN PHARMACIST DOCUMENTED: ICD-10-PCS | Mod: CPTII,S$GLB,, | Performed by: ORTHOPAEDIC SURGERY

## 2022-02-01 PROCEDURE — 99999 PR PBB SHADOW E&M-EST. PATIENT-LVL IV: ICD-10-PCS | Mod: PBBFAC,,, | Performed by: ORTHOPAEDIC SURGERY

## 2022-02-01 NOTE — PROGRESS NOTES
CC: low back pain    Pako is here today for follow up evaluation of his low back pain. Patient reports his pain is 1/10 today. He is unsure if he appreciated significant improvement in his pain after his initial visit. He has been compliant with his HEP, but feels as though ant marches and pelvic clocks illicit an increase in pain. He has been taking Mobic as prescribed which he believes has been helpful. When asked where he hurts he gestures to the right side of his low back. He denies new falls or trauma since his last visit.     Recall from visit on 01/18/2022  46 y.o. Male presents today for evaluation of his low back pain. He admits to low back pain for the past two years that increased after a 4-lion accident. He was previously evaluated in the [facility] and did not appreciate improvement in his pain following this appointment. He gestures to the right side of his low back when asked where he hurts and states he has appreciated an area of swelling on the left side of his low back more recently. He denies radicular and red-flag symptoms.   How long: Admits to low back pain for the past two years.   What makes it better: Admits to improved pain while at rest.   What makes it worse: Admits to increased pain with getting out of bed and in proportion to his activity level.   Does it radiate: Denies radiating pain.   Numbness/tingling down legs: Denies numbness/tingling down legs.  Attempted treatments: He has been stretching and taking Mobic and ibuprofen as needed which has been helpful.   Pain score:  Any mechanical symptoms: Denies mechanical symptoms.   Feelings of instability: Admits to occasional feelings of instability.   Problems with ADLs: Admits to this problem affecting his ability to perform ADLs.     REVIEW OF SYSTEMS:   Constitution: Patient denies fever, chills, night sweats, and weight changes.  Eyes: Patient denies eye pain or vision changes.  HENT: Patient denies headache, ear  pain, sore throat, or nasal discharge.  CVS: Patient denies chest pain.  Lungs: Patient denies shortness of breath or cough.  Abd: Patient denies stomach pain, nausea, or vomiting.  Skin: Patient denies skin rash or itching.    Hematologic/Lymphatic: Patient denies easy bruising.   Musculoskeletal: Patient denies recent falls. See HPI.  Psych: Patient denies any current anxiety or nervousness.    PAST MEDICAL HISTORY:   History reviewed. No pertinent past medical history.    PAST SURGICAL HISTORY:   Past Surgical History:   Procedure Laterality Date    ROBOT-ASSISTED CHOLECYSTECTOMY USING DA LORENE XI N/A 7/28/2021    Procedure: XI ROBOTIC CHOLECYSTECTOMY;  Surgeon: Brett Gastelum MD;  Location: [address];  Service: General;  Laterality: N/A;    ROBOT-ASSISTED LAPAROSCOPIC REPAIR OF EPIGASTRIC HERNIA N/A 7/28/2021    Procedure: ROBOTIC REPAIR, HERNIA, EPIGASTRIC;  Surgeon: Brett Gastelum MD;  Location: [facility] OR [address] [place] [address];  Service: General;  Laterality: N/A;  Incarcerated visceral hernia repair         FAMILY HISTORY:   Family History   Problem Relation Age of Onset    Asthma Mother     COPD Mother     Hearing loss Mother     Heart disease Mother     Hypertension Mother     Kidney disease Mother     Cancer Father     Heart disease Brother     Hypertension Brother        SOCIAL HISTORY:   Social History     Socioeconomic History    Marital status:    Tobacco Use    Smoking status: Never Smoker    Smokeless tobacco: Never Used   Substance and Sexual Activity    Alcohol use: Never    Drug use: Never    Sexual activity: Yes     Partners: Female     Birth control/protection: None     Social Determinants of Health     Financial Resource Strain: Low Risk     Difficulty of Paying Living Expenses: Not hard at all   Food Insecurity: No Food Insecurity    Worried About Running Out of Food in the Last Year: Never true    Ran Out of Food in the Last Year: Never true   Transportation Needs: No  "Transportation Needs    Lack of Transportation (Medical): No    Lack of Transportation (Non-Medical): No   Physical Activity: Sufficiently Active    Days of Exercise per Week: 3 days    Minutes of Exercise per Session: 60 min   Stress: Stress Concern Present    Feeling of Stress : To some extent   Social Connections: Unknown    Frequency of Communication with Friends and Family: More than three times a week    Frequency of Social Gatherings with Friends and Family: Once a week    Active Member of Clubs or Organizations: No    Attends Club or Organization Meetings: Never    Marital Status:        MEDICATIONS:     Current Outpatient Medications:     buPROPion (WELLBUTRIN XL) 300 MG 24 hr tablet, Take 1 tablet (300 mg total) by mouth once daily., Disp: 90 tablet, Rfl: 3    cetirizine (ZYRTEC) 10 MG tablet, Take 10 mg by mouth once daily., Disp: , Rfl:     docusate sodium (COLACE) 100 MG capsule, Take 1 capsule (100 mg total) by mouth 2 (two) times daily., Disp: 60 capsule, Rfl: 0    esomeprazole (NEXIUM) 40 MG capsule, TAKE 1 CAPSULE(40 MG) BY MOUTH EVERY DAY, Disp: 90 capsule, Rfl: 0    fenofibrate 160 MG Tab, Take 1 tablet (160 mg total) by mouth once daily., Disp: 90 tablet, Rfl: 3    irbesartan (AVAPRO) 300 MG tablet, TAKE 1 TABLET(300 MG) BY MOUTH EVERY DAY, Disp: 90 tablet, Rfl: 2    meloxicam (MOBIC) 15 MG tablet, Take 1 tablet (15 mg total) by mouth once daily., Disp: 30 tablet, Rfl: 0    meloxicam (MOBIC) 15 MG tablet, Take 1 tablet (15 mg total) by mouth once daily., Disp: 30 tablet, Rfl: 0    omega 3-dha-epa-fish oil 1,000 mg (120 mg-180 mg) Cap, Take 1,000 mg by mouth once daily., Disp: , Rfl:     ALLERGIES:   Review of patient's allergies indicates:  No Known Allergies     PHYSICAL EXAMINATION:  /87   Pulse 102   Ht 5' 11" (1.803 m)   Wt 109.3 kg (241 lb)   BMI 33.61 kg/m²   Vitals signs and nursing note have been reviewed.  General: In no acute distress, well developed, " well nourished, no diaphoresis  Eyes: EOM full and smooth, no eye redness or discharge  HENT: normocephalic and atraumatic, neck supple, trachea midline, no nasal discharge, no external ear redness or discharge  Cardiovascular: no LE edema  Lungs: respirations non-labored, no conversational dyspnea   Abd: non-distended, no rigidity  MSK: no amputation or deformity, no swelling of extremities  Neuro: AAOx3, CN2-12 grossly intact  Skin: No rashes, warm and dry  Psychiatric: cooperative, pleasant, mood and affect appropriate for age    Lumbar Spine: right lumbar region    Observation:    Normal cervicothoracolumbar curves.    No obvious pelvic obliquity while standing.    No edema, erythema, or ecchymosis noted in lumbosacral region.    No midline skin abnormalities.    No atrophy of lower limb musculature.    Tenderness:  + tenderness throughout the lumbar spine, iliolumbar region, posterior pelvis (PSIS) on the right.  + tenderness over the right sacral base   No tenderness over the piriformis, greater/lesser trochanters.  No bony deformities or step-offs palpated.     Range of Motion:  Active flexion to 60°.   Active extension to 25°.   Active rotation to 30° on left and 30° on right.    Active sidebending to 25° on left and 25° on right.  Passive hip flexion to 135° on left and 135° on right.    Passive hip internal rotation to 45° on left and 45° on right.    Passive hip external rotation to 45° on left and 45° on right.    Mild pain with hip internal external rotation felt at the SI joint on the right    Strength Testing:  Hip flexion - 5/5 on left and 5/5 on right  Hip extension - 5/5 on left and 5/5 on right  Knee flexion - 5/5 on left and 5/5 on right  Knee extension - 5/5 on left and 5/5 on right  Dorsiflexion - 5/5 on left and 5/5 on right  Plantarflexion - 5/5 on left and 5/5 on right  Great toe extension - 5/5 on left and 5/5 on right    Special Tests:  Standing Nava's test - negative on left and negative  on right  Stork test - negative on left and negative on right    Seated straight leg raise - negative on left and negative on right  Supine straight leg raise - negative on left and negative on right  Slump test - negative on left and negative on right  Provocation maneuvers exhibit no worsening of symptoms on left or on right.    LINUS test - negative  FADIR test - negative  Log roll test - negative      Neurovascular Exam:  Sensation intact to light touch in the L2-S2 dermatomes bilaterally.   No pretibial edema or abnormal hair pattern of the shin.    Intact and symmetric DP and PT pulses bilaterally.  Normal gait without trendelenberg, heel walking, toe walking, and tandem walking.    Posture:  Upright and Anterior pelvic tilt with increased lumbar lordosis  Gait: Non-antalgic with Neutral ankle mechanics     TART (Tissue texture abnormality, Asymmetry,  Restriction of motion and/or Tenderness) changes:    Head:     Cervical Spine  Thoracic Spine  Lumbar Spine   C1  T1  L1 FRSR   C2  T2  L2 Neutral   C3  T3  L3 Neutral   C4  T4  L4 Neutral   C5  T5  L5 FRSL   C6  T6 Neutral     C7  T7 Neutral       T8 Neutral       T9 TTAR       T10 TTAR       T11 FRSR       T12 FRSR       Ribs:    Upper Extremity:    Abdomen:    Pelvis:  · Innominate:Right superior shear  · Pubic bone:Right superior pubic shear   · Fascial herniated trigger point at the right PSIS, superior aspect    Sacrum:Right on Left sacral torsion     Lower Extremity:  Internal tibial torsion on the right  Myofascial restriction right lower extremity    Key   F= Flexed   E = Extended   R = Rotated   N = Neutral   S = Sidebent   TTA = tissue texture abnormality   L/R/B = left/right/bilateral (last letter)         ASSESSMENT:      ICD-10-CM ICD-9-CM   1. Mechanical low back pain  M54.59 724.2   2. Sacroiliac joint dysfunction of right side  M53.3 724.6   3. Somatic dysfunction of lumbar region  M99.03 739.3   4. Somatic dysfunction of pelvis region  M99.05  739.5   5. Somatic dysfunction of lower extremity  M99.06 739.6   6. Somatic dysfunction of thoracic region  M99.02 739.2   7. Somatic dysfunction of sacral region  M99.04 739.4         PLAN:  1-2.  Mechanical low back pain/SI joint dysfunction - unchanged    - Pako admits to right low back pain for the past two years without red-flag symptoms that increases with getting out of bed and in proportion to his activity level. He has been compliant with his HEP and is unsure if he has appreciated improvement with this and OMT. He feels as though Mobic has been helpful.     - Based on his description of pain/body language and somatic dysfunction identified on exam, I discussed osteopathic manipulation as a treatment option today. He consents to evaluation and treatment. See below.    - HEP for abdominal bracing, ant marches, pelvic clocks 6-12, diaphragm breathing prescribed at initial visit.     - Continue with Mobic 15 mg as needed.       3-7.  Somatic dysfunction of lumbar, pelvis, lower extremity, sacral, thoracic regions -     - OMT 5-6 regions. Oral consent obtained. Reviewed benefits and potential side effects. OMT indicated today due to signs and symptoms as well as local and remote somatic dysfunction findings and their related neurokinetic, lymphatic, fascial and/or arteriovenous body connections. OMT techniques used: Soft Tissue, Myofascial Release, Muscle Energy, High Velocity Low Amplitude and Fascial Distortion Model. Treatment was tolerated well. Improvement noted in segmental mobility post-treatment in dysfunctional regions. There were no adverse events and no complications immediately following treatment. Advised plenty of water to help alleviate soreness.    - Of note, more attention was addressed to the fascial dysfunction at the posterior right pelvis in hopes that this now allows for symptom improvement      Future planning includes - possibly more OMT if helpful and if indicated, imaging if no  improvement is appreciated    All questions were answered to the best of my ability and all concerns were addressed at this time.    Follow up in 2 weeks for above, or sooner if needed.      This note is dictated using the M*Modal Fluency Direct word recognition program. There are word recognition mistakes that are occasionally missed on review.    Total time spent with patient: see X landon on chart below.   More than half of the time was spent counseling or coordinating care including prognosis, differential diagnosis, risks and benefits of treatment, instructions, compliance risk reductions     EST MINUTES X   [id number] 5    [id number] 10    [id number] 15    [id number] 25 x   [id number] 40    NEW     22064 10    81528 20    24420 30    [id number] 45    [id number] 60    PHONE      5-10    [id number] 11-20    [id number] 21-30

## 2022-02-15 ENCOUNTER — HOSPITAL ENCOUNTER (OUTPATIENT)
Dept: RADIOLOGY | Facility: HOSPITAL | Age: 47
Discharge: HOME | End: 2022-02-15
Attending: ORTHOPAEDIC SURGERY
Payer: COMMERCIAL

## 2022-02-15 ENCOUNTER — OFFICE VISIT (OUTPATIENT)
Dept: SPORTS MEDICINE | Facility: CLINIC | Age: 47
End: 2022-02-15
Payer: COMMERCIAL

## 2022-02-15 VITALS
DIASTOLIC BLOOD PRESSURE: 80 MMHG | HEIGHT: 71 IN | BODY MASS INDEX: 33.74 KG/M2 | HEART RATE: 92 BPM | SYSTOLIC BLOOD PRESSURE: 130 MMHG | WEIGHT: 241 LBS

## 2022-02-15 DIAGNOSIS — M99.02 SOMATIC DYSFUNCTION OF THORACIC REGION: ICD-10-CM

## 2022-02-15 DIAGNOSIS — M54.59 MECHANICAL LOW BACK PAIN: Primary | ICD-10-CM

## 2022-02-15 DIAGNOSIS — M21.70 LEG LENGTH DISCREPANCY: ICD-10-CM

## 2022-02-15 DIAGNOSIS — M99.04 SOMATIC DYSFUNCTION OF SACRAL REGION: ICD-10-CM

## 2022-02-15 DIAGNOSIS — M99.05 SOMATIC DYSFUNCTION OF PELVIS REGION: ICD-10-CM

## 2022-02-15 DIAGNOSIS — M47.816 LUMBAR SPONDYLOSIS: ICD-10-CM

## 2022-02-15 DIAGNOSIS — M53.3 SACROILIAC JOINT DYSFUNCTION OF RIGHT SIDE: ICD-10-CM

## 2022-02-15 DIAGNOSIS — M99.06 SOMATIC DYSFUNCTION OF LOWER EXTREMITY: ICD-10-CM

## 2022-02-15 DIAGNOSIS — M99.03 SOMATIC DYSFUNCTION OF LUMBAR REGION: ICD-10-CM

## 2022-02-15 DIAGNOSIS — M54.59 MECHANICAL LOW BACK PAIN: ICD-10-CM

## 2022-02-15 PROCEDURE — 1160F RVW MEDS BY RX/DR IN RCRD: CPT | Mod: CPTII,S$GLB,, | Performed by: ORTHOPAEDIC SURGERY

## 2022-02-15 PROCEDURE — 72100 XR LUMBAR SPINE 2 OR 3 VIEWS: ICD-10-PCS | Mod: 26,,, | Performed by: RADIOLOGY

## 2022-02-15 PROCEDURE — 99999 PR PBB SHADOW E&M-EST. PATIENT-LVL III: CPT | Mod: PBBFAC,,, | Performed by: ORTHOPAEDIC SURGERY

## 2022-02-15 PROCEDURE — 1159F MED LIST DOCD IN RCRD: CPT | Mod: CPTII,S$GLB,, | Performed by: ORTHOPAEDIC SURGERY

## 2022-02-15 PROCEDURE — 3075F PR MOST RECENT SYSTOLIC BLOOD PRESS GE 130-139MM HG: ICD-10-PCS | Mod: CPTII,S$GLB,, | Performed by: ORTHOPAEDIC SURGERY

## 2022-02-15 PROCEDURE — 99999 PR PBB SHADOW E&M-EST. PATIENT-LVL III: ICD-10-PCS | Mod: PBBFAC,,, | Performed by: ORTHOPAEDIC SURGERY

## 2022-02-15 PROCEDURE — 3079F PR MOST RECENT DIASTOLIC BLOOD PRESSURE 80-89 MM HG: ICD-10-PCS | Mod: CPTII,S$GLB,, | Performed by: ORTHOPAEDIC SURGERY

## 2022-02-15 PROCEDURE — 99214 OFFICE O/P EST MOD 30 MIN: CPT | Mod: 25,S$GLB,, | Performed by: ORTHOPAEDIC SURGERY

## 2022-02-15 PROCEDURE — 1159F PR MEDICATION LIST DOCUMENTED IN MEDICAL RECORD: ICD-10-PCS | Mod: CPTII,S$GLB,, | Performed by: ORTHOPAEDIC SURGERY

## 2022-02-15 PROCEDURE — 72100 X-RAY EXAM L-S SPINE 2/3 VWS: CPT | Mod: TC

## 2022-02-15 PROCEDURE — 99214 PR OFFICE/OUTPT VISIT, EST, LEVL IV, 30-39 MIN: ICD-10-PCS | Mod: 25,S$GLB,, | Performed by: ORTHOPAEDIC SURGERY

## 2022-02-15 PROCEDURE — 72100 X-RAY EXAM L-S SPINE 2/3 VWS: CPT | Mod: 26,,, | Performed by: RADIOLOGY

## 2022-02-15 PROCEDURE — 3075F SYST BP GE 130 - 139MM HG: CPT | Mod: CPTII,S$GLB,, | Performed by: ORTHOPAEDIC SURGERY

## 2022-02-15 PROCEDURE — 98927 OSTEOPATH MANJ 5-6 REGIONS: CPT | Mod: S$GLB,,, | Performed by: ORTHOPAEDIC SURGERY

## 2022-02-15 PROCEDURE — 3008F PR BODY MASS INDEX (BMI) DOCUMENTED: ICD-10-PCS | Mod: CPTII,S$GLB,, | Performed by: ORTHOPAEDIC SURGERY

## 2022-02-15 PROCEDURE — 98927 PR OSTEOPATHIC MANIP,5-6 BODY REGN: ICD-10-PCS | Mod: S$GLB,,, | Performed by: ORTHOPAEDIC SURGERY

## 2022-02-15 PROCEDURE — 1160F PR REVIEW ALL MEDS BY PRESCRIBER/CLIN PHARMACIST DOCUMENTED: ICD-10-PCS | Mod: CPTII,S$GLB,, | Performed by: ORTHOPAEDIC SURGERY

## 2022-02-15 PROCEDURE — 3079F DIAST BP 80-89 MM HG: CPT | Mod: CPTII,S$GLB,, | Performed by: ORTHOPAEDIC SURGERY

## 2022-02-15 PROCEDURE — 3008F BODY MASS INDEX DOCD: CPT | Mod: CPTII,S$GLB,, | Performed by: ORTHOPAEDIC SURGERY

## 2022-02-15 NOTE — PROGRESS NOTES
CC: low back pain    Pako is here today for follow up evaluation of his low back pain. Patient reports his pain is 1/10 today and he is feeling 30% improved since his last visit. He admits to continued compliance with his HEP and states he has not needed to take Mobic due to improved pain. When asked where he hurts he gestures to the right side of his low back. He denies new falls or trauma since his last visit.     Recall from visit on 02/01/2022  Pako is here today for follow up evaluation of his low back pain. Patient reports his pain is 1/10 today. He is unsure if he appreciated significant improvement in his pain after his initial visit. He has been compliant with his HEP, but feels as though ant marches and pelvic clocks illicit an increase in pain. He has been taking Mobic as prescribed which he believes has been helpful. When asked where he hurts he gestures to the right side of his low back. He denies new falls or trauma since his last visit.     Recall from visit on 01/18/2022  46 y.o. Male presents today for evaluation of his low back pain. He admits to low back pain for the past two years that increased after a 4-lion accident. He was previously evaluated in the [facility] and did not appreciate improvement in his pain following this appointment. He gestures to the right side of his low back when asked where he hurts and states he has appreciated an area of swelling on the left side of his low back more recently. He denies radicular and red-flag symptoms.   How long: Admits to low back pain for the past two years.   What makes it better: Admits to improved pain while at rest.   What makes it worse: Admits to increased pain with getting out of bed and in proportion to his activity level.   Does it radiate: Denies radiating pain.   Numbness/tingling down legs: Denies numbness/tingling down legs.  Attempted treatments: He has been stretching and taking Mobic and ibuprofen as needed which has  been helpful.   Pain score:  Any mechanical symptoms: Denies mechanical symptoms.   Feelings of instability: Admits to occasional feelings of instability.   Problems with ADLs: Admits to this problem affecting his ability to perform ADLs.     REVIEW OF SYSTEMS:   Constitution: Patient denies fever, chills, night sweats, and weight changes.  Eyes: Patient denies eye pain or vision changes.  HENT: Patient denies headache, ear pain, sore throat, or nasal discharge.  CVS: Patient denies chest pain.  Lungs: Patient denies shortness of breath or cough.  Abd: Patient denies stomach pain, nausea, or vomiting.  Skin: Patient denies skin rash or itching.    Hematologic/Lymphatic: Patient denies easy bruising.   Musculoskeletal: Patient denies recent falls. See HPI.  Psych: Patient denies any current anxiety or nervousness.    PAST MEDICAL HISTORY:   History reviewed. No pertinent past medical history.    PAST SURGICAL HISTORY:   Past Surgical History:   Procedure Laterality Date    ROBOT-ASSISTED CHOLECYSTECTOMY USING DA LORENE XI N/A 7/28/2021    Procedure: XI ROBOTIC CHOLECYSTECTOMY;  Surgeon: Brett Gastelum MD;  Location: [address];  Service: General;  Laterality: N/A;    ROBOT-ASSISTED LAPAROSCOPIC REPAIR OF EPIGASTRIC HERNIA N/A 7/28/2021    Procedure: ROBOTIC REPAIR, HERNIA, EPIGASTRIC;  Surgeon: Brett Gastelum MD;  Location: [facility] OR [address] [place] [address];  Service: General;  Laterality: N/A;  Incarcerated visceral hernia repair         FAMILY HISTORY:   Family History   Problem Relation Age of Onset    Asthma Mother     COPD Mother     Hearing loss Mother     Heart disease Mother     Hypertension Mother     Kidney disease Mother     Cancer Father     Heart disease Brother     Hypertension Brother        SOCIAL HISTORY:   Social History     Socioeconomic History    Marital status:    Tobacco Use    Smoking status: Never Smoker    Smokeless tobacco: Never Used   Substance and Sexual Activity     Alcohol use: Never    Drug use: Never    Sexual activity: Yes     Partners: Female     Birth control/protection: None     Social Determinants of Health     Financial Resource Strain: Low Risk     Difficulty of Paying Living Expenses: Not hard at all   Food Insecurity: No Food Insecurity    Worried About Running Out of Food in the Last Year: Never true    Ran Out of Food in the Last Year: Never true   Transportation Needs: No Transportation Needs    Lack of Transportation (Medical): No    Lack of Transportation (Non-Medical): No   Physical Activity: Sufficiently Active    Days of Exercise per Week: 3 days    Minutes of Exercise per Session: 60 min   Stress: Stress Concern Present    Feeling of Stress : To some extent   Social Connections: Unknown    Frequency of Communication with Friends and Family: More than three times a week    Frequency of Social Gatherings with Friends and Family: Once a week    Active Member of Clubs or Organizations: No    Attends Club or Organization Meetings: Never    Marital Status:        MEDICATIONS:     Current Outpatient Medications:     buPROPion (WELLBUTRIN XL) 300 MG 24 hr tablet, Take 1 tablet (300 mg total) by mouth once daily., Disp: 90 tablet, Rfl: 3    cetirizine (ZYRTEC) 10 MG tablet, Take 10 mg by mouth once daily., Disp: , Rfl:     docusate sodium (COLACE) 100 MG capsule, Take 1 capsule (100 mg total) by mouth 2 (two) times daily., Disp: 60 capsule, Rfl: 0    esomeprazole (NEXIUM) 40 MG capsule, TAKE 1 CAPSULE(40 MG) BY MOUTH EVERY DAY., Disp: 90 capsule, Rfl: 0    fenofibrate 160 MG Tab, Take 1 tablet (160 mg total) by mouth once daily., Disp: 90 tablet, Rfl: 3    irbesartan (AVAPRO) 300 MG tablet, TAKE 1 TABLET(300 MG) BY MOUTH EVERY DAY, Disp: 90 tablet, Rfl: 2    meloxicam (MOBIC) 15 MG tablet, Take 1 tablet (15 mg total) by mouth once daily., Disp: 30 tablet, Rfl: 0    meloxicam (MOBIC) 15 MG tablet, Take 1 tablet (15 mg total) by mouth  "once daily., Disp: 30 tablet, Rfl: 0    omega 3-dha-epa-fish oil 1,000 mg (120 mg-180 mg) Cap, Take 1,000 mg by mouth once daily., Disp: , Rfl:     ALLERGIES:   Review of patient's allergies indicates:  No Known Allergies     PHYSICAL EXAMINATION:  /80   Pulse 92   Ht 5' 11" (1.803 m)   Wt 109.3 kg (241 lb)   BMI 33.61 kg/m²   Vitals signs and nursing note have been reviewed.  General: In no acute distress, well developed, well nourished, no diaphoresis  Eyes: EOM full and smooth, no eye redness or discharge  HENT: normocephalic and atraumatic, neck supple, trachea midline, no nasal discharge, no external ear redness or discharge  Cardiovascular: no LE edema  Lungs: respirations non-labored, no conversational dyspnea   Abd: non-distended, no rigidity  MSK: no amputation or deformity, no swelling of extremities  Neuro: AAOx3, CN2-12 grossly intact  Skin: No rashes, warm and dry  Psychiatric: cooperative, pleasant, mood and affect appropriate for age    Lumbar Spine: right lumbar region    Observation:    Normal cervicothoracolumbar curves.    No obvious pelvic obliquity while standing.    No edema, erythema, or ecchymosis noted in lumbosacral region.    No midline skin abnormalities.    No atrophy of lower limb musculature.    Tenderness:  + tenderness throughout the lumbar spine, iliolumbar region, posterior pelvis (PSIS) on the right.  + tenderness over the right sacral base   No tenderness over the piriformis, greater/lesser trochanters.  No bony deformities or step-offs palpated.     Range of Motion:  Active flexion to 60°.   Active extension to 25°.   Active rotation to 30° on left and 30° on right.    Active sidebending to 25° on left and 25° on right.  Passive hip flexion to 135° on left and 135° on right.    Passive hip internal rotation to 45° on left and 45° on right.    Passive hip external rotation to 45° on left and 45° on right.    Mild pain with hip internal external rotation felt at the SI " joint on the right    Strength Testing:  Hip flexion - 5/5 on left and 5/5 on right  Hip extension - 5/5 on left and 5/5 on right  Knee flexion - 5/5 on left and 5/5 on right  Knee extension - 5/5 on left and 5/5 on right  Dorsiflexion - 5/5 on left and 5/5 on right  Plantarflexion - 5/5 on left and 5/5 on right  Great toe extension - 5/5 on left and 5/5 on right    Special Tests:  Standing Nava's test - negative on left and negative on right  Stork test - negative on left and negative on right    Seated straight leg raise - negative on left and negative on right  Supine straight leg raise - negative on left and negative on right  Slump test - negative on left and negative on right  Provocation maneuvers exhibit no worsening of symptoms on left or on right.    LINUS test - negative  FADIR test - negative  Log roll test - negative      Neurovascular Exam:  Sensation intact to light touch in the L2-S2 dermatomes bilaterally.   No pretibial edema or abnormal hair pattern of the shin.    Intact and symmetric DP and PT pulses bilaterally.  Normal gait without trendelenberg, heel walking, toe walking, and tandem walking.    Posture:  Upright and Anterior pelvic tilt with increased lumbar lordosis  Gait: Non-antalgic with Neutral ankle mechanics     TART (Tissue texture abnormality, Asymmetry,  Restriction of motion and/or Tenderness) changes:    Head:     Cervical Spine  Thoracic Spine  Lumbar Spine   C1  T1  L1 Neutral   C2  T2  L2 FRSR   C3  T3 Neutral L3 FRSR   C4  T4 Neutral L4 FRSR   C5  T5 Neutral L5 FRSL   C6  T6 Neutral     C7  T7 ERSL       T8 ERSL       T9 ERSL       T10 Neutral       T11 Neutral       T12 FRSR       Ribs:    Upper Extremity:    Abdomen:    Pelvis:  · Innominate:Right superior shear  · Pubic bone:Right superior pubic shear     Sacrum:Right on Left sacral torsion     Lower Extremity:  Internal tibial torsion on the right  Myofascial restriction right lower extremity  Short right leg, corrected  with OMT    Key   F= Flexed   E = Extended   R = Rotated   N = Neutral   S = Sidebent   TTA = tissue texture abnormality   L/R/B = left/right/bilateral (last letter)       IMAGIN. X-ray ordered due to low back pain   2. X-ray images were reviewed personally by me and then directly with patient.  3. FINDINGS: X-ray images obtained demonstrate chronic degenerative change of the lumbar spine, wedge deformity on T12, disc space narrowed most pronounced at L3-4, osteoarthritic changes of the lower lumbar facet joints  4. IMPRESSION: As above.       ASSESSMENT:      ICD-10-CM ICD-9-CM   1. Mechanical low back pain  M54.59 724.2   2. Lumbar spondylosis  M47.816 721.3   3. Sacroiliac joint dysfunction of right side  M53.3 724.6   4. Leg length discrepancy  M21.70 736.81   5. Somatic dysfunction of lumbar region  M99.03 739.3   6. Somatic dysfunction of pelvis region  M99.05 739.5   7. Somatic dysfunction of lower extremity  M99.06 739.6   8. Somatic dysfunction of sacral region  M99.04 739.4   9. Somatic dysfunction of thoracic region  M99.02 739.2         PLAN:  1-2.  Mechanical low back pain/SI joint dysfunction - improved  3-4. Lumbar spondylosis/leg-length discrepancy - new diagnosis    - Pako admits to right low back pain for the past two years without red-flag symptoms that increases with getting out of bed and in proportion to his activity level. He is feeling 50% improved which he attributes to OMT and compliance with his HEP. He has not had to continue taking Mobic as his pain is improved.    - XRs ordered in the office today and images were personally reviewed with the patient. See above for further detail.    - Based on his description of pain/body language and somatic dysfunction identified on exam, I discussed osteopathic manipulation as a treatment option today. He consents to evaluation and treatment. See below.    - HEP for abdominal bracing, ant marches, pelvic clocks 6-12, diaphragm breathing  prescribed at initial visit.  Continue with this.     - Continue with Mobic 15 mg as needed.     - 3 mm shoe lift on the right recommended.  Handout provided detailing this further.      5-9.  Somatic dysfunction of lumbar, pelvis, lower extremity, sacral, thoracic regions -     - OMT 5-6 regions. Oral consent obtained. Reviewed benefits and potential side effects. OMT indicated today due to signs and symptoms as well as local and remote somatic dysfunction findings and their related neurokinetic, lymphatic, fascial and/or arteriovenous body connections. OMT techniques used: Soft Tissue, Myofascial Release, Muscle Energy and High Velocity Low Amplitude. Treatment was tolerated well. Improvement noted in segmental mobility post-treatment in dysfunctional regions. There were no adverse events and no complications immediately following treatment. Advised plenty of water to help alleviate soreness.      Future planning includes - possibly more OMT if helpful and if indicated    All questions were answered to the best of my ability and all concerns were addressed at this time.    Follow up in 3 weeks for above, or sooner if needed.      This note is dictated using the M*Modal Fluency Direct word recognition program. There are word recognition mistakes that are occasionally missed on review.

## 2022-02-27 ENCOUNTER — PATIENT MESSAGE (OUTPATIENT)
Dept: SPORTS MEDICINE | Facility: CLINIC | Age: 47
End: 2022-02-27
Payer: COMMERCIAL

## 2022-03-08 ENCOUNTER — OFFICE VISIT (OUTPATIENT)
Dept: SPORTS MEDICINE | Facility: CLINIC | Age: 47
End: 2022-03-08
Payer: COMMERCIAL

## 2022-03-08 VITALS
HEART RATE: 83 BPM | WEIGHT: 241 LBS | DIASTOLIC BLOOD PRESSURE: 91 MMHG | BODY MASS INDEX: 33.74 KG/M2 | HEIGHT: 71 IN | SYSTOLIC BLOOD PRESSURE: 152 MMHG

## 2022-03-08 DIAGNOSIS — M99.04 SOMATIC DYSFUNCTION OF SACRAL REGION: ICD-10-CM

## 2022-03-08 DIAGNOSIS — M21.70 LEG LENGTH DISCREPANCY: ICD-10-CM

## 2022-03-08 DIAGNOSIS — M99.06 SOMATIC DYSFUNCTION OF LOWER EXTREMITY: ICD-10-CM

## 2022-03-08 DIAGNOSIS — M99.03 SOMATIC DYSFUNCTION OF LUMBAR REGION: ICD-10-CM

## 2022-03-08 DIAGNOSIS — M47.816 LUMBAR SPONDYLOSIS: Primary | ICD-10-CM

## 2022-03-08 DIAGNOSIS — M54.59 MECHANICAL LOW BACK PAIN: ICD-10-CM

## 2022-03-08 DIAGNOSIS — M53.3 SACROILIAC JOINT DYSFUNCTION OF RIGHT SIDE: ICD-10-CM

## 2022-03-08 DIAGNOSIS — M99.05 SOMATIC DYSFUNCTION OF PELVIS REGION: ICD-10-CM

## 2022-03-08 PROCEDURE — 99999 PR PBB SHADOW E&M-EST. PATIENT-LVL IV: ICD-10-PCS | Mod: PBBFAC,,, | Performed by: ORTHOPAEDIC SURGERY

## 2022-03-08 PROCEDURE — 3077F PR MOST RECENT SYSTOLIC BLOOD PRESSURE >= 140 MM HG: ICD-10-PCS | Mod: CPTII,S$GLB,, | Performed by: ORTHOPAEDIC SURGERY

## 2022-03-08 PROCEDURE — 1160F PR REVIEW ALL MEDS BY PRESCRIBER/CLIN PHARMACIST DOCUMENTED: ICD-10-PCS | Mod: CPTII,S$GLB,, | Performed by: ORTHOPAEDIC SURGERY

## 2022-03-08 PROCEDURE — 98926 PR OSTEOPATHIC MANIP,3-4 BODY REGN: ICD-10-PCS | Mod: S$GLB,,, | Performed by: ORTHOPAEDIC SURGERY

## 2022-03-08 PROCEDURE — 3008F BODY MASS INDEX DOCD: CPT | Mod: CPTII,S$GLB,, | Performed by: ORTHOPAEDIC SURGERY

## 2022-03-08 PROCEDURE — 98926 OSTEOPATH MANJ 3-4 REGIONS: CPT | Mod: S$GLB,,, | Performed by: ORTHOPAEDIC SURGERY

## 2022-03-08 PROCEDURE — 99214 OFFICE O/P EST MOD 30 MIN: CPT | Mod: 25,S$GLB,, | Performed by: ORTHOPAEDIC SURGERY

## 2022-03-08 PROCEDURE — 3080F PR MOST RECENT DIASTOLIC BLOOD PRESSURE >= 90 MM HG: ICD-10-PCS | Mod: CPTII,S$GLB,, | Performed by: ORTHOPAEDIC SURGERY

## 2022-03-08 PROCEDURE — 99999 PR PBB SHADOW E&M-EST. PATIENT-LVL IV: CPT | Mod: PBBFAC,,, | Performed by: ORTHOPAEDIC SURGERY

## 2022-03-08 PROCEDURE — 99214 PR OFFICE/OUTPT VISIT, EST, LEVL IV, 30-39 MIN: ICD-10-PCS | Mod: 25,S$GLB,, | Performed by: ORTHOPAEDIC SURGERY

## 2022-03-08 PROCEDURE — 1159F PR MEDICATION LIST DOCUMENTED IN MEDICAL RECORD: ICD-10-PCS | Mod: CPTII,S$GLB,, | Performed by: ORTHOPAEDIC SURGERY

## 2022-03-08 PROCEDURE — 3008F PR BODY MASS INDEX (BMI) DOCUMENTED: ICD-10-PCS | Mod: CPTII,S$GLB,, | Performed by: ORTHOPAEDIC SURGERY

## 2022-03-08 PROCEDURE — 3080F DIAST BP >= 90 MM HG: CPT | Mod: CPTII,S$GLB,, | Performed by: ORTHOPAEDIC SURGERY

## 2022-03-08 PROCEDURE — 1160F RVW MEDS BY RX/DR IN RCRD: CPT | Mod: CPTII,S$GLB,, | Performed by: ORTHOPAEDIC SURGERY

## 2022-03-08 PROCEDURE — 1159F MED LIST DOCD IN RCRD: CPT | Mod: CPTII,S$GLB,, | Performed by: ORTHOPAEDIC SURGERY

## 2022-03-08 PROCEDURE — 3077F SYST BP >= 140 MM HG: CPT | Mod: CPTII,S$GLB,, | Performed by: ORTHOPAEDIC SURGERY

## 2022-03-08 NOTE — PROGRESS NOTES
CC: low back pain    Pako is here today for follow up evaluation of his low back pain. Patient reports his pain is 1/10 today. He appreciated an increase his pain 02/26/2022 which he attributes to helping a friend around his house. He notes radiating pain down his right leg during this exacerbation. He states his pain has since improved. He has not been compliant with his HEP and has discontinued his shoe lift due to concern regarding his most recent increase in pain.      Recall from visit on 02/15/2022  Pako is here today for follow up evaluation of his low back pain. Patient reports his pain is 1/10 today and he is feeling 30% improved since his last visit. He admits to continued compliance with his HEP and states he has not needed to take Mobic due to improved pain. When asked where he hurts he gestures to the right side of his low back. He denies new falls or trauma since his last visit.     Recall from visit on 02/01/2022  Pako is here today for follow up evaluation of his low back pain. Patient reports his pain is 1/10 today. He is unsure if he appreciated significant improvement in his pain after his initial visit. He has been compliant with his HEP, but feels as though ant marches and pelvic clocks illicit an increase in pain. He has been taking Mobic as prescribed which he believes has been helpful. When asked where he hurts he gestures to the right side of his low back. He denies new falls or trauma since his last visit.     Recall from visit on 01/18/2022  46 y.o. Male presents today for evaluation of his low back pain. He admits to low back pain for the past two years that increased after a 4-lion accident. He was previously evaluated in the [facility] and did not appreciate improvement in his pain following this appointment. He gestures to the right side of his low back when asked where he hurts and states he has appreciated an area of swelling on the left side of his low back more  recently. He denies radicular and red-flag symptoms.   How long: Admits to low back pain for the past two years.   What makes it better: Admits to improved pain while at rest.   What makes it worse: Admits to increased pain with getting out of bed and in proportion to his activity level.   Does it radiate: Denies radiating pain.   Numbness/tingling down legs: Denies numbness/tingling down legs.  Attempted treatments: He has been stretching and taking Mobic and ibuprofen as needed which has been helpful.   Pain score:  Any mechanical symptoms: Denies mechanical symptoms.   Feelings of instability: Admits to occasional feelings of instability.   Problems with ADLs: Admits to this problem affecting his ability to perform ADLs.     REVIEW OF SYSTEMS:   Constitution: Patient denies fever, chills, night sweats, and weight changes.  Eyes: Patient denies eye pain or vision changes.  HENT: Patient denies headache, ear pain, sore throat, or nasal discharge.  CVS: Patient denies chest pain.  Lungs: Patient denies shortness of breath or cough.  Abd: Patient denies stomach pain, nausea, or vomiting.  Skin: Patient denies skin rash or itching.    Hematologic/Lymphatic: Patient denies easy bruising.   Musculoskeletal: Patient denies recent falls. See HPI.  Psych: Patient denies any current anxiety or nervousness.    PAST MEDICAL HISTORY:   History reviewed. No pertinent past medical history.    PAST SURGICAL HISTORY:   Past Surgical History:   Procedure Laterality Date    ROBOT-ASSISTED CHOLECYSTECTOMY USING DA LORENE XI N/A 7/28/2021    Procedure: XI ROBOTIC CHOLECYSTECTOMY;  Surgeon: Brett Gastelum MD;  Location: [address];  Service: General;  Laterality: N/A;    ROBOT-ASSISTED LAPAROSCOPIC REPAIR OF EPIGASTRIC HERNIA N/A 7/28/2021    Procedure: ROBOTIC REPAIR, HERNIA, EPIGASTRIC;  Surgeon: Brett Gastelum MD;  Location: [facility] OR [address] [place] [address];  Service: General;  Laterality: N/A;  Incarcerated visceral hernia repair          FAMILY HISTORY:   Family History   Problem Relation Age of Onset    Asthma Mother     COPD Mother     Hearing loss Mother     Heart disease Mother     Hypertension Mother     Kidney disease Mother     Cancer Father     Heart disease Brother     Hypertension Brother        SOCIAL HISTORY:   Social History     Socioeconomic History    Marital status:    Tobacco Use    Smoking status: Never Smoker    Smokeless tobacco: Never Used   Substance and Sexual Activity    Alcohol use: Never    Drug use: Never    Sexual activity: Yes     Partners: Female     Birth control/protection: None     Social Determinants of Health     Financial Resource Strain: Low Risk     Difficulty of Paying Living Expenses: Not hard at all   Food Insecurity: No Food Insecurity    Worried About Running Out of Food in the Last Year: Never true    Ran Out of Food in the Last Year: Never true   Transportation Needs: No Transportation Needs    Lack of Transportation (Medical): No    Lack of Transportation (Non-Medical): No   Physical Activity: Sufficiently Active    Days of Exercise per Week: 3 days    Minutes of Exercise per Session: 60 min   Stress: Stress Concern Present    Feeling of Stress : To some extent   Social Connections: Unknown    Frequency of Communication with Friends and Family: More than three times a week    Frequency of Social Gatherings with Friends and Family: Once a week    Active Member of Clubs or Organizations: No    Attends Club or Organization Meetings: Never    Marital Status:        MEDICATIONS:     Current Outpatient Medications:     buPROPion (WELLBUTRIN XL) 300 MG 24 hr tablet, Take 1 tablet (300 mg total) by mouth once daily., Disp: 90 tablet, Rfl: 3    cetirizine (ZYRTEC) 10 MG tablet, Take 10 mg by mouth once daily., Disp: , Rfl:     docusate sodium (COLACE) 100 MG capsule, Take 1 capsule (100 mg total) by mouth 2 (two) times daily., Disp: 60 capsule, Rfl: 0     "esomeprazole (NEXIUM) 40 MG capsule, TAKE 1 CAPSULE(40 MG) BY MOUTH EVERY DAY., Disp: 90 capsule, Rfl: 0    fenofibrate 160 MG Tab, Take 1 tablet (160 mg total) by mouth once daily., Disp: 90 tablet, Rfl: 3    irbesartan (AVAPRO) 300 MG tablet, TAKE 1 TABLET(300 MG) BY MOUTH EVERY DAY, Disp: 90 tablet, Rfl: 2    meloxicam (MOBIC) 15 MG tablet, Take 1 tablet (15 mg total) by mouth once daily., Disp: 30 tablet, Rfl: 0    omega 3-dha-epa-fish oil 1,000 mg (120 mg-180 mg) Cap, Take 1,000 mg by mouth once daily., Disp: , Rfl:     meloxicam (MOBIC) 15 MG tablet, Take 1 tablet (15 mg total) by mouth once daily., Disp: 30 tablet, Rfl: 0    ALLERGIES:   Review of patient's allergies indicates:  No Known Allergies     PHYSICAL EXAMINATION:  BP (!) 152/91   Pulse 83   Ht 5' 11" (1.803 m)   Wt 109.3 kg (241 lb)   BMI 33.61 kg/m²   Vitals signs and nursing note have been reviewed.  General: In no acute distress, well developed, well nourished, no diaphoresis  Eyes: EOM full and smooth, no eye redness or discharge  HENT: normocephalic and atraumatic, neck supple, trachea midline, no nasal discharge, no external ear redness or discharge  Cardiovascular: no LE edema  Lungs: respirations non-labored, no conversational dyspnea   Abd: non-distended, no rigidity  MSK: no amputation or deformity, no swelling of extremities  Neuro: AAOx3, CN2-12 grossly intact  Skin: No rashes, warm and dry  Psychiatric: cooperative, pleasant, mood and affect appropriate for age    Lumbar Spine: right lumbar region    Observation:    Normal cervicothoracolumbar curves.    No obvious pelvic obliquity while standing.    No edema, erythema, or ecchymosis noted in lumbosacral region.    No midline skin abnormalities.    No atrophy of lower limb musculature.    Tenderness:  + tenderness throughout the lumbar spine, iliolumbar region, posterior pelvis (PSIS) on the right.  + tenderness over the right sacral base   No tenderness over the piriformis, " greater/lesser trochanters.  No bony deformities or step-offs palpated.     Range of Motion:  Active flexion to 60°.   Active extension to 25°.   Active rotation to 30° on left and 30° on right.    Active sidebending to 25° on left and 25° on right.  Passive hip flexion to 135° on left and 135° on right.    Passive hip internal rotation to 45° on left and 45° on right.    Passive hip external rotation to 45° on left and 45° on right.    Mild pain with hip internal external rotation felt at the SI joint on the right    Strength Testing:  Hip flexion - 5/5 on left and 5/5 on right  Hip extension - 5/5 on left and 5/5 on right  Knee flexion - 5/5 on left and 5/5 on right  Knee extension - 5/5 on left and 5/5 on right  Dorsiflexion - 5/5 on left and 5/5 on right  Plantarflexion - 5/5 on left and 5/5 on right  Great toe extension - 5/5 on left and 5/5 on right    Special Tests:  Standing Nava's test - negative on left and negative on right  Stork test - negative on left and negative on right    Seated straight leg raise - negative on left and positive on right  Supine straight leg raise - negative on left and positive on right  Slump test - negative on left and negative on right  Provocation maneuvers exhibit no worsening of symptoms on left or on right.    LINUS test - negative  FADIR test - negative  Log roll test - negative    Neurovascular Exam:  Sensation intact to light touch in the L2-S2 dermatomes bilaterally.   No pretibial edema or abnormal hair pattern of the shin.    Intact and symmetric DP and PT pulses bilaterally.  Normal gait without trendelenberg, heel walking, toe walking, and tandem walking.    Posture:  Upright and Anterior pelvic tilt with increased lumbar lordosis  Gait: Non-antalgic with Neutral ankle mechanics     TART (Tissue texture abnormality, Asymmetry,  Restriction of motion and/or Tenderness) changes:    Head:     Cervical Spine  Thoracic Spine  Lumbar Spine   C1  T1  L1 Neutral   C2  T2   L2 Neutral   C3  T3 Neutral L3 FRSR   C4  T4 Neutral L4 FRSR   C5  T5 Neutral L5 FRSL   C6  T6 Neutral     C7  T7 Neutral       T8 Neutral       T9 Neutral       T10 Neutral       T11 Neutral       T12 Neutral       Ribs:    Upper Extremity:    Abdomen:    Pelvis:  · Innominate:Right superior shear  · Pubic bone:Right superior pubic shear     Sacrum:Right on Left sacral torsion     Lower Extremity:  Internal tibial torsion on the right  Myofascial restriction right lower extremity  Short right leg, corrected with OMT    Key   F= Flexed   E = Extended   R = Rotated   N = Neutral   S = Sidebent   TTA = tissue texture abnormality   L/R/B = left/right/bilateral (last letter)       IMAGIN. X-ray obtained 02/15/2022 due to low back pain   2. X-ray images were reviewed personally by me and then directly with patient.  3. FINDINGS: X-ray images obtained demonstrate chronic degenerative change of the lumbar spine, wedge deformity on T12, disc space narrowed most pronounced at L3-4, osteoarthritic changes of the lower lumbar facet joints  4. IMPRESSION: As above.       ASSESSMENT:      ICD-10-CM ICD-9-CM   1. Lumbar spondylosis  M47.816 721.3   2. Mechanical low back pain  M54.59 724.2   3. Sacroiliac joint dysfunction of right side  M53.3 724.6   4. Leg length discrepancy  M21.70 736.81   5. Somatic dysfunction of lumbar region  M99.03 739.3   6. Somatic dysfunction of pelvis region  M99.05 739.5   7. Somatic dysfunction of lower extremity  M99.06 739.6   8. Somatic dysfunction of sacral region  M99.04 739.4         PLAN:  1-2.  Mechanical low back pain/SI joint dysfunction -   3-4. Lumbar spondylosis/leg-length discrepancy -     - Pako admits to right low back pain for the past two years without red-flag symptoms that increases with getting out of bed and in proportion to his activity level. He continues to remain approximately 50% improved but experienced a severe increase in his pain with radiating pain to the right  leg 02/26/2022 that has since improved.     - Based on his description of pain/body language and somatic dysfunction identified on exam, I discussed osteopathic manipulation as a treatment option today. He consents to evaluation and treatment. See below.    - HEP for abdominal bracing, ant marches, pelvic clocks 6-12, diaphragm breathing prescribed at initial visit.  Continue with this.     - Continue with Mobic 15 mg as needed.     - MRI of the lumbar spine has been ordered as his symptoms are not consistently improved even with a consistent anti-inflammatory or with 6 weeks of a consistent home exercise program.       5-8.  Somatic dysfunction of lumbar, pelvis, lower extremity, sacral regions -     - OMT 3-4 regions. Oral consent obtained. Reviewed benefits and potential side effects. OMT indicated today due to signs and symptoms as well as local and remote somatic dysfunction findings and their related neurokinetic, lymphatic, fascial and/or arteriovenous body connections. OMT techniques used: Soft Tissue, Myofascial Release, Muscle Energy and High Velocity Low Amplitude. Treatment was tolerated well. Improvement noted in segmental mobility post-treatment in dysfunctional regions. There were no adverse events and no complications immediately following treatment. Advised plenty of water to help alleviate soreness.      Future planning includes - next steps pending MRI results, possibly more OMT if helpful and if indicated pending MRI    All questions were answered to the best of my ability and all concerns were addressed at this time.    Follow up after MRI via telemedicine for results and next steps.       This note is dictated using the M*Modal Fluency Direct word recognition program. There are word recognition mistakes that are occasionally missed on review.

## 2022-03-09 ENCOUNTER — PATIENT MESSAGE (OUTPATIENT)
Dept: SPORTS MEDICINE | Facility: CLINIC | Age: 47
End: 2022-03-09
Payer: COMMERCIAL

## 2022-03-17 ENCOUNTER — OFFICE VISIT (OUTPATIENT)
Dept: INTERNAL MEDICINE | Facility: CLINIC | Age: 47
End: 2022-03-17
Payer: COMMERCIAL

## 2022-03-17 VITALS
OXYGEN SATURATION: 97 % | SYSTOLIC BLOOD PRESSURE: 110 MMHG | HEART RATE: 84 BPM | HEIGHT: 71 IN | TEMPERATURE: 98 F | DIASTOLIC BLOOD PRESSURE: 80 MMHG | BODY MASS INDEX: 32.81 KG/M2 | WEIGHT: 234.38 LBS | RESPIRATION RATE: 18 BRPM

## 2022-03-17 DIAGNOSIS — M54.50 LUMBAR BACK PAIN: ICD-10-CM

## 2022-03-17 DIAGNOSIS — E78.2 MIXED HYPERLIPIDEMIA: ICD-10-CM

## 2022-03-17 DIAGNOSIS — F33.0 DEPRESSION, MAJOR, RECURRENT, MILD: ICD-10-CM

## 2022-03-17 DIAGNOSIS — G47.33 OSA (OBSTRUCTIVE SLEEP APNEA): Primary | ICD-10-CM

## 2022-03-17 PROCEDURE — 99999 PR PBB SHADOW E&M-EST. PATIENT-LVL IV: ICD-10-PCS | Mod: PBBFAC,,, | Performed by: INTERNAL MEDICINE

## 2022-03-17 PROCEDURE — 3074F SYST BP LT 130 MM HG: CPT | Mod: CPTII,S$GLB,, | Performed by: INTERNAL MEDICINE

## 2022-03-17 PROCEDURE — 99214 PR OFFICE/OUTPT VISIT, EST, LEVL IV, 30-39 MIN: ICD-10-PCS | Mod: S$GLB,,, | Performed by: INTERNAL MEDICINE

## 2022-03-17 PROCEDURE — 1159F MED LIST DOCD IN RCRD: CPT | Mod: CPTII,S$GLB,, | Performed by: INTERNAL MEDICINE

## 2022-03-17 PROCEDURE — 3074F PR MOST RECENT SYSTOLIC BLOOD PRESSURE < 130 MM HG: ICD-10-PCS | Mod: CPTII,S$GLB,, | Performed by: INTERNAL MEDICINE

## 2022-03-17 PROCEDURE — 1160F RVW MEDS BY RX/DR IN RCRD: CPT | Mod: CPTII,S$GLB,, | Performed by: INTERNAL MEDICINE

## 2022-03-17 PROCEDURE — 3008F BODY MASS INDEX DOCD: CPT | Mod: CPTII,S$GLB,, | Performed by: INTERNAL MEDICINE

## 2022-03-17 PROCEDURE — 1159F PR MEDICATION LIST DOCUMENTED IN MEDICAL RECORD: ICD-10-PCS | Mod: CPTII,S$GLB,, | Performed by: INTERNAL MEDICINE

## 2022-03-17 PROCEDURE — 99999 PR PBB SHADOW E&M-EST. PATIENT-LVL IV: CPT | Mod: PBBFAC,,, | Performed by: INTERNAL MEDICINE

## 2022-03-17 PROCEDURE — 3079F PR MOST RECENT DIASTOLIC BLOOD PRESSURE 80-89 MM HG: ICD-10-PCS | Mod: CPTII,S$GLB,, | Performed by: INTERNAL MEDICINE

## 2022-03-17 PROCEDURE — 4010F ACE/ARB THERAPY RXD/TAKEN: CPT | Mod: CPTII,S$GLB,, | Performed by: INTERNAL MEDICINE

## 2022-03-17 PROCEDURE — 1160F PR REVIEW ALL MEDS BY PRESCRIBER/CLIN PHARMACIST DOCUMENTED: ICD-10-PCS | Mod: CPTII,S$GLB,, | Performed by: INTERNAL MEDICINE

## 2022-03-17 PROCEDURE — 3079F DIAST BP 80-89 MM HG: CPT | Mod: CPTII,S$GLB,, | Performed by: INTERNAL MEDICINE

## 2022-03-17 PROCEDURE — 4010F PR ACE/ARB THEARPY RXD/TAKEN: ICD-10-PCS | Mod: CPTII,S$GLB,, | Performed by: INTERNAL MEDICINE

## 2022-03-17 PROCEDURE — 99214 OFFICE O/P EST MOD 30 MIN: CPT | Mod: S$GLB,,, | Performed by: INTERNAL MEDICINE

## 2022-03-17 PROCEDURE — 3008F PR BODY MASS INDEX (BMI) DOCUMENTED: ICD-10-PCS | Mod: CPTII,S$GLB,, | Performed by: INTERNAL MEDICINE

## 2022-03-17 NOTE — PROGRESS NOTES
[facility] Note    Chief Complaint      Chief Complaint   Patient presents with    Follow-up     6m        History of Present Illness      Rl Grubbs is a 46 y.o. male who presents today for   Chief Complaint   Patient presents with    Follow-up     6m    .  Patient comes to appointment here for 6m checkup for chronic issues as below  He is dealing with a lower back pain / radiculopathy is seeing dr frankel . He is complaint with all meds and admits he could be better with diet and exercise . He needs labs with next visit .     HPI    No problem-specific Assessment & Plan notes found for this encounter.       Problem List Items Addressed This Visit        Psychiatric    Depression, major, recurrent, mild    Overview     Cont welbutrin xl 300 mg is working and tolerating well is stable               Cardiac/Vascular    Mixed hyperlipidemia    Overview     Cont  fenofibrate tolerating well needs repeat labs next visit               Orthopedic    Lumbar back pain    Overview     Dr frankel managing has mri scheduled               Other    TOBIN (obstructive sleep apnea) - Primary    Overview     Utilizing cpap with great benefit all night every night .                    Past Medical History:  History reviewed. No pertinent past medical history.    Past Surgical History:  Past Surgical History:   Procedure Laterality Date    CHOLECYSTECTOMY  7-28-21    HERNIA REPAIR  7-    ROBOT-ASSISTED CHOLECYSTECTOMY USING DA LORENE XI N/A 07/28/2021    Procedure: XI ROBOTIC CHOLECYSTECTOMY;  Surgeon: Brett Gastelum MD;  Location: [facility] OR [address] [place] [address];  Service: General;  Laterality: N/A;    ROBOT-ASSISTED LAPAROSCOPIC REPAIR OF EPIGASTRIC HERNIA N/A 07/28/2021    Procedure: ROBOTIC REPAIR, HERNIA, EPIGASTRIC;  Surgeon: Brett Gastelum MD;  Location: [facility] OR [address] [place] [address];  Service: General;  Laterality: N/A;  Incarcerated visceral hernia repair         Family History:  family history includes  Asthma in his mother; COPD in his mother; Cancer in his father; Diabetes in his brother; Hearing loss in his mother; Heart disease in his brother and mother; Hypertension in his brother and mother; Kidney disease in his mother.     Social History:  Social History     Socioeconomic History    Marital status:    Tobacco Use    Smoking status: Never Smoker    Smokeless tobacco: Never Used   Substance and Sexual Activity    Alcohol use: Yes     Alcohol/week: 6.0 standard drinks     Types: 6 Cans of beer per week     Comment: Socially    Drug use: Never    Sexual activity: Yes     Partners: Female     Birth control/protection: None     Social Determinants of Health     Financial Resource Strain: Low Risk     Difficulty of Paying Living Expenses: Not hard at all   Food Insecurity: No Food Insecurity    Worried About Running Out of Food in the Last Year: Never true    Ran Out of Food in the Last Year: Never true   Transportation Needs: No Transportation Needs    Lack of Transportation (Medical): No    Lack of Transportation (Non-Medical): No   Physical Activity: Insufficiently Active    Days of Exercise per Week: 2 days    Minutes of Exercise per Session: 60 min   Stress: No Stress Concern Present    Feeling of Stress : Only a little   Social Connections: Unknown    Frequency of Communication with Friends and Family: Once a week    Frequency of Social Gatherings with Friends and Family: Once a week    Active Member of Clubs or Organizations: No    Attends Club or Organization Meetings: Patient refused    Marital Status:    Housing Stability: Low Risk     Unable to Pay for Housing in the Last Year: No    Number of Places Lived in the Last Year: 1    Unstable Housing in the Last Year: No       Review of Systems:   Review of Systems   HENT: Negative for hearing loss.    Eyes: Negative for discharge.   Respiratory: Negative for wheezing.    Cardiovascular: Negative for chest pain and  "palpitations.   Gastrointestinal: Negative for blood in stool, constipation, diarrhea and vomiting.   Genitourinary: Negative for hematuria and urgency.   Musculoskeletal: Negative for neck pain.   Neurological: Negative for weakness and headaches.   Endo/Heme/Allergies: Negative for polydipsia.        Medications:  Outpatient Encounter Medications as of 3/17/2022   Medication Sig Note Dispense Refill    buPROPion (WELLBUTRIN XL) 300 MG 24 hr tablet Take 1 tablet (300 mg total) by mouth once daily.  90 tablet 3    cetirizine (ZYRTEC) 10 MG tablet Take 10 mg by mouth once daily. 7/27/2021: Take as prescribed am of procedure                           docusate sodium (COLACE) 100 MG capsule Take 1 capsule (100 mg total) by mouth 2 (two) times daily. 7/27/2021: Hold am of surgery  60 capsule 0    esomeprazole (NEXIUM) 40 MG capsule TAKE 1 CAPSULE(40 MG) BY MOUTH EVERY DAY.  90 capsule 0    fenofibrate 160 MG Tab Take 1 tablet (160 mg total) by mouth once daily.  90 tablet 3    irbesartan (AVAPRO) 300 MG tablet TAKE 1 TABLET(300 MG) BY MOUTH EVERY DAY  90 tablet 2    meloxicam (MOBIC) 15 MG tablet Take 1 tablet (15 mg total) by mouth once daily.  30 tablet 0    omega 3-dha-epa-fish oil 1,000 mg (120 mg-180 mg) Cap Take 1,000 mg by mouth once daily.       meloxicam (MOBIC) 15 MG tablet Take 1 tablet (15 mg total) by mouth once daily.  30 tablet 0     No facility-administered encounter medications on file as of 3/17/2022.       Allergies:  Review of patient's allergies indicates:  No Known Allergies      Physical Exam      Vitals:    03/17/22 1600   BP: 110/80   Pulse: 84   Resp: 18   Temp: 98 °F (36.7 °C)        Vital Signs  Temp: 98 °F (36.7 °C)  Temp src: Oral  Pulse: 84  Resp: 18  SpO2: 97 %  BP: 110/80  BP Location: Right arm  Patient Position: Sitting  Pain Score: 0-No pain  Height and Weight  Height: 5' 11" (180.3 cm)  Weight: 106.3 kg (234 lb 5.6 oz)  BSA (Calculated - sq m): 2.31 sq meters  BMI " (Calculated): 32.7  Weight in (lb) to have BMI = 25: 178.9]     Body mass index is 32.69 kg/m².    Physical Exam  Constitutional:       Appearance: He is well-developed.   HENT:      Head: Normocephalic.   Eyes:      Pupils: Pupils are equal, round, and reactive to light.   Neck:      Thyroid: No thyromegaly.   Cardiovascular:      Rate and Rhythm: Normal rate and regular rhythm.      Heart sounds: No murmur heard.    No friction rub. No gallop.   Pulmonary:      Effort: Pulmonary effort is normal.      Breath sounds: Normal breath sounds.   Abdominal:      General: Bowel sounds are normal.      Palpations: Abdomen is soft.   Musculoskeletal:         General: Normal range of motion.      Cervical back: Normal range of motion.   Skin:     General: Skin is warm and dry.   Neurological:      Mental Status: He is alert and oriented to person, place, and time.      Sensory: No sensory deficit.   Psychiatric:         Behavior: Behavior normal.          Laboratory:  CBC:  No results for input(s): WBC, RBC, HGB, HCT, PLT, MCV, MCH, MCHC in the last 2160 hours.  CMP:  No results for input(s): GLU, CALCIUM, ALBUMIN, PROT, NA, K, CO2, CL, BUN, ALKPHOS, ALT, AST, BILITOT in the last 2160 hours.    Invalid input(s): CREATININ  URINALYSIS:  No results for input(s): COLORU, CLARITYU, SPECGRAV, PHUR, PROTEINUA, GLUCOSEU, BILIRUBINCON, BLOODU, WBCU, RBCU, BACTERIA, MUCUS, NITRITE, LEUKOCYTESUR, UROBILINOGEN, HYALINECASTS in the last 2160 hours.   LIPIDS:  No results for input(s): TSH, HDL, CHOL, TRIG, LDLCALC, CHOLHDL, NONHDLCHOL, TOTALCHOLEST in the last 2160 hours.  TSH:  No results for input(s): TSH in the last 2160 hours.  A1C:  No results for input(s): HGBA1C in the last 2160 hours.    Radiology:        Assessment:     Rl Grubbs is a 46 y.o.male with:    TOBIN (obstructive sleep apnea)    Mixed hyperlipidemia    Lumbar back pain    Depression, major, recurrent, mild                Plan:     Problem List Items Addressed  This Visit        Psychiatric    Depression, major, recurrent, mild    Overview     Cont welbutrin xl 300 mg is working and tolerating well is stable               Cardiac/Vascular    Mixed hyperlipidemia    Overview     Cont  fenofibrate tolerating well needs repeat labs next visit               Orthopedic    Lumbar back pain    Overview     Dr frankel managing has mri scheduled               Other    TOBIN (obstructive sleep apnea) - Primary    Overview     Utilizing cpap with great benefit all night every night .                   As above, continue current medications and maintain follow up with specialists.  Return to clinic in 6  months.      Frederick W Dantagnan [facility] [place]                  Answers for HPI/ROS submitted by the patient on 3/17/2022  activity change: No  unexpected weight change: No  rhinorrhea: No  trouble swallowing: No  visual disturbance: No  chest tightness: No  polyuria: No  difficulty urinating: No  joint swelling: No  arthralgias: No  confusion: No  dysphoric mood: No

## 2022-03-21 ENCOUNTER — PATIENT MESSAGE (OUTPATIENT)
Dept: SPORTS MEDICINE | Facility: CLINIC | Age: 47
End: 2022-03-21
Payer: COMMERCIAL

## 2022-04-05 ENCOUNTER — OFFICE VISIT (OUTPATIENT)
Dept: SPORTS MEDICINE | Facility: CLINIC | Age: 47
End: 2022-04-05
Payer: COMMERCIAL

## 2022-04-05 VITALS
SYSTOLIC BLOOD PRESSURE: 126 MMHG | WEIGHT: 234 LBS | DIASTOLIC BLOOD PRESSURE: 79 MMHG | BODY MASS INDEX: 32.76 KG/M2 | HEIGHT: 71 IN | HEART RATE: 87 BPM

## 2022-04-05 DIAGNOSIS — M99.05 SOMATIC DYSFUNCTION OF PELVIS REGION: ICD-10-CM

## 2022-04-05 DIAGNOSIS — M99.03 SOMATIC DYSFUNCTION OF LUMBAR REGION: ICD-10-CM

## 2022-04-05 DIAGNOSIS — M54.59 MECHANICAL LOW BACK PAIN: ICD-10-CM

## 2022-04-05 DIAGNOSIS — M47.816 LUMBAR SPONDYLOSIS: Primary | ICD-10-CM

## 2022-04-05 DIAGNOSIS — M53.3 SACROILIAC JOINT DYSFUNCTION OF RIGHT SIDE: ICD-10-CM

## 2022-04-05 DIAGNOSIS — M99.04 SOMATIC DYSFUNCTION OF SACRAL REGION: ICD-10-CM

## 2022-04-05 DIAGNOSIS — M21.70 LEG LENGTH DISCREPANCY: ICD-10-CM

## 2022-04-05 PROCEDURE — 3008F PR BODY MASS INDEX (BMI) DOCUMENTED: ICD-10-PCS | Mod: CPTII,S$GLB,, | Performed by: ORTHOPAEDIC SURGERY

## 2022-04-05 PROCEDURE — 1160F PR REVIEW ALL MEDS BY PRESCRIBER/CLIN PHARMACIST DOCUMENTED: ICD-10-PCS | Mod: CPTII,S$GLB,, | Performed by: ORTHOPAEDIC SURGERY

## 2022-04-05 PROCEDURE — 3074F SYST BP LT 130 MM HG: CPT | Mod: CPTII,S$GLB,, | Performed by: ORTHOPAEDIC SURGERY

## 2022-04-05 PROCEDURE — 99214 OFFICE O/P EST MOD 30 MIN: CPT | Mod: 25,S$GLB,, | Performed by: ORTHOPAEDIC SURGERY

## 2022-04-05 PROCEDURE — 99999 PR PBB SHADOW E&M-EST. PATIENT-LVL III: CPT | Mod: PBBFAC,,, | Performed by: ORTHOPAEDIC SURGERY

## 2022-04-05 PROCEDURE — 1159F MED LIST DOCD IN RCRD: CPT | Mod: CPTII,S$GLB,, | Performed by: ORTHOPAEDIC SURGERY

## 2022-04-05 PROCEDURE — 99999 PR PBB SHADOW E&M-EST. PATIENT-LVL III: ICD-10-PCS | Mod: PBBFAC,,, | Performed by: ORTHOPAEDIC SURGERY

## 2022-04-05 PROCEDURE — 3074F PR MOST RECENT SYSTOLIC BLOOD PRESSURE < 130 MM HG: ICD-10-PCS | Mod: CPTII,S$GLB,, | Performed by: ORTHOPAEDIC SURGERY

## 2022-04-05 PROCEDURE — 1159F PR MEDICATION LIST DOCUMENTED IN MEDICAL RECORD: ICD-10-PCS | Mod: CPTII,S$GLB,, | Performed by: ORTHOPAEDIC SURGERY

## 2022-04-05 PROCEDURE — 4010F PR ACE/ARB THEARPY RXD/TAKEN: ICD-10-PCS | Mod: CPTII,S$GLB,, | Performed by: ORTHOPAEDIC SURGERY

## 2022-04-05 PROCEDURE — 3078F PR MOST RECENT DIASTOLIC BLOOD PRESSURE < 80 MM HG: ICD-10-PCS | Mod: CPTII,S$GLB,, | Performed by: ORTHOPAEDIC SURGERY

## 2022-04-05 PROCEDURE — 3078F DIAST BP <80 MM HG: CPT | Mod: CPTII,S$GLB,, | Performed by: ORTHOPAEDIC SURGERY

## 2022-04-05 PROCEDURE — 98926 PR OSTEOPATHIC MANIP,3-4 BODY REGN: ICD-10-PCS | Mod: S$GLB,,, | Performed by: ORTHOPAEDIC SURGERY

## 2022-04-05 PROCEDURE — 99214 PR OFFICE/OUTPT VISIT, EST, LEVL IV, 30-39 MIN: ICD-10-PCS | Mod: 25,S$GLB,, | Performed by: ORTHOPAEDIC SURGERY

## 2022-04-05 PROCEDURE — 1160F RVW MEDS BY RX/DR IN RCRD: CPT | Mod: CPTII,S$GLB,, | Performed by: ORTHOPAEDIC SURGERY

## 2022-04-05 PROCEDURE — 4010F ACE/ARB THERAPY RXD/TAKEN: CPT | Mod: CPTII,S$GLB,, | Performed by: ORTHOPAEDIC SURGERY

## 2022-04-05 PROCEDURE — 3008F BODY MASS INDEX DOCD: CPT | Mod: CPTII,S$GLB,, | Performed by: ORTHOPAEDIC SURGERY

## 2022-04-05 PROCEDURE — 98926 OSTEOPATH MANJ 3-4 REGIONS: CPT | Mod: S$GLB,,, | Performed by: ORTHOPAEDIC SURGERY

## 2022-04-05 NOTE — PROGRESS NOTES
CC: low back pain    Pako is here today for follow up evaluation of his low back pain. Patient reports his pain is 0/10 today and feels as though he is improved since his last visit. He is here today to discuss his MRI results and next steps. He presents to clinic today with his MRI disc. He denies new falls or trauma since his last visit.     Recall from visit on 03/08/2022  Pako is here today for follow up evaluation of his low back pain. Patient reports his pain is 1/10 today. He appreciated an increase his pain 02/26/2022 which he attributes to helping a friend around his house. He notes radiating pain down his right leg during this exacerbation. He states his pain has since improved. He has not been compliant with his HEP and has discontinued his shoe lift due to concern regarding his most recent increase in pain.        REVIEW OF SYSTEMS:   Constitution: Patient denies fever, chills, night sweats, and weight changes.  Eyes: Patient denies eye pain or vision changes.  HENT: Patient denies headache, ear pain, sore throat, or nasal discharge.  CVS: Patient denies chest pain.  Lungs: Patient denies shortness of breath or cough.  Abd: Patient denies stomach pain, nausea, or vomiting.  Skin: Patient denies skin rash or itching.    Hematologic/Lymphatic: Patient denies easy bruising.   Musculoskeletal: Patient denies recent falls. See HPI.  Psych: Patient denies any current anxiety or nervousness.    PAST MEDICAL HISTORY:   History reviewed. No pertinent past medical history.    PAST SURGICAL HISTORY:   Past Surgical History:   Procedure Laterality Date    CHOLECYSTECTOMY  7-28-21    HERNIA REPAIR  7-    ROBOT-ASSISTED CHOLECYSTECTOMY USING DA LORENE XI N/A 07/28/2021    Procedure: XI ROBOTIC CHOLECYSTECTOMY;  Surgeon: Brett Gastelum MD;  Location: [facility] OR [address] [place] [address];  Service: General;  Laterality: N/A;    ROBOT-ASSISTED LAPAROSCOPIC REPAIR OF EPIGASTRIC HERNIA N/A 07/28/2021    Procedure: ROBOTIC  REPAIR, HERNIA, EPIGASTRIC;  Surgeon: Brett Gastelum MD;  Location: [facility] OR [address] [place] [address];  Service: General;  Laterality: N/A;  Incarcerated visceral hernia repair         FAMILY HISTORY:   Family History   Problem Relation Age of Onset    Asthma Mother     COPD Mother     Hearing loss Mother     Heart disease Mother     Hypertension Mother     Kidney disease Mother     Cancer Father     Heart disease Brother     Hypertension Brother     Diabetes Brother        SOCIAL HISTORY:   Social History     Socioeconomic History    Marital status:    Tobacco Use    Smoking status: Never Smoker    Smokeless tobacco: Never Used   Substance and Sexual Activity    Alcohol use: Yes     Alcohol/week: 6.0 standard drinks     Types: 6 Cans of beer per week     Comment: Socially    Drug use: Never    Sexual activity: Yes     Partners: Female     Birth control/protection: None     Social Determinants of Health     Financial Resource Strain: Low Risk     Difficulty of Paying Living Expenses: Not hard at all   Food Insecurity: No Food Insecurity    Worried About Running Out of Food in the Last Year: Never true    Ran Out of Food in the Last Year: Never true   Transportation Needs: No Transportation Needs    Lack of Transportation (Medical): No    Lack of Transportation (Non-Medical): No   Physical Activity: Insufficiently Active    Days of Exercise per Week: 2 days    Minutes of Exercise per Session: 60 min   Stress: No Stress Concern Present    Feeling of Stress : Only a little   Social Connections: Unknown    Frequency of Communication with Friends and Family: Once a week    Frequency of Social Gatherings with Friends and Family: Once a week    Active Member of Clubs or Organizations: No    Attends Club or Organization Meetings: Patient refused    Marital Status:    Housing Stability: Low Risk     Unable to Pay for Housing in the Last Year: No    Number of Places Lived in the Last Year: 1     "Unstable Housing in the Last Year: No       MEDICATIONS:     Current Outpatient Medications:     buPROPion (WELLBUTRIN XL) 300 MG 24 hr tablet, Take 1 tablet (300 mg total) by mouth once daily., Disp: 90 tablet, Rfl: 3    cetirizine (ZYRTEC) 10 MG tablet, Take 10 mg by mouth once daily., Disp: , Rfl:     docusate sodium (COLACE) 100 MG capsule, Take 1 capsule (100 mg total) by mouth 2 (two) times daily., Disp: 60 capsule, Rfl: 0    esomeprazole (NEXIUM) 40 MG capsule, TAKE 1 CAPSULE(40 MG) BY MOUTH EVERY DAY., Disp: 90 capsule, Rfl: 0    fenofibrate 160 MG Tab, Take 1 tablet (160 mg total) by mouth once daily., Disp: 90 tablet, Rfl: 3    irbesartan (AVAPRO) 300 MG tablet, TAKE 1 TABLET(300 MG) BY MOUTH EVERY DAY, Disp: 90 tablet, Rfl: 2    meloxicam (MOBIC) 15 MG tablet, Take 1 tablet (15 mg total) by mouth once daily., Disp: 30 tablet, Rfl: 0    omega 3-dha-epa-fish oil 1,000 mg (120 mg-180 mg) Cap, Take 1,000 mg by mouth once daily., Disp: , Rfl:     ALLERGIES:   Review of patient's allergies indicates:  No Known Allergies     PHYSICAL EXAMINATION:  /79   Pulse 87   Ht 5' 11" (1.803 m)   Wt 106.1 kg (234 lb)   BMI 32.64 kg/m²   Vitals signs and nursing note have been reviewed.  General: In no acute distress, well developed, well nourished, no diaphoresis  Eyes: EOM full and smooth, no eye redness or discharge  HENT: normocephalic and atraumatic, neck supple, trachea midline, no nasal discharge, no external ear redness or discharge  Cardiovascular: no LE edema  Lungs: respirations non-labored, no conversational dyspnea   Abd: non-distended, no rigidity  MSK: no amputation or deformity, no swelling of extremities  Neuro: AAOx3, CN2-12 grossly intact  Skin: No rashes, warm and dry  Psychiatric: cooperative, pleasant, mood and affect appropriate for age    Lumbar Spine: right lumbar region    Observation:    Normal cervicothoracolumbar curves.    No obvious pelvic obliquity while standing.    No " edema, erythema, or ecchymosis noted in lumbosacral region.    No midline skin abnormalities.    No atrophy of lower limb musculature.    Tenderness:  + tenderness throughout the lumbar spine, iliolumbar region, posterior pelvis (PSIS) on the right.  No tenderness over the right sacral base   No tenderness over the piriformis, greater/lesser trochanters.  No bony deformities or step-offs palpated.     Range of Motion:  Active flexion to 60°.   Active extension to 25°.   Active rotation to 30° on left and 30° on right.    Active sidebending to 25° on left and 25° on right.  Passive hip flexion to 135° on left and 135° on right.    Passive hip internal rotation to 45° on left and 45° on right.    Passive hip external rotation to 45° on left and 45° on right.    Mild pain with hip internal external rotation felt at the SI joint on the right    Strength Testing:  Hip flexion - 5/5 on left and 5/5 on right  Hip extension - 5/5 on left and 5/5 on right  Knee flexion - 5/5 on left and 5/5 on right  Knee extension - 5/5 on left and 5/5 on right  Dorsiflexion - 5/5 on left and 5/5 on right  Plantarflexion - 5/5 on left and 5/5 on right  Great toe extension - 5/5 on left and 5/5 on right    Special Tests:  Standing Nava's test - negative on left and negative on right  Stork test - negative on left and negative on right    Seated straight leg raise - negative on left and negative on right  Supine straight leg raise - negative on left and negative on right  Slump test - negative on left and negative on right  Provocation maneuvers exhibit no worsening of symptoms on left or on right.    LINUS test - negative  FADIR test - negative  Log roll test - negative    Neurovascular Exam:  Sensation intact to light touch in the L2-S2 dermatomes bilaterally.   No pretibial edema or abnormal hair pattern of the shin.    Intact and symmetric DP and PT pulses bilaterally.  Normal gait without trendelenberg, heel walking, toe walking, and  tandem walking.    Posture:  Upright and Anterior pelvic tilt with increased lumbar lordosis  Gait: Non-antalgic with Neutral ankle mechanics     TART (Tissue texture abnormality, Asymmetry,  Restriction of motion and/or Tenderness) changes:    Head:     Cervical Spine  Thoracic Spine  Lumbar Spine   C1  T1  L1 Neutral   C2  T2  L2 Neutral   C3  T3 Neutral L3 Neutral   C4  T4 Neutral L4 Neutral   C5  T5 Neutral L5 FRSL   C6  T6 Neutral     C7  T7 Neutral       T8 Neutral       T9 Neutral       T10 Neutral       T11 Neutral       T12 Neutral       Ribs:    Upper Extremity:    Abdomen:    Pelvis:  · Innominate:Right superior shear  · Pubic bone:Right superior pubic shear     Sacrum:Right on Left sacral torsion     Lower Extremity:      Key   F= Flexed   E = Extended   R = Rotated   N = Neutral   S = Sidebent   TTA = tissue texture abnormality   L/R/B = left/right/bilateral (last letter)       IMAGIN. X-ray obtained 02/15/2022 due to low back pain   2. X-ray images were reviewed personally by me and then directly with patient.  3. FINDINGS: X-ray images obtained demonstrate chronic degenerative change of the lumbar spine, wedge deformity on T12, disc space narrowed most pronounced at L3-4, osteoarthritic changes of the lower lumbar facet joints  4. IMPRESSION: As above.     1. MRI obtained 2022 due to low back pain  2. MRI images were reviewed personally by me and then directly with patient.  3. FINDINGS: MRI images obtained demonstrate broad based dorsal disc protrusion with mild thecal sac compression at L1.  Degenerative disc disease most noticeable at the L4 disc.  Mild degenerative changes of the lower lumbar spine.  No significant spinal or foraminal stenosis.  4. IMPRESSION: As above.       ASSESSMENT:      ICD-10-CM ICD-9-CM   1. Lumbar spondylosis  M47.816 721.3   2. Mechanical low back pain  M54.59 724.2   3. Sacroiliac joint dysfunction of right side  M53.3 724.6   4. Leg length discrepancy   M21.70 736.81   5. Somatic dysfunction of lumbar region  M99.03 739.3   6. Somatic dysfunction of pelvis region  M99.05 739.5   7. Somatic dysfunction of sacral region  M99.04 739.4         PLAN:  1-2.  Mechanical low back pain/SI joint dysfunction -   3-4. Lumbar spondylosis/leg-length discrepancy -     - Pako admits to right low back pain for the past two years without red-flag symptoms that increases with getting out of bed and in proportion to his activity level. He continues to remain approximately 50% improved but experienced a severe increase in his pain with radiating pain to the right leg 02/26/2022 that has since improved.     - MRI obtained 03/21/2022 and images were personally reviewed with the patient. Images were brought on an disc to appointment today along with printed report. See above for further detail.    - MRI and symptom improvement is reassuring.  He is no longer experiencing the radiating pain in his right leg.  As such, I think we are on the right track with addressing biomechanical restrictions and working on core strengthening and stabilizing.    - Based on his description of pain/body language and somatic dysfunction identified on exam, I discussed osteopathic manipulation as a treatment option today. He consents to evaluation and treatment. See below.    - HEP for abdominal bracing, ant marches, pelvic clocks 6-12, diaphragm breathing prescribed at initial visit.  Continue with this.     - Continue with Mobic 15 mg as needed.     - Referral to [facility] physical therapy placed today.     - We also discussed yoga as this has the potential to be beneficial for him.      5-7.  Somatic dysfunction of lumbar, pelvis, sacral regions -     - OMT 3-4 regions. Oral consent obtained. Reviewed benefits and potential side effects. OMT indicated today due to signs and symptoms as well as local and remote somatic dysfunction findings and their related neurokinetic, lymphatic, fascial and/or  arteriovenous body connections. OMT techniques used: Soft Tissue, Myofascial Release, Muscle Energy, High Velocity Low Amplitude and Fascial Distortion Model. Treatment was tolerated well. Improvement noted in segmental mobility post-treatment in dysfunctional regions. There were no adverse events and no complications immediately following treatment. Advised plenty of water to help alleviate soreness.      Future planning includes - possibly more OMT if helpful and if indicated    All questions were answered to the best of my ability and all concerns were addressed at this time.    Follow up after physical therapy or sooner if needed.       This note is dictated using the M*Modal Fluency Direct word recognition program. There are word recognition mistakes that are occasionally missed on review.      Total time spent with patient: see X landon on chart below.   More than half of the time was spent counseling or coordinating care including prognosis, differential diagnosis, risks and benefits of treatment, instructions, compliance risk reductions.    EST MINUTES X   [id number] 5    79351 10    15337 15    [id number] 25 X   [id number] 40    NEW     72385 10    00488 20    61406 30    81156 45    74854 60    PHONE      5-10    20305 11-20    99958 21-30

## 2022-04-26 ENCOUNTER — PATIENT MESSAGE (OUTPATIENT)
Dept: SPORTS MEDICINE | Facility: CLINIC | Age: 47
End: 2022-04-26
Payer: COMMERCIAL

## 2022-05-09 ENCOUNTER — CLINICAL SUPPORT (OUTPATIENT)
Dept: REHABILITATION | Facility: HOSPITAL | Age: 47
End: 2022-05-09
Attending: ORTHOPAEDIC SURGERY
Payer: COMMERCIAL

## 2022-05-09 DIAGNOSIS — R19.8 ABDOMINAL WEAKNESS: ICD-10-CM

## 2022-05-09 DIAGNOSIS — M47.816 LUMBAR SPONDYLOSIS: ICD-10-CM

## 2022-05-09 PROCEDURE — 97161 PT EVAL LOW COMPLEX 20 MIN: CPT
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
## 2022-05-12 RX ORDER — ESOMEPRAZOLE MAGNESIUM 40 MG/1
CAPSULE, DELAYED RELEASE ORAL
Qty: 90 CAPSULE | Refills: 3 | Status: SHIPPED | OUTPATIENT
Start: 2022-05-12 | End: 2023-05-08

## 2022-05-12 NOTE — TELEPHONE ENCOUNTER
No new care gaps identified.  [facility] Embedded Care Gaps. Reference number: [id number]. 5/12/2022   5:25:26 AM HERBT

## 2022-05-12 NOTE — TELEPHONE ENCOUNTER
Refill Authorization Note   Rl Grubbs  is requesting a refill authorization.  Brief Assessment and Rationale for Refill:  Approve     Medication Therapy Plan:  FOV:    Medication Reconciliation Completed: No   Comments:     No Care Gaps recommended.     Note composed:10:47 AM 05/12/2022

## 2022-05-16 ENCOUNTER — CLINICAL SUPPORT (OUTPATIENT)
Dept: REHABILITATION | Facility: HOSPITAL | Age: 47
End: 2022-05-16
Payer: COMMERCIAL

## 2022-05-16 DIAGNOSIS — R19.8 ABDOMINAL WEAKNESS: Primary | ICD-10-CM

## 2022-05-16 PROCEDURE — 97110 THERAPEUTIC EXERCISES: CPT

## 2022-05-16 PROCEDURE — 97140 MANUAL THERAPY 1/> REGIONS: CPT

## 2022-05-19 ENCOUNTER — CLINICAL SUPPORT (OUTPATIENT)
Dept: REHABILITATION | Facility: HOSPITAL | Age: 47
End: 2022-05-19
Payer: COMMERCIAL

## 2022-05-19 DIAGNOSIS — R19.8 ABDOMINAL WEAKNESS: Primary | ICD-10-CM

## 2022-05-19 PROCEDURE — 97140 MANUAL THERAPY 1/> REGIONS: CPT

## 2022-05-19 PROCEDURE — 97110 THERAPEUTIC EXERCISES: CPT

## 2022-05-19 NOTE — PROGRESS NOTES
Physical Therapy Daily Treatment Note     Name: Rl Grubbs  [facility] Number: [id number]    Therapy Diagnosis:   Encounter Diagnosis   Name Primary?    Abdominal weakness Yes     Physician: Dayne Puri DO    Visit Date: 5/19/2022       Physician Orders: PT Eval and Treat   Medical Diagnosis from Referral: M47.816 (ICD-10-CM) - Lumbar spondylosis  Evaluation Date: 5/9/2022  Authorization Period Expiration: TBD  Plan of Care Expiration: 8/9/2022  Progress Note Due: 6/9/2022  Visit # / Visits authorized: 1/ 1 1/20  FOTO: 1/5    Time In: 1:45  Time Out: 2:30  Total Billable Time: 45 minutes    Precautions: Standard    Subjective     Pt reports:.that his has slight ache in his lower back on the right side that comes and goes. Pt reports less pain getting in and out of bed.     He was compliant with home exercise program.  Response to previous treatment: sore  Functional change: None    Pain: 0/10  Location: right back      Objective     Pako received therapeutic exercises to develop strength, endurance, ROM, flexibility, posture and core stabilization for 15 minutes including:      Cat to Cow 15x  Hand heel rocks 15x  TA Contraction 10x5s hold  SLR 15x   Pelvic tilts  Bridges 2x15 Pelvic tilt   Clams 15x RTB        Pako received the following manual therapy techniques: Joint mobilizations were applied to the: Lumbar for 30 minutes, including:    C P/A Mobilization   Lumbar Rotation mobilization - PIVM    Home Exercises Provided and Patient Education Provided     Education provided:   - Transverse abdominal contraction throughout the day    Written Home Exercises Provided: yes.  Exercises were reviewed and Pako was able to demonstrate them prior to the end of the session.  Pako demonstrated good  understanding of the education provided.     See EMR under Patient Instructions for exercises provided prior visit.    Assessment     Pt had decreased pain with stretching into lumbar flexion. Pt had pain after  manual therapy but pt and I believe pain may be due to laying on his stomach. Plan to start next treatment with lumbar manipulation. Pt had decreased rotation to the Left with manual therapy. Pt needs to improve lumbar mobility as well as increase core stability.     Pako Is progressing well towards his goals.   Pt prognosis is Good.     Pt will continue to benefit from skilled outpatient physical therapy to address the deficits listed in the problem list box on initial evaluation, provide pt/family education and to maximize pt's level of independence in the home and community environment.     Pt's spiritual, cultural and educational needs considered and pt agreeable to plan of care and goals.     Anticipated barriers to physical therapy: None    Goals: Short Term Goals (4 Weeks):  1. Pt will be compliant with HEP to supplement PT in decreasing pain with functional mobility.  2. Pt will perform pallof press with good control to demonstrate improved core strength.  3. Pt will improve impaired LE MMTs by 1/2 score to improve strength for functional tasks.  Long Term Goals (8 Weeks):   1. Pt will improve FOTO score to </= 32% limited to decrease perceived limitation with maintaining/changing body position.   2. Pt will perform Supine to sit with good control to demonstrate improved core strength.  3. Pt will improve impaired LE MMTs by 1 score to improve strength for functional tasks.  4. Pt will report no pain during lumbar ROM to promote functional mobility.      PLAN   Plan of care Certification: 5/9/2022 to 8/9/2022     .Outpatient Physical Therapy 2 times weekly for 10 weeks to include the following interventions: Cervical/Lumbar Traction, Electrical Stimulation IFC, Tens, Dry needling, Manual Therapy, Moist Heat/ Ice, Neuromuscular Re-ed, Patient Education, Therapeutic Activities and Therapeutic Exercise.       Dario Greenberg, PT

## 2022-05-24 ENCOUNTER — CLINICAL SUPPORT (OUTPATIENT)
Dept: REHABILITATION | Facility: HOSPITAL | Age: 47
End: 2022-05-24
Payer: COMMERCIAL

## 2022-05-24 DIAGNOSIS — R19.8 ABDOMINAL WEAKNESS: Primary | ICD-10-CM

## 2022-05-24 PROCEDURE — 97110 THERAPEUTIC EXERCISES: CPT

## 2022-05-24 PROCEDURE — 97140 MANUAL THERAPY 1/> REGIONS: CPT

## 2022-05-24 NOTE — PROGRESS NOTES
Physical Therapy Daily Treatment Note     Name: Rl Grubbs  [facility] Number: [id number]    Therapy Diagnosis:   Encounter Diagnosis   Name Primary?    Abdominal weakness Yes     Physician: Dayne Puri DO    Visit Date: 5/24/2022       Physician Orders: PT Eval and Treat   Medical Diagnosis from Referral: M47.816 (ICD-10-CM) - Lumbar spondylosis  Evaluation Date: 5/9/2022  Authorization Period Expiration: TBD  Plan of Care Expiration: 8/9/2022  Progress Note Due: 6/9/2022  Visit # / Visits authorized: 1/ 1 3/20  FOTO: 3/5    Time In: 1:45  Time Out: 2:30  Total Billable Time: 45 minutes    Precautions: Standard    Subjective     Pt reports:.that reports that sudden movements seem to increase his pain. Such as flinching, sometimes sneezing also     He was compliant with home exercise program.  Response to previous treatment: sore  Functional change: None    Pain: 0/10  Location: right back      Objective     aPko received therapeutic exercises to develop strength, endurance, ROM, flexibility, posture and core stabilization for 30 minutes including:    Unilateral hip flexion stretch 10x each  Paloff press 30x  Pull downs 15x  Alternating cable rows 30x  Upright row 15x  Side steps Blue 20ft x 4     NP  Cat to Cow 15x  Hand heel rocks 15x  TA Contraction 10x5s hold  SLR 15x   Pelvic tilts  Bridges 2x15 Pelvic tilt   Clams 15x RTB      Pako received the following manual therapy techniques: Joint mobilizations were applied to the: Lumbar for 15 minutes, including:    Lumbar thrust  Hip Mobilization - posterior   trigger point massage    Home Exercises Provided and Patient Education Provided     Education provided:   - Transverse abdominal contraction throughout the day    Written Home Exercises Provided: yes.  Exercises were reviewed and Pako was able to demonstrate them prior to the end of the session.  Pako demonstrated good  understanding of the education provided.     See EMR under Patient Instructions  for exercises provided prior visit.    Assessment     Pt left session with no pain after lumbar manual therapy and standing core stabilization exercises. Pt had slight pain with lumbar thrust but felt better afterwards. Pt had no difficulty with standing core stabilization exercises and stated he felt encouraged he was able to perform them. Pt needs to continue to progress to more standing core stabilization exercises.       Pako Is progressing well towards his goals.   Pt prognosis is Good.     Pt will continue to benefit from skilled outpatient physical therapy to address the deficits listed in the problem list box on initial evaluation, provide pt/family education and to maximize pt's level of independence in the home and community environment.     Pt's spiritual, cultural and educational needs considered and pt agreeable to plan of care and goals.     Anticipated barriers to physical therapy: None    Goals: Short Term Goals (4 Weeks):  1. Pt will be compliant with HEP to supplement PT in decreasing pain with functional mobility.  2. Pt will perform pallof press with good control to demonstrate improved core strength.  3. Pt will improve impaired LE MMTs by 1/2 score to improve strength for functional tasks.  Long Term Goals (8 Weeks):   1. Pt will improve FOTO score to </= 32% limited to decrease perceived limitation with maintaining/changing body position.   2. Pt will perform Supine to sit with good control to demonstrate improved core strength.  3. Pt will improve impaired LE MMTs by 1 score to improve strength for functional tasks.  4. Pt will report no pain during lumbar ROM to promote functional mobility.      PLAN   Plan of care Certification: 5/9/2022 to 8/9/2022     .Outpatient Physical Therapy 2 times weekly for 10 weeks to include the following interventions: Cervical/Lumbar Traction, Electrical Stimulation IFC, Tens, Dry needling, Manual Therapy, Moist Heat/ Ice, Neuromuscular Re-ed, Patient  Education, Therapeutic Activities and Therapeutic Exercise.       Dario Greenberg, PT

## 2022-05-30 ENCOUNTER — CLINICAL SUPPORT (OUTPATIENT)
Dept: REHABILITATION | Facility: HOSPITAL | Age: 47
End: 2022-05-30
Payer: COMMERCIAL

## 2022-05-30 DIAGNOSIS — R19.8 ABDOMINAL WEAKNESS: Primary | ICD-10-CM

## 2022-05-30 PROCEDURE — 97110 THERAPEUTIC EXERCISES: CPT

## 2022-05-30 NOTE — PROGRESS NOTES
Physical Therapy Daily Treatment Note     Name: Rl Grubbs  [facility] Number: [id number]    Therapy Diagnosis:   Encounter Diagnosis   Name Primary?    Abdominal weakness Yes     Physician: Dayne Puri DO    Visit Date: 5/30/2022       Physician Orders: PT Eval and Treat   Medical Diagnosis from Referral: M47.816 (ICD-10-CM) - Lumbar spondylosis  Evaluation Date: 5/9/2022  Authorization Period Expiration: TBD  Plan of Care Expiration: 8/9/2022  Progress Note Due: 6/9/2022  Visit # / Visits authorized: 1/ 1 4/20  FOTO: 4/5    Time In: 10:10  Time Out: 10:53  Total Billable Time: 43 minutes    Precautions: Standard    Subjective     Pt reports:.that he feels much better this last week and feels like he is making progress. Has lower back pain with yard work after sometime.     He was compliant with home exercise program.  Response to previous treatment: sore  Functional change: None    Pain: 1/10 every now and then  Location: right back      Objective     Pako received therapeutic exercises to develop strength, endurance, ROM, flexibility, posture and core stabilization for 43 minutes including:    LSide steps Blue 20ft x 4   eg Press DL 2x10 3 cords  Leg Press SL 3x10 3 cords  RDL 2x10   Abdominal Crunches 3x10  Dying Bug 2x10  Pull downs 15x    NP  Unilateral hip flexion stretch 10x each  Paloff press 30x  Alternating cable rows 30x  Upright row 15x  Cat to Cow 15x  Hand heel rocks 15x  TA Contraction 10x5s hold  SLR 15x   Pelvic tilts  Bridges 2x15 Pelvic tilt   Clams 15x RTB      Pako received the following manual therapy techniques: Joint mobilizations were applied to the: Lumbar for 0 minutes, including:    Lumbar thrust  Hip Mobilization - posterior   trigger point massage    Home Exercises Provided and Patient Education Provided     Education provided:   - Transverse abdominal contraction throughout the day    Written Home Exercises Provided: yes.  Exercises were reviewed and Pako was able to  demonstrate them prior to the end of the session.  Pako demonstrated good  understanding of the education provided.     See EMR under Patient Instructions for exercises provided prior visit.    Assessment     Pt had pain when trying to get up off of leg press in a sit up motion. Pt did not have pain when same motion was performed with assistance. Pt reported muscle fatigue and weakness with abdominal crunches and dying bugs.. Pt needs to continue to increase abdominal strength and core stability in order to decrease lower back pain.    Pako Is progressing well towards his goals.   Pt prognosis is Good.     Pt will continue to benefit from skilled outpatient physical therapy to address the deficits listed in the problem list box on initial evaluation, provide pt/family education and to maximize pt's level of independence in the home and community environment.     Pt's spiritual, cultural and educational needs considered and pt agreeable to plan of care and goals.     Anticipated barriers to physical therapy: None    Goals: Short Term Goals (4 Weeks):  1. Pt will be compliant with HEP to supplement PT in decreasing pain with functional mobility.  2. Pt will perform pallof press with good control to demonstrate improved core strength.  3. Pt will improve impaired LE MMTs by 1/2 score to improve strength for functional tasks.  Long Term Goals (8 Weeks):   1. Pt will improve FOTO score to </= 32% limited to decrease perceived limitation with maintaining/changing body position.   2. Pt will perform Supine to sit with good control to demonstrate improved core strength.  3. Pt will improve impaired LE MMTs by 1 score to improve strength for functional tasks.  4. Pt will report no pain during lumbar ROM to promote functional mobility.      PLAN   Plan of care Certification: 5/9/2022 to 8/9/2022     .Outpatient Physical Therapy 2 times weekly for 10 weeks to include the following interventions: Cervical/Lumbar Traction,  Electrical Stimulation IFC, Tens, Dry needling, Manual Therapy, Moist Heat/ Ice, Neuromuscular Re-ed, Patient Education, Therapeutic Activities and Therapeutic Exercise.       Dario Greenberg, PT

## 2022-06-01 ENCOUNTER — CLINICAL SUPPORT (OUTPATIENT)
Dept: REHABILITATION | Facility: HOSPITAL | Age: 47
End: 2022-06-01
Payer: COMMERCIAL

## 2022-06-01 DIAGNOSIS — R19.8 ABDOMINAL WEAKNESS: Primary | ICD-10-CM

## 2022-06-01 PROCEDURE — 97110 THERAPEUTIC EXERCISES: CPT

## 2022-06-07 ENCOUNTER — CLINICAL SUPPORT (OUTPATIENT)
Dept: REHABILITATION | Facility: HOSPITAL | Age: 47
End: 2022-06-07
Payer: COMMERCIAL

## 2022-06-07 DIAGNOSIS — R19.8 ABDOMINAL WEAKNESS: Primary | ICD-10-CM

## 2022-06-07 PROCEDURE — 97110 THERAPEUTIC EXERCISES: CPT

## 2022-06-07 NOTE — PROGRESS NOTES
Physical Therapy Daily Treatment Note     Name: Rl Grubbs  Clinic Number: [id number]    Therapy Diagnosis:   Encounter Diagnosis   Name Primary?    Abdominal weakness Yes     Physician: Dayne Puri DO    Visit Date: 6/7/2022       Physician Orders: PT Eval and Treat   Medical Diagnosis from Referral: M47.816 (ICD-10-CM) - Lumbar spondylosis  Evaluation Date: 5/9/2022  Authorization Period Expiration: 12/31/2022  Plan of Care Expiration: 8/9/2022  Progress Note Due: 6/9/2022  Visit # / Visits authorized: 1/ 1 5/20  FOTO: 5/5     Time In 10:43  Time Out: 11:30  Total Billable Time: 47 minutes    Precautions: Standard    Subjective     Pt reports:.Pt reports that his neck is hurting after doing some of the exercises. Pt went fishing Saturday and and lower back was a little sore after fishing.     He was compliant with home exercise program.  Response to previous treatment: sore  Functional change: None    Pain: 0/10 every now and then  Location: right back      Objective     Pako received therapeutic exercises to develop strength, endurance, ROM, flexibility, posture and core stabilization for 47 minutes including:      Bike 5 min  Hand heel rocks 20x  Dying Bug 3x10 with R ST ball  Alternating leg kicks with arm pressure 2x10  Abdominal Crunches 30x  Bird dog 2x10  Standing lumbar extension 20x  Side steps Maroon 20ft x 4   Leg Press SL 3x10 3 cords    NP             Hamstring stretch 2 min  SLR with Strap for stretch 15x  Piriformis stretch 1 min each  Unilateral hip flexion stretch 10x each  Paloff press 30x  Pull downs 30x  Leg Press DL 2x10 3 cords  RDL 2x10   Alternating cable rows 30x  Upright row 15x  Cat to Cow 15x  SLR 30x  TA Contraction 10x5s hold  Pelvic tilts  Bridges 2x15 Pelvic tilt   Clams 15x RTB      Pako received the following manual therapy techniques: Joint mobilizations were applied to the: Lumbar for 0 minutes, including:    Lumbar thrust  Hip Mobilization - posterior   trigger  point massage    Home Exercises Provided and Patient Education Provided     Education provided:   - Transverse abdominal contraction throughout the day    Written Home Exercises Provided: yes.  Exercises were reviewed and Pako was able to demonstrate them prior to the end of the session.  Pako demonstrated good  understanding of the education provided.     See EMR under Patient Instructions for exercises provided prior visit.    Assessment     Pt   was able to tolerate bird dog exercises without increase symptoms. Pt had tightness in lower back after laying flat on his back on mat. Pt had decreased lower back pain after performing side steps. Pt is progressing having less pain with movements but still has pain with sit ups from laying supine. Pt needs to continue to increase core strength and stability.     Pako Is progressing well towards his goals.   Pt prognosis is Good.     Pt will continue to benefit from skilled outpatient physical therapy to address the deficits listed in the problem list box on initial evaluation, provide pt/family education and to maximize pt's level of independence in the home and community environment.     Pt's spiritual, cultural and educational needs considered and pt agreeable to plan of care and goals.     Anticipated barriers to physical therapy: None    Goals: Short Term Goals (4 Weeks):  1. Pt will be compliant with HEP to supplement PT in decreasing pain with functional mobility.  2. Pt will perform pallof press with good control to demonstrate improved core strength.  3. Pt will improve impaired LE MMTs by 1/2 score to improve strength for functional tasks.  Long Term Goals (8 Weeks):   1. Pt will improve FOTO score to </= 32% limited to decrease perceived limitation with maintaining/changing body position.   2. Pt will perform Supine to sit with good control to demonstrate improved core strength.  3. Pt will improve impaired LE MMTs by 1 score to improve strength for  functional tasks.  4. Pt will report no pain during lumbar ROM to promote functional mobility.      PLAN   Plan of care Certification: 5/9/2022 to 8/9/2022     .Outpatient Physical Therapy 2 times weekly for 10 weeks to include the following interventions: Cervical/Lumbar Traction, Electrical Stimulation IFC, Tens, Dry needling, Manual Therapy, Moist Heat/ Ice, Neuromuscular Re-ed, Patient Education, Therapeutic Activities and Therapeutic Exercise.       Dario Greenberg, PT

## 2022-06-09 ENCOUNTER — CLINICAL SUPPORT (OUTPATIENT)
Dept: REHABILITATION | Facility: HOSPITAL | Age: 47
End: 2022-06-09
Payer: COMMERCIAL

## 2022-06-09 DIAGNOSIS — R19.8 ABDOMINAL WEAKNESS: Primary | ICD-10-CM

## 2022-06-09 PROCEDURE — 97110 THERAPEUTIC EXERCISES: CPT

## 2022-06-09 PROCEDURE — 97140 MANUAL THERAPY 1/> REGIONS: CPT

## 2022-06-09 NOTE — PROGRESS NOTES
Physical Therapy Daily Treatment Note     Name: Rl Grubbs  [facility] Number: [id number]    Therapy Diagnosis:   Encounter Diagnosis   Name Primary?    Abdominal weakness Yes     Physician: Dayne Puri DO    Visit Date: 6/9/2022       Physician Orders: PT Eval and Treat   Medical Diagnosis from Referral: M47.816 (ICD-10-CM) - Lumbar spondylosis  Evaluation Date: 5/9/2022  Authorization Period Expiration: 12/31/2022  Plan of Care Expiration: 8/9/2022  Progress Note Due: 6/9/2022  Visit # / Visits authorized: 1/ 1 6/20  FOTO: 5/5 0/5    Time In 10:48  Time Out: 11:30  Total Billable Time: 42 minutes    Precautions: Standard    Subjective     Pt reports:.Pt reports  he has felt some back pain when he left. He still has pain with certain movements and when laying on his back.     He was compliant with home exercise program.  Response to previous treatment: sore  Functional change: None    Pain: 1/10 every now and then  Location: right back      Objective     Pako received therapeutic exercises to develop strength, endurance, ROM, flexibility, posture and core stabilization for 27 minutes including:    Hip Abduction 5/5 bilaterally    Hand heel rocks 20x  ST Ball marches 20x  ST Ball d1 flexion 20x  Wall squats 2x15  1/2 kneeling hip flexor stretch 3x30s  Bird dog 2x10  Side steps Maroon 20ft x 4   Paloff press 30x  Pull downs 30x    NP  Dying Bug 3x10 with R ST ball  Alternating leg kicks with arm pressure 2x10  Abdominal Crunches 30x  Standing lumbar extension 20x  Leg Press SL 3x10 3 cords  Bike 5 min           Hamstring stretch 2 min  SLR with Strap for stretch 15x  Piriformis stretch 1 min each  Unilateral hip flexion stretch 10x each    Leg Press DL 2x10 3 cords  RDL 2x10   Alternating cable rows 30x  Upright row 15x  Cat to Cow 15x  SLR 30x  TA Contraction 10x5s hold  Pelvic tilts  Bridges 2x15 Pelvic tilt   Clams 15x RTB      Pako received the following manual therapy techniques: Joint mobilizations  were applied to the: Lumbar for 15 minutes, including:    Lumbar thrust  Lumbar mobilizations with PIVM      Home Exercises Provided and Patient Education Provided     Education provided:   - Transverse abdominal contraction throughout the day    Written Home Exercises Provided: yes.  Exercises were reviewed and Pako was able to demonstrate them prior to the end of the session.  Pako demonstrated good  understanding of the education provided.     See EMR under Patient Instructions for exercises provided prior visit.    Assessment     Pt performed progress reports and have met 3/3 short term goals. Pt was found to hip tight hip flexors on the left. Pt still has pain when laying on his back or stomach after some time. Pt does not have pain when performing standing exercises but does feel fatigue in lower extremities and abdominals. Pt needs to continue to increase core stability.     Pako Is progressing well towards his goals.   Pt prognosis is Good.     Pt will continue to benefit from skilled outpatient physical therapy to address the deficits listed in the problem list box on initial evaluation, provide pt/family education and to maximize pt's level of independence in the home and community environment.     Pt's spiritual, cultural and educational needs considered and pt agreeable to plan of care and goals.     Anticipated barriers to physical therapy: None    Goals: Short Term Goals (4 Weeks):  Met 1. Pt will be compliant with HEP to supplement PT in decreasing pain with functional mobility.  Met 2. Pt will perform pallof press with good control to demonstrate improved core strength.  Met 3. Pt will improve impaired LE MMTs by 1/2 score to improve strength for functional tasks.  Long Term Goals (8 Weeks):   1. Pt will improve FOTO score to </= 32% limited to decrease perceived limitation with maintaining/changing body position.   2. Pt will perform Supine to sit with good control to demonstrate improved core  strength.  3. Pt will improve impaired LE MMTs by 1 score to improve strength for functional tasks.  4. Pt will report no pain during lumbar ROM to promote functional mobility.      PLAN   Plan of care Certification: 5/9/2022 to 8/9/2022     .Outpatient Physical Therapy 2 times weekly for 10 weeks to include the following interventions: Cervical/Lumbar Traction, Electrical Stimulation IFC, Tens, Dry needling, Manual Therapy, Moist Heat/ Ice, Neuromuscular Re-ed, Patient Education, Therapeutic Activities and Therapeutic Exercise.       Dario Greenberg, PT

## 2022-06-11 NOTE — TELEPHONE ENCOUNTER
No new care gaps identified.  [facility] Embedded Care Gaps. Reference number: [id number]. 6/11/2022   5:25:01 AM HERBT

## 2022-06-11 NOTE — TELEPHONE ENCOUNTER
Refill Routing Note   Medication(s) are not appropriate for processing by [facility] for the following reason(s):      - Required laboratory values are outdated    [facility] action(s):  Defer Medication-related problems identified: Requires labs        Medication reconciliation completed: No     Appointments  past 12m or future 3m with PCP    Date Provider   Last Visit   3/17/2022 Tomer Roberto MD   Next Visit   9/22/2022 Tomer Roberto MD   ED visits in past 90 days: 0        Note composed:6:38 PM 06/11/2022

## 2022-06-13 RX ORDER — IRBESARTAN 300 MG/1
TABLET ORAL
Qty: 90 TABLET | Refills: 3 | Status: SHIPPED | OUTPATIENT
Start: 2022-06-13 | End: 2023-06-06

## 2022-06-14 ENCOUNTER — OFFICE VISIT (OUTPATIENT)
Dept: SPORTS MEDICINE | Facility: CLINIC | Age: 47
End: 2022-06-14
Payer: COMMERCIAL

## 2022-06-14 ENCOUNTER — CLINICAL SUPPORT (OUTPATIENT)
Dept: REHABILITATION | Facility: HOSPITAL | Age: 47
End: 2022-06-14
Payer: COMMERCIAL

## 2022-06-14 VITALS
SYSTOLIC BLOOD PRESSURE: 151 MMHG | DIASTOLIC BLOOD PRESSURE: 80 MMHG | BODY MASS INDEX: 32.75 KG/M2 | WEIGHT: 233.94 LBS | HEIGHT: 71 IN

## 2022-06-14 DIAGNOSIS — M99.03 SOMATIC DYSFUNCTION OF LUMBAR REGION: ICD-10-CM

## 2022-06-14 DIAGNOSIS — M99.04 SOMATIC DYSFUNCTION OF SACRAL REGION: ICD-10-CM

## 2022-06-14 DIAGNOSIS — M54.59 MECHANICAL LOW BACK PAIN: ICD-10-CM

## 2022-06-14 DIAGNOSIS — M21.70 LEG LENGTH DISCREPANCY: ICD-10-CM

## 2022-06-14 DIAGNOSIS — M99.05 SOMATIC DYSFUNCTION OF PELVIS REGION: ICD-10-CM

## 2022-06-14 DIAGNOSIS — M47.816 LUMBAR SPONDYLOSIS: Primary | ICD-10-CM

## 2022-06-14 DIAGNOSIS — R19.8 ABDOMINAL WEAKNESS: Primary | ICD-10-CM

## 2022-06-14 PROCEDURE — 99214 OFFICE O/P EST MOD 30 MIN: CPT | Mod: 25,S$GLB,, | Performed by: ORTHOPAEDIC SURGERY

## 2022-06-14 PROCEDURE — 3077F PR MOST RECENT SYSTOLIC BLOOD PRESSURE >= 140 MM HG: ICD-10-PCS | Mod: CPTII,S$GLB,, | Performed by: ORTHOPAEDIC SURGERY

## 2022-06-14 PROCEDURE — 98926 PR OSTEOPATHIC MANIP,3-4 BODY REGN: ICD-10-PCS | Mod: S$GLB,,, | Performed by: ORTHOPAEDIC SURGERY

## 2022-06-14 PROCEDURE — 99999 PR PBB SHADOW E&M-EST. PATIENT-LVL III: CPT | Mod: PBBFAC,,, | Performed by: ORTHOPAEDIC SURGERY

## 2022-06-14 PROCEDURE — 99999 PR PBB SHADOW E&M-EST. PATIENT-LVL III: ICD-10-PCS | Mod: PBBFAC,,, | Performed by: ORTHOPAEDIC SURGERY

## 2022-06-14 PROCEDURE — 97110 THERAPEUTIC EXERCISES: CPT

## 2022-06-14 PROCEDURE — 3008F BODY MASS INDEX DOCD: CPT | Mod: CPTII,S$GLB,, | Performed by: ORTHOPAEDIC SURGERY

## 2022-06-14 PROCEDURE — 97140 MANUAL THERAPY 1/> REGIONS: CPT

## 2022-06-14 PROCEDURE — 3008F PR BODY MASS INDEX (BMI) DOCUMENTED: ICD-10-PCS | Mod: CPTII,S$GLB,, | Performed by: ORTHOPAEDIC SURGERY

## 2022-06-14 PROCEDURE — 98926 OSTEOPATH MANJ 3-4 REGIONS: CPT | Mod: S$GLB,,, | Performed by: ORTHOPAEDIC SURGERY

## 2022-06-14 PROCEDURE — 3077F SYST BP >= 140 MM HG: CPT | Mod: CPTII,S$GLB,, | Performed by: ORTHOPAEDIC SURGERY

## 2022-06-14 PROCEDURE — 1160F RVW MEDS BY RX/DR IN RCRD: CPT | Mod: CPTII,S$GLB,, | Performed by: ORTHOPAEDIC SURGERY

## 2022-06-14 PROCEDURE — 99214 PR OFFICE/OUTPT VISIT, EST, LEVL IV, 30-39 MIN: ICD-10-PCS | Mod: 25,S$GLB,, | Performed by: ORTHOPAEDIC SURGERY

## 2022-06-14 PROCEDURE — 1160F PR REVIEW ALL MEDS BY PRESCRIBER/CLIN PHARMACIST DOCUMENTED: ICD-10-PCS | Mod: CPTII,S$GLB,, | Performed by: ORTHOPAEDIC SURGERY

## 2022-06-14 PROCEDURE — 4010F ACE/ARB THERAPY RXD/TAKEN: CPT | Mod: CPTII,S$GLB,, | Performed by: ORTHOPAEDIC SURGERY

## 2022-06-14 PROCEDURE — 3079F DIAST BP 80-89 MM HG: CPT | Mod: CPTII,S$GLB,, | Performed by: ORTHOPAEDIC SURGERY

## 2022-06-14 PROCEDURE — 1159F PR MEDICATION LIST DOCUMENTED IN MEDICAL RECORD: ICD-10-PCS | Mod: CPTII,S$GLB,, | Performed by: ORTHOPAEDIC SURGERY

## 2022-06-14 PROCEDURE — 4010F PR ACE/ARB THEARPY RXD/TAKEN: ICD-10-PCS | Mod: CPTII,S$GLB,, | Performed by: ORTHOPAEDIC SURGERY

## 2022-06-14 PROCEDURE — 1159F MED LIST DOCD IN RCRD: CPT | Mod: CPTII,S$GLB,, | Performed by: ORTHOPAEDIC SURGERY

## 2022-06-14 PROCEDURE — 3079F PR MOST RECENT DIASTOLIC BLOOD PRESSURE 80-89 MM HG: ICD-10-PCS | Mod: CPTII,S$GLB,, | Performed by: ORTHOPAEDIC SURGERY

## 2022-06-14 RX ORDER — IBUPROFEN 800 MG/1
800 TABLET ORAL EVERY 6 HOURS PRN
Qty: 60 TABLET | Refills: 0 | Status: SHIPPED | OUTPATIENT
Start: 2022-06-14 | End: 2022-09-22

## 2022-06-14 NOTE — PROGRESS NOTES
CC: low back pain    Pako is here today for follow up evaluation of his low back pain. Patient reports his pain is 0/10 today. He has been attending physical therapy twice per week and has been compliant with his HEP. He feels as though this has been helpful, but he continues to appreciate right sided low back pain that is increased the day after exertion. He denies new falls or trauma since his last visit and denies radicular symptoms.     Recall from visit on 04/05/2022  Pako is here today for follow up evaluation of his low back pain. Patient reports his pain is 0/10 today and feels as though he is improved since his last visit. He is here today to discuss his MRI results and next steps. He presents to clinic today with his MRI disc. He denies new falls or trauma since his last visit.     Recall from visit on 03/08/2022  Pako is here today for follow up evaluation of his low back pain. Patient reports his pain is 1/10 today. He appreciated an increase his pain 02/26/2022 which he attributes to helping a friend around his house. He notes radiating pain down his right leg during this exacerbation. He states his pain has since improved. He has not been compliant with his HEP and has discontinued his shoe lift due to concern regarding his most recent increase in pain.        REVIEW OF SYSTEMS:   Constitution: Patient denies fever, chills, night sweats, and weight changes.  Eyes: Patient denies eye pain or vision changes.  HENT: Patient denies headache, ear pain, sore throat, or nasal discharge.  CVS: Patient denies chest pain.  Lungs: Patient denies shortness of breath or cough.  Abd: Patient denies stomach pain, nausea, or vomiting.  Skin: Patient denies skin rash or itching.    Hematologic/Lymphatic: Patient denies easy bruising.   Musculoskeletal: Patient denies recent falls. See HPI.  Psych: Patient denies any current anxiety or nervousness.    PAST MEDICAL HISTORY:   History reviewed. No pertinent past  medical history.    PAST SURGICAL HISTORY:   Past Surgical History:   Procedure Laterality Date    CHOLECYSTECTOMY  7-28-21    HERNIA REPAIR  7-    ROBOT-ASSISTED CHOLECYSTECTOMY USING DA LORENE XI N/A 07/28/2021    Procedure: XI ROBOTIC CHOLECYSTECTOMY;  Surgeon: Brett Gastelum MD;  Location: [address];  Service: General;  Laterality: N/A;    ROBOT-ASSISTED LAPAROSCOPIC REPAIR OF EPIGASTRIC HERNIA N/A 07/28/2021    Procedure: ROBOTIC REPAIR, HERNIA, EPIGASTRIC;  Surgeon: Brett Gsatelum MD;  Location: [facility] OR [address] [place] [address];  Service: General;  Laterality: N/A;  Incarcerated visceral hernia repair         FAMILY HISTORY:   Family History   Problem Relation Age of Onset    Asthma Mother     COPD Mother     Hearing loss Mother     Heart disease Mother     Hypertension Mother     Kidney disease Mother     Cancer Father     Heart disease Brother     Hypertension Brother     Diabetes Brother        SOCIAL HISTORY:   Social History     Socioeconomic History    Marital status:    Tobacco Use    Smoking status: Never Smoker    Smokeless tobacco: Never Used   Substance and Sexual Activity    Alcohol use: Yes     Alcohol/week: 6.0 standard drinks     Types: 6 Cans of beer per week     Comment: Socially    Drug use: Never    Sexual activity: Yes     Partners: Female     Birth control/protection: None     Social Determinants of Health     Financial Resource Strain: Low Risk     Difficulty of Paying Living Expenses: Not hard at all   Food Insecurity: No Food Insecurity    Worried About Running Out of Food in the Last Year: Never true    Ran Out of Food in the Last Year: Never true   Transportation Needs: No Transportation Needs    Lack of Transportation (Medical): No    Lack of Transportation (Non-Medical): No   Physical Activity: Insufficiently Active    Days of Exercise per Week: 2 days    Minutes of Exercise per Session: 60 min   Stress: No Stress Concern Present    Feeling of  "Stress : Only a little   Social Connections: Unknown    Frequency of Communication with Friends and Family: Once a week    Frequency of Social Gatherings with Friends and Family: Once a week    Active Member of Clubs or Organizations: No    Attends Club or Organization Meetings: Patient refused    Marital Status:    Housing Stability: Low Risk     Unable to Pay for Housing in the Last Year: No    Number of Places Lived in the Last Year: 1    Unstable Housing in the Last Year: No       MEDICATIONS:     Current Outpatient Medications:     buPROPion (WELLBUTRIN XL) 300 MG 24 hr tablet, Take 1 tablet (300 mg total) by mouth once daily., Disp: 90 tablet, Rfl: 3    cetirizine (ZYRTEC) 10 MG tablet, Take 10 mg by mouth once daily., Disp: , Rfl:     esomeprazole (NEXIUM) 40 MG capsule, TAKE 1 CAPSULE(40 MG) BY MOUTH EVERY DAY, Disp: 90 capsule, Rfl: 3    fenofibrate 160 MG Tab, Take 1 tablet (160 mg total) by mouth once daily., Disp: 90 tablet, Rfl: 3    irbesartan (AVAPRO) 300 MG tablet, TAKE 1 TABLET(300 MG) BY MOUTH EVERY DAY, Disp: 90 tablet, Rfl: 3    meloxicam (MOBIC) 15 MG tablet, Take 1 tablet (15 mg total) by mouth once daily., Disp: 30 tablet, Rfl: 0    docusate sodium (COLACE) 100 MG capsule, Take 1 capsule (100 mg total) by mouth 2 (two) times daily., Disp: 60 capsule, Rfl: 0    ibuprofen (ADVIL,MOTRIN) 800 MG tablet, Take 1 tablet (800 mg total) by mouth every 6 (six) hours as needed for Pain., Disp: 60 tablet, Rfl: 0    omega 3-dha-epa-fish oil 1,000 mg (120 mg-180 mg) Cap, Take 1,000 mg by mouth once daily., Disp: , Rfl:     ALLERGIES:   Review of patient's allergies indicates:  No Known Allergies     PHYSICAL EXAMINATION:  BP (!) 151/80   Ht 5' 11" (1.803 m)   Wt 106.1 kg (233 lb 14.5 oz)   BMI 32.62 kg/m²   Vitals signs and nursing note have been reviewed.  General: In no acute distress, well developed, well nourished, no diaphoresis  Eyes: EOM full and smooth, no eye redness or " discharge  HENT: normocephalic and atraumatic, neck supple, trachea midline, no nasal discharge, no external ear redness or discharge  Cardiovascular: no LE edema  Lungs: respirations non-labored, no conversational dyspnea   Abd: non-distended, no rigidity  MSK: no amputation or deformity, no swelling of extremities  Neuro: AAOx3, CN2-12 grossly intact  Skin: No rashes, warm and dry  Psychiatric: cooperative, pleasant, mood and affect appropriate for age    Lumbar Spine: right lumbar region    Observation:    Normal cervicothoracolumbar curves.    No obvious pelvic obliquity while standing.    No edema, erythema, or ecchymosis noted in lumbosacral region.    No midline skin abnormalities.    No atrophy of lower limb musculature.    Tenderness:  + tenderness throughout the right lower lumbar spine  No tenderness over the right sacral base   No tenderness over the piriformis, greater/lesser trochanters.  No bony deformities or step-offs palpated.     Range of Motion:  Active flexion to 60°.   Active extension to 25°.   Active rotation to 30° on left and 30° on right.    Active sidebending to 25° on left and 25° on right.  Passive hip flexion to 135° on left and 135° on right.    Passive hip internal rotation to 45° on left and 45° on right.    Passive hip external rotation to 45° on left and 45° on right.      Strength Testing:  Hip flexion - 5/5 on left and 5/5 on right  Hip extension - 5/5 on left and 5/5 on right  Knee flexion - 5/5 on left and 5/5 on right  Knee extension - 5/5 on left and 5/5 on right  Dorsiflexion - 5/5 on left and 5/5 on right  Plantarflexion - 5/5 on left and 5/5 on right  Great toe extension - 5/5 on left and 5/5 on right    Special Tests:  Standing Nava's test - negative on left and negative on right  Stork test - negative on left and negative on right    Seated straight leg raise - negative on left and negative on right  Supine straight leg raise - negative on left and negative on  right  Slump test - negative on left and negative on right  Provocation maneuvers exhibit no worsening of symptoms on left or on right.    LINUS test - negative  FADIR test - negative  Log roll test - negative    Neurovascular Exam:  Sensation intact to light touch in the L2-S2 dermatomes bilaterally.   No pretibial edema or abnormal hair pattern of the shin.    Intact and symmetric DP and PT pulses bilaterally.  Normal gait without trendelenberg, heel walking, toe walking, and tandem walking.    Posture:  Upright and Anterior pelvic tilt with increased lumbar lordosis  Gait: Non-antalgic with Neutral ankle mechanics     TART (Tissue texture abnormality, Asymmetry,  Restriction of motion and/or Tenderness) changes:    Head:     Cervical Spine  Thoracic Spine  Lumbar Spine   C1  T1  L1 Neutral   C2  T2  L2 Neutral   C3  T3 Neutral L3 Neutral   C4  T4 Neutral L4 ERSR   C5  T5 Neutral L5 FRSL   C6  T6 Neutral     C7  T7 Neutral       T8 Neutral       T9 Neutral       T10 Neutral       T11 Neutral       T12 Neutral       Ribs:    Upper Extremity:    Abdomen:    Pelvis:  · Innominate:Right superior shear  · Pubic bone:Neutral     Sacrum:Right on Right sacral torsion     Lower Extremity:      Key   F= Flexed   E = Extended   R = Rotated   N = Neutral   S = Sidebent   TTA = tissue texture abnormality   L/R/B = left/right/bilateral (last letter)       IMAGIN. X-ray obtained 02/15/2022 due to low back pain   2. X-ray images were reviewed personally by me and then directly with patient.  3. FINDINGS: X-ray images obtained demonstrate chronic degenerative change of the lumbar spine, wedge deformity on T12, disc space narrowed most pronounced at L3-4, osteoarthritic changes of the lower lumbar facet joints  4. IMPRESSION: As above.     1. MRI obtained 2022 due to low back pain  2. MRI images were reviewed personally by me and then directly with patient.  3. FINDINGS: MRI images obtained demonstrate broad based dorsal  disc protrusion with mild thecal sac compression at L1.  Degenerative disc disease most noticeable at the L4 disc.  Mild degenerative changes of the lower lumbar spine.  No significant spinal or foraminal stenosis.  4. IMPRESSION: As above.       ASSESSMENT:      ICD-10-CM ICD-9-CM   1. Lumbar spondylosis  M47.816 721.3   2. Leg length discrepancy  M21.70 736.81   3. Mechanical low back pain  M54.59 724.2   4. Somatic dysfunction of lumbar region  M99.03 739.3   5. Somatic dysfunction of sacral region  M99.04 739.4   6. Somatic dysfunction of pelvis region  M99.05 739.5         PLAN:  1-2.  Mechanical low back pain -   3. Lumbar spondylosis/leg-length discrepancy -     - Pako admits to right low back pain for the past two years without red-flag symptoms that increases with getting out of bed and in proportion to his activity level.     - He has been compliant with physical therapy twice per week in addition to his HEP and feels as though he is improving, albeit slowly.     - Based on his description of pain/body language and somatic dysfunction identified on exam, I discussed osteopathic manipulation as a treatment option today. He consents to evaluation and treatment. See below.    - HEP for abdominal bracing, ant marches, pelvic clocks 6-12, diaphragm breathing prescribed at initial visit.  Continue with these daily.     - Continue with [facility] physical therapy and HEP from physical therapy.  He seems to be on pace and I am optimistic that he will continue to find improvement as he gains more core strength.    - Ibuprofen 800 mg refilled as this has been more effective for him than Mobic.      4-6.  Somatic dysfunction of lumbar, pelvis, sacral regions -     - OMT 3-4 regions. Oral consent obtained. Reviewed benefits and potential side effects. OMT indicated today due to signs and symptoms as well as local and remote somatic dysfunction findings and their related neurokinetic, lymphatic, fascial and/or  arteriovenous body connections. OMT techniques used: Soft Tissue, Myofascial Release, Muscle Energy and High Velocity Low Amplitude. Treatment was tolerated well. Improvement noted in segmental mobility post-treatment in dysfunctional regions. There were no adverse events and no complications immediately following treatment. Advised plenty of water to help alleviate soreness.      Future planning includes - possibly more OMT if helpful and if indicated    All questions were answered to the best of my ability and all concerns were addressed at this time.    Follow up as needed.       This note is dictated using the M*Modal Fluency Direct word recognition program. There are word recognition mistakes that are occasionally missed on review.      Total time spent with patient: see X landon on chart below.   More than half of the time was spent counseling or coordinating care including prognosis, differential diagnosis, risks and benefits of treatment, instructions, compliance risk reductions.    EST MINUTES X   [id number] 5    [id number] 10    [id number] 15    [id number] 25 X   [id number] 40    NEW     [id number] 10    49613 20    49909 30    39977 45    [id number] 60    PHONE      5-10    [id number] 11-20    42480 21-30

## 2022-06-14 NOTE — PROGRESS NOTES
Physical Therapy Daily Treatment Note     Name: Rl Grubbs  Clinic Number: [id number]    Therapy Diagnosis:   Encounter Diagnosis   Name Primary?    Abdominal weakness Yes     Physician: Dayne Puri DO    Visit Date: 6/14/2022       Physician Orders: PT Eval and Treat   Medical Diagnosis from Referral: M47.816 (ICD-10-CM) - Lumbar spondylosis  Evaluation Date: 5/9/2022  Authorization Period Expiration: 12/31/2022  Plan of Care Expiration: 8/9/2022  Progress Note Due: 6/9/2022  Visit # / Visits authorized: 1/ 1 6/20  FOTO: 5/5 1/5    Time In 12:55  Time Out: 1:50  Total Billable Time: 55 minutes    Precautions: Standard    Subjective     Pt reports:.Pt reports that he saw physician for follow up and has been making progress. Pt reports he had pain Sunday after going yard work. Pt is afraid to perform full swing of gold club    He was compliant with home exercise program.  Response to previous treatment: sore  Functional change: None    Pain: 1/10 every now and then  Location: right back      Objective     Pako received therapeutic exercises to develop strength, endurance, ROM, flexibility, posture and core stabilization for 40 minutes including:    Hand heel rocks 20x  Paloff press 30x  Pull downs 30x  Lunges 2x10  Lunge up lifts 2x15  Side steps Maroon 20ft x 4   Kneeling cable crunch 20x  Abdominal crunches 15x  Lateral shifts 10x  Lat stretch 2x30  Standing SLR 2x10    NP  ST Ball marches 20x  ST Ball d1 flexion 20x  Wall squats 2x15  1/2 kneeling hip flexor stretch 3x30s  Bird dog 2x10  Dying Bug 3x10 with R ST ball  Alternating leg kicks with arm pressure 2x10  Abdominal Crunches 30x  Standing lumbar extension 20x  Leg Press SL 3x10 3 cords  Bike 5 min  Hamstring stretch 2 min  SLR with Strap for stretch 15x  Piriformis stretch 1 min each  Unilateral hip flexion stretch 10x each  Leg Press DL 2x10 3 cords  RDL 2x10   Alternating cable rows 30x  Upright row 15x  Cat to Cow 15x  SLR 30x  TA  Contraction 10x5s hold  Pelvic tilts  Bridges 2x15 Pelvic tilt   Clams 15x RTB      Pako received the following manual therapy techniques: Joint mobilizations were applied to the: Lumbar for 15 minutes, including:    Soft tissue massage to lumbar    Home Exercises Provided and Patient Education Provided     Education provided:   - Transverse abdominal contraction throughout the day    Written Home Exercises Provided: yes.  Exercises were reviewed and Pako was able to demonstrate them prior to the end of the session.  Pako demonstrated good  understanding of the education provided.     See EMR under Patient Instructions for exercises provided prior visit.    Assessment     Pt had increase pain when attempting abdominal crunch in supine but not when performing crunch in kneeling. Pt had no change in pain with soft tissue massage. Pt has muscle fatigue in core and lower extremities with exercises but no pain. Pt's pain arises quickly when placed in supine or prone position. Pt's function is improving but still has pain with getting up from laying down. Pt was educated on the needs to increase general exercise while receiving physical therapy.     Pako Is progressing well towards his goals.   Pt prognosis is Good.     Pt will continue to benefit from skilled outpatient physical therapy to address the deficits listed in the problem list box on initial evaluation, provide pt/family education and to maximize pt's level of independence in the home and community environment.     Pt's spiritual, cultural and educational needs considered and pt agreeable to plan of care and goals.     Anticipated barriers to physical therapy: None    Goals: Short Term Goals (4 Weeks):  Met 1. Pt will be compliant with HEP to supplement PT in decreasing pain with functional mobility.  Met 2. Pt will perform pallof press with good control to demonstrate improved core strength.  Met 3. Pt will improve impaired LE MMTs by 1/2 score to improve  strength for functional tasks.  Long Term Goals (8 Weeks):   1. Pt will improve FOTO score to </= 32% limited to decrease perceived limitation with maintaining/changing body position.   2. Pt will perform Supine to sit with good control to demonstrate improved core strength.  3. Pt will improve impaired LE MMTs by 1 score to improve strength for functional tasks.  4. Pt will report no pain during lumbar ROM to promote functional mobility.      PLAN   Plan of care Certification: 5/9/2022 to 8/9/2022     .Outpatient Physical Therapy 2 times weekly for 10 weeks to include the following interventions: Cervical/Lumbar Traction, Electrical Stimulation IFC, Tens, Dry needling, Manual Therapy, Moist Heat/ Ice, Neuromuscular Re-ed, Patient Education, Therapeutic Activities and Therapeutic Exercise.       Dario Greenberg, PT

## 2022-06-28 ENCOUNTER — CLINICAL SUPPORT (OUTPATIENT)
Dept: REHABILITATION | Facility: HOSPITAL | Age: 47
End: 2022-06-28
Payer: COMMERCIAL

## 2022-06-28 DIAGNOSIS — R19.8 ABDOMINAL WEAKNESS: Primary | ICD-10-CM

## 2022-06-28 PROCEDURE — 97110 THERAPEUTIC EXERCISES: CPT

## 2022-06-28 NOTE — PROGRESS NOTES
Physical Therapy Daily Treatment Note     Name: Rl Grubbs  Clinic Number: [id number]    Therapy Diagnosis:   Encounter Diagnosis   Name Primary?    Abdominal weakness Yes     Physician: aDyne Puri DO    Visit Date: 6/28/2022       Physician Orders: PT Eval and Treat   Medical Diagnosis from Referral: M47.816 (ICD-10-CM) - Lumbar spondylosis  Evaluation Date: 5/9/2022  Authorization Period Expiration: 12/31/2022  Plan of Care Expiration: 8/9/2022  Progress Note Due: 6/9/2022  Visit # / Visits authorized: 1/ 1 7/20  FOTO: 5/5 2/5    Time In 12:58  Time Out: 1:28  Total Billable Time: 45 minutes    Precautions: Standard    Subjective     Pt reports:.Pt reports Sunday night he worked out at his house. He was sore the next day but that he was sore the next day. He did 30 minutes of cardio. Pt no longer complains of pain in his lower back when getting up. Pt still has difficulty getting up from a bench or flat surface    He was compliant with home exercise program.  Response to previous treatment: sore  Functional change: None    Pain: 1/10 every now and then  Location: right back      Objective     Pako received therapeutic exercises to develop strength, endurance, ROM, flexibility, posture and core stabilization for 45 minutes including:    Open Books 10x  Paloff press 30x  Pull downs 30x  Cable chops high and low 2x15  Kneeling cable crunch 3x20 20#  Abdominal crunches on ST 3x15      NP  Hand heel rocks 20x  ST Ball marches 20x  ST Ball d1 flexion 20x  Wall squats 2x15  1/2 kneeling hip flexor stretch 3x30s  Bird dog 2x10  Dying Bug 3x10 with R ST ball  Alternating leg kicks with arm pressure 2x10  Abdominal Crunches 30x  Standing lumbar extension 20x  Leg Press SL 3x10 3 cords  Bike 5 min  Hamstring stretch 2 min  SLR with Strap for stretch 15x  Piriformis stretch 1 min each  Unilateral hip flexion stretch 10x each  Leg Press DL 2x10 3 cords  RDL 2x10   Alternating cable rows 30x  Upright row 15x  Cat  to Cow 15x  SLR 30x  TA Contraction 10x5s hold  Pelvic tilts  Bridges 2x15 Pelvic tilt   Clams 15x RTB      Pako received the following manual therapy techniques: Joint mobilizations were applied to the: Lumbar for 0 minutes, including:    Soft tissue massage to lumbar    Home Exercises Provided and Patient Education Provided     Education provided:   - Transverse abdominal contraction throughout the day    Written Home Exercises Provided: yes.  Exercises were reviewed and Pako was able to demonstrate them prior to the end of the session.  Pako demonstrated good  understanding of the education provided.     See EMR under Patient Instructions for exercises provided prior visit.    Assessment     Pt was able to tolerate all exercises with lower back pain. Pt is progressing and able to tolerate more activities without pain. Pt will begin coming to therapy once a week next week and increase at home activity. Pt still has issues with performing full sit up.    Pako Is progressing well towards his goals.   Pt prognosis is Good.     Pt will continue to benefit from skilled outpatient physical therapy to address the deficits listed in the problem list box on initial evaluation, provide pt/family education and to maximize pt's level of independence in the home and community environment.     Pt's spiritual, cultural and educational needs considered and pt agreeable to plan of care and goals.     Anticipated barriers to physical therapy: None    Goals: Short Term Goals (4 Weeks):  Met 1. Pt will be compliant with HEP to supplement PT in decreasing pain with functional mobility.  Met 2. Pt will perform pallof press with good control to demonstrate improved core strength.  Met 3. Pt will improve impaired LE MMTs by 1/2 score to improve strength for functional tasks.  Long Term Goals (8 Weeks):   1. Pt will improve FOTO score to </= 32% limited to decrease perceived limitation with maintaining/changing body position.   2. Pt  will perform Supine to sit with good control to demonstrate improved core strength.  3. Pt will improve impaired LE MMTs by 1 score to improve strength for functional tasks.  4. Pt will report no pain during lumbar ROM to promote functional mobility.      PLAN   Plan of care Certification: 5/9/2022 to 8/9/2022     .Outpatient Physical Therapy 2 times weekly for 10 weeks to include the following interventions: Cervical/Lumbar Traction, Electrical Stimulation IFC, Tens, Dry needling, Manual Therapy, Moist Heat/ Ice, Neuromuscular Re-ed, Patient Education, Therapeutic Activities and Therapeutic Exercise.       Dario Greenberg, PT

## 2022-07-29 ENCOUNTER — PATIENT MESSAGE (OUTPATIENT)
Dept: SPORTS MEDICINE | Facility: CLINIC | Age: 47
End: 2022-07-29
Payer: COMMERCIAL

## 2022-08-04 ENCOUNTER — OFFICE VISIT (OUTPATIENT)
Dept: DERMATOLOGY | Facility: CLINIC | Age: 47
End: 2022-08-04
Payer: COMMERCIAL

## 2022-08-04 DIAGNOSIS — D22.5 MELANOCYTIC NEVI OF TRUNK: ICD-10-CM

## 2022-08-04 DIAGNOSIS — L82.1 SEBORRHEIC KERATOSIS: ICD-10-CM

## 2022-08-04 DIAGNOSIS — L81.4 LENTIGINES: ICD-10-CM

## 2022-08-04 DIAGNOSIS — D18.01 ANGIOMA OF SKIN: ICD-10-CM

## 2022-08-04 DIAGNOSIS — Z12.83 SKIN CANCER SCREENING: ICD-10-CM

## 2022-08-04 DIAGNOSIS — L57.0 ACTINIC KERATOSIS: Primary | ICD-10-CM

## 2022-08-04 DIAGNOSIS — L81.0 POSTINFLAMMATORY HYPERPIGMENTATION: ICD-10-CM

## 2022-08-04 PROCEDURE — 4010F ACE/ARB THERAPY RXD/TAKEN: CPT | Mod: CPTII,S$GLB,, | Performed by: DERMATOLOGY

## 2022-08-04 PROCEDURE — 1160F RVW MEDS BY RX/DR IN RCRD: CPT | Mod: CPTII,S$GLB,, | Performed by: DERMATOLOGY

## 2022-08-04 PROCEDURE — 99213 PR OFFICE/OUTPT VISIT, EST, LEVL III, 20-29 MIN: ICD-10-PCS | Mod: 25,S$GLB,, | Performed by: DERMATOLOGY

## 2022-08-04 PROCEDURE — 1159F PR MEDICATION LIST DOCUMENTED IN MEDICAL RECORD: ICD-10-PCS | Mod: CPTII,S$GLB,, | Performed by: DERMATOLOGY

## 2022-08-04 PROCEDURE — 4010F PR ACE/ARB THEARPY RXD/TAKEN: ICD-10-PCS | Mod: CPTII,S$GLB,, | Performed by: DERMATOLOGY

## 2022-08-04 PROCEDURE — 99213 OFFICE O/P EST LOW 20 MIN: CPT | Mod: 25,S$GLB,, | Performed by: DERMATOLOGY

## 2022-08-04 PROCEDURE — 1160F PR REVIEW ALL MEDS BY PRESCRIBER/CLIN PHARMACIST DOCUMENTED: ICD-10-PCS | Mod: CPTII,S$GLB,, | Performed by: DERMATOLOGY

## 2022-08-04 PROCEDURE — 1159F MED LIST DOCD IN RCRD: CPT | Mod: CPTII,S$GLB,, | Performed by: DERMATOLOGY

## 2022-08-04 PROCEDURE — 17000 DESTRUCT PREMALG LESION: CPT | Mod: S$GLB,,, | Performed by: DERMATOLOGY

## 2022-08-04 PROCEDURE — 17000 PR DESTRUCTION(LASER SURGERY,CRYOSURGERY,CHEMOSURGERY),PREMALIGNANT LESIONS,FIRST LESION: ICD-10-PCS | Mod: S$GLB,,, | Performed by: DERMATOLOGY

## 2022-08-04 NOTE — PATIENT INSTRUCTIONS
How to perform a skin self-exam:  [email or web address]       CRYOSURGERY      Your doctor has used a method called cryosurgery to treat your skin condition. Cryosurgery refers to the use of very cold substances to treat a variety of skin conditions such as warts, precancerous skin lesions, molluscum contagiosum, sun spots, and several benign growths. The substance we use in cryosurgery is liquid nitrogen and is so cold (-195 degrees Celsius) that it burns when administered.     Following treatment in the office, the skin may immediately itch or burn and become red. You may find the area around the lesion is affected as well. It is sometimes necessary to treat not only the lesion, but also a small area of the surrounding normal skin to achieve a good response.     A blister, and even a blood-filled blister, may form after treatment.   This is a normal response. If the blister is painful, it is acceptable to sterilize a needle with rubbing alcohol and gently pop the blister. It is important that you gently wash the area with soap and warm water as the blister fluid may contain wart virus if a wart was treated. Do not remove the roof of the blister.     The area treated can take anywhere from 1-3 weeks to heal. Healing time depends on the kind of skin lesion treated, the location, and how aggressively the lesion was treated. It is recommended that the areas treated are covered with Vaseline and a bandaid to improve healing. If a bandaid is not practical, Vaseline applied several times per day will do. Keeping these areas moist will speed the healing time.    Treatment with liquid nitrogen can leave a scar. In dark skin, it may be a light or dark scar; in light skin it may be a white or pink scar. These will generally fade with time, but may never go away completely.     If you have any concerns after your treatment, please message us via [facility] or call us at [phone].  [Patient Intake Form Reviewed] : Patient intake form was reviewed
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
## 2022-09-22 ENCOUNTER — OFFICE VISIT (OUTPATIENT)
Dept: INTERNAL MEDICINE | Facility: CLINIC | Age: 47
End: 2022-09-22
Payer: COMMERCIAL

## 2022-09-22 VITALS
OXYGEN SATURATION: 98 % | HEIGHT: 71 IN | HEART RATE: 96 BPM | SYSTOLIC BLOOD PRESSURE: 120 MMHG | WEIGHT: 236.75 LBS | DIASTOLIC BLOOD PRESSURE: 80 MMHG | BODY MASS INDEX: 33.15 KG/M2

## 2022-09-22 DIAGNOSIS — F33.0 DEPRESSION, MAJOR, RECURRENT, MILD: ICD-10-CM

## 2022-09-22 DIAGNOSIS — I10 BENIGN ESSENTIAL HYPERTENSION: Primary | ICD-10-CM

## 2022-09-22 DIAGNOSIS — Z12.5 PROSTATE CANCER SCREENING: ICD-10-CM

## 2022-09-22 DIAGNOSIS — G47.33 OSA (OBSTRUCTIVE SLEEP APNEA): ICD-10-CM

## 2022-09-22 DIAGNOSIS — E78.2 MIXED HYPERLIPIDEMIA: ICD-10-CM

## 2022-09-22 PROCEDURE — 99214 PR OFFICE/OUTPT VISIT, EST, LEVL IV, 30-39 MIN: ICD-10-PCS | Mod: S$GLB,,, | Performed by: INTERNAL MEDICINE

## 2022-09-22 PROCEDURE — 99214 OFFICE O/P EST MOD 30 MIN: CPT | Mod: S$GLB,,, | Performed by: INTERNAL MEDICINE

## 2022-09-22 PROCEDURE — 4010F ACE/ARB THERAPY RXD/TAKEN: CPT | Mod: CPTII,S$GLB,, | Performed by: INTERNAL MEDICINE

## 2022-09-22 PROCEDURE — 1160F PR REVIEW ALL MEDS BY PRESCRIBER/CLIN PHARMACIST DOCUMENTED: ICD-10-PCS | Mod: CPTII,S$GLB,, | Performed by: INTERNAL MEDICINE

## 2022-09-22 PROCEDURE — 99999 PR PBB SHADOW E&M-EST. PATIENT-LVL III: CPT | Mod: PBBFAC,,, | Performed by: INTERNAL MEDICINE

## 2022-09-22 PROCEDURE — 3074F PR MOST RECENT SYSTOLIC BLOOD PRESSURE < 130 MM HG: ICD-10-PCS | Mod: CPTII,S$GLB,, | Performed by: INTERNAL MEDICINE

## 2022-09-22 PROCEDURE — 1160F RVW MEDS BY RX/DR IN RCRD: CPT | Mod: CPTII,S$GLB,, | Performed by: INTERNAL MEDICINE

## 2022-09-22 PROCEDURE — 1159F PR MEDICATION LIST DOCUMENTED IN MEDICAL RECORD: ICD-10-PCS | Mod: CPTII,S$GLB,, | Performed by: INTERNAL MEDICINE

## 2022-09-22 PROCEDURE — 1159F MED LIST DOCD IN RCRD: CPT | Mod: CPTII,S$GLB,, | Performed by: INTERNAL MEDICINE

## 2022-09-22 PROCEDURE — 4010F PR ACE/ARB THEARPY RXD/TAKEN: ICD-10-PCS | Mod: CPTII,S$GLB,, | Performed by: INTERNAL MEDICINE

## 2022-09-22 PROCEDURE — 3074F SYST BP LT 130 MM HG: CPT | Mod: CPTII,S$GLB,, | Performed by: INTERNAL MEDICINE

## 2022-09-22 PROCEDURE — 3008F PR BODY MASS INDEX (BMI) DOCUMENTED: ICD-10-PCS | Mod: CPTII,S$GLB,, | Performed by: INTERNAL MEDICINE

## 2022-09-22 PROCEDURE — 3008F BODY MASS INDEX DOCD: CPT | Mod: CPTII,S$GLB,, | Performed by: INTERNAL MEDICINE

## 2022-09-22 PROCEDURE — 99999 PR PBB SHADOW E&M-EST. PATIENT-LVL III: ICD-10-PCS | Mod: PBBFAC,,, | Performed by: INTERNAL MEDICINE

## 2022-09-22 PROCEDURE — 3079F DIAST BP 80-89 MM HG: CPT | Mod: CPTII,S$GLB,, | Performed by: INTERNAL MEDICINE

## 2022-09-22 PROCEDURE — 3079F PR MOST RECENT DIASTOLIC BLOOD PRESSURE 80-89 MM HG: ICD-10-PCS | Mod: CPTII,S$GLB,, | Performed by: INTERNAL MEDICINE

## 2022-09-22 NOTE — PROGRESS NOTES
[facility] Note    Chief Complaint      Chief Complaint   Patient presents with    Follow-up     6 month f/u       History of Present Illness      Rl Grubbs is a 46 y.o. male who presents today for   Chief Complaint   Patient presents with    Follow-up     6 month f/u   .  Patient comes to appointment here for 6m checkup for chronic issues as below . He is currently compliant with all meds . He is due for full labs with this visit . He admirts he has not been managing diet as well . He is very active with his job .     HPI    No problem-specific Assessment & Plan notes found for this encounter.       Problem List Items Addressed This Visit          Psychiatric    Depression, major, recurrent, mild    Overview     Cont welbutrin xl 300 mg is working and tolerating well is stable             Cardiac/Vascular    Benign essential hypertension - Primary    Overview     bp well controled cont all current meds all reviewed          Mixed hyperlipidemia    Overview     Cont  fenofibrate tolerating well needs repeat labs next visit             Renal/    Prostate cancer screening    Overview     c heck psa             Other    TOBIN (obstructive sleep apnea)    Overview     Utilizing cpap with great benefit all night every night .               Past Medical History:  History reviewed. No pertinent past medical history.    Past Surgical History:  Past Surgical History:   Procedure Laterality Date    CHOLECYSTECTOMY  7-28-21    HERNIA REPAIR  5-    ROBOT-ASSISTED CHOLECYSTECTOMY USING DA LORENE XI N/A 07/28/2021    Procedure: XI ROBOTIC CHOLECYSTECTOMY;  Surgeon: Brett Gastelum MD;  Location: [address];  Service: General;  Laterality: N/A;    ROBOT-ASSISTED LAPAROSCOPIC REPAIR OF EPIGASTRIC HERNIA N/A 07/28/2021    Procedure: ROBOTIC REPAIR, HERNIA, EPIGASTRIC;  Surgeon: Brett Gastelum MD;  Location: [address];  Service: General;  Laterality: N/A;  Incarcerated  visceral hernia repair         Family History:  family history includes Asthma in his mother; COPD in his mother; Cancer in his father; Diabetes in his brother; Hearing loss in his mother; Heart disease in his brother and mother; Hypertension in his brother and mother; Kidney disease in his mother.     Social History:  Social History     Socioeconomic History    Marital status:    Tobacco Use    Smoking status: Never    Smokeless tobacco: Never   Substance and Sexual Activity    Alcohol use: Yes     Alcohol/week: 6.0 standard drinks     Types: 6 Cans of beer per week     Comment: Socially    Drug use: Never    Sexual activity: Yes     Partners: Female     Birth control/protection: Post-menopausal, None     Social Determinants of Health     Financial Resource Strain: Low Risk     Difficulty of Paying Living Expenses: Not hard at all   Food Insecurity: No Food Insecurity    Worried About Running Out of Food in the Last Year: Never true    Ran Out of Food in the Last Year: Never true   Transportation Needs: No Transportation Needs    Lack of Transportation (Medical): No    Lack of Transportation (Non-Medical): No   Physical Activity: Sufficiently Active    Days of Exercise per Week: 3 days    Minutes of Exercise per Session: 60 min   Stress: Stress Concern Present    Feeling of Stress : To some extent   Social Connections: Unknown    Frequency of Communication with Friends and Family: Twice a week    Frequency of Social Gatherings with Friends and Family: Once a week    Active Member of Clubs or Organizations: No    Attends Club or Organization Meetings: Patient refused    Marital Status:    Housing Stability: Low Risk     Unable to Pay for Housing in the Last Year: No    Number of Places Lived in the Last Year: 1    Unstable Housing in the Last Year: No       Review of Systems:   Review of Systems   Constitutional:  Negative for fever and weight loss.   HENT:  Negative for congestion, hearing loss and  sore throat.    Eyes:  Negative for blurred vision.   Respiratory:  Negative for cough and shortness of breath.    Cardiovascular:  Negative for chest pain, palpitations, claudication and leg swelling.   Gastrointestinal:  Negative for abdominal pain, constipation, diarrhea, heartburn, nausea and vomiting.   Genitourinary:  Negative for dysuria.   Musculoskeletal:  Negative for back pain and myalgias.   Skin:  Negative for rash.   Neurological:  Negative for focal weakness and headaches.   Psychiatric/Behavioral:  Negative for depression, memory loss and suicidal ideas. The patient is not nervous/anxious.       Medications:  Outpatient Encounter Medications as of 9/22/2022   Medication Sig Note Dispense Refill    buPROPion (WELLBUTRIN XL) 300 MG 24 hr tablet Take 1 tablet (300 mg total) by mouth once daily.  90 tablet 3    cetirizine (ZYRTEC) 10 MG tablet Take 10 mg by mouth once daily. 7/27/2021: Take as prescribed am of procedure                           esomeprazole (NEXIUM) 40 MG capsule TAKE 1 CAPSULE(40 MG) BY MOUTH EVERY DAY  90 capsule 3    fenofibrate 160 MG Tab Take 1 tablet (160 mg total) by mouth once daily.  90 tablet 3    irbesartan (AVAPRO) 300 MG tablet TAKE 1 TABLET(300 MG) BY MOUTH EVERY DAY  90 tablet 3    omega 3-dha-epa-fish oil 1,000 mg (120 mg-180 mg) Cap Take 1,000 mg by mouth once daily.       [DISCONTINUED] ibuprofen (ADVIL,MOTRIN) 800 MG tablet Take 1 tablet (800 mg total) by mouth every 6 (six) hours as needed for Pain.  60 tablet 0    [DISCONTINUED] meloxicam (MOBIC) 15 MG tablet Take 1 tablet (15 mg total) by mouth once daily.  30 tablet 0     No facility-administered encounter medications on file as of 9/22/2022.       Allergies:  Review of patient's allergies indicates:  No Known Allergies      Physical Exam      Vitals:    09/22/22 1605   BP: 120/80   Pulse: 96        Vital Signs  Pulse: 96  SpO2: 98 %  BP: 120/80  BP Location: Right arm  Patient Position: Sitting  Pain Score: 0-No  "pain  Height and Weight  Height: 5' 11" (180.3 cm)  Weight: 107.4 kg (236 lb 12.4 oz)  BSA (Calculated - sq m): 2.32 sq meters  BMI (Calculated): 33  Weight in (lb) to have BMI = 25: 178.9]     Body mass index is 33.02 kg/m².    Physical Exam  Constitutional:       Appearance: He is well-developed.   HENT:      Head: Normocephalic.   Eyes:      Pupils: Pupils are equal, round, and reactive to light.   Neck:      Thyroid: No thyromegaly.   Cardiovascular:      Rate and Rhythm: Normal rate and regular rhythm.      Heart sounds: No murmur heard.    No friction rub. No gallop.   Pulmonary:      Effort: Pulmonary effort is normal.      Breath sounds: Normal breath sounds.   Abdominal:      General: Bowel sounds are normal.      Palpations: Abdomen is soft.   Musculoskeletal:         General: Normal range of motion.      Cervical back: Normal range of motion.   Skin:     General: Skin is warm and dry.   Neurological:      Mental Status: He is alert and oriented to person, place, and time.      Sensory: No sensory deficit.   Psychiatric:         Behavior: Behavior normal.        Laboratory:  CBC:  No results for input(s): WBC, RBC, HGB, HCT, PLT, MCV, MCH, MCHC in the last 2160 hours.  CMP:  No results for input(s): GLU, CALCIUM, ALBUMIN, PROT, NA, K, CO2, CL, BUN, ALKPHOS, ALT, AST, BILITOT in the last 2160 hours.    Invalid input(s): CREATININ  URINALYSIS:  No results for input(s): COLORU, CLARITYU, SPECGRAV, PHUR, PROTEINUA, GLUCOSEU, BILIRUBINCON, BLOODU, WBCU, RBCU, BACTERIA, MUCUS, NITRITE, LEUKOCYTESUR, UROBILINOGEN, HYALINECASTS in the last 2160 hours.   LIPIDS:  No results for input(s): TSH, HDL, CHOL, TRIG, LDLCALC, CHOLHDL, NONHDLCHOL, TOTALCHOLEST in the last 2160 hours.  TSH:  No results for input(s): TSH in the last 2160 hours.  A1C:  No results for input(s): HGBA1C in the last 2160 hours.    Radiology:        Assessment:     Rl Grubbs is a 46 y.o.male with:    Benign essential hypertension  -     CBC " Auto Differential; Future; Expected date: 09/22/2022    Depression, major, recurrent, mild    Mixed hyperlipidemia  -     Comprehensive Metabolic Panel; Future; Expected date: 09/22/2022  -     Lipid Panel; Future; Expected date: 09/22/2022    TOBIN (obstructive sleep apnea)    Prostate cancer screening  -     PSA, Screening; Future; Expected date: 09/22/2022              Plan:     Problem List Items Addressed This Visit          Psychiatric    Depression, major, recurrent, mild    Overview     Cont welbutrin xl 300 mg is working and tolerating well is stable             Cardiac/Vascular    Benign essential hypertension - Primary    Overview     bp well controled cont all current meds all reviewed          Mixed hyperlipidemia    Overview     Cont  fenofibrate tolerating well needs repeat labs next visit             Renal/    Prostate cancer screening    Overview     c heck psa             Other    TOBIN (obstructive sleep apnea)    Overview     Utilizing cpap with great benefit all night every night .              As above, continue current medications and maintain follow up with specialists.  Return to clinic in 6 months.      Frederick W Dantagnan [facility] [place]

## 2022-09-30 LAB
ALBUMIN SERPL-MCNC: 4.5 G/DL (ref 3.6–5.1)
ALBUMIN/GLOB SERPL: 1.6 (CALC) (ref 1–2.5)
ALP SERPL-CCNC: 45 U/L (ref 36–130)
ALT SERPL-CCNC: 16 U/L (ref 9–46)
AST SERPL-CCNC: 15 U/L (ref 10–40)
BASOPHILS # BLD AUTO: 29 CELLS/UL (ref 0–200)
BASOPHILS NFR BLD AUTO: 0.5 %
BILIRUB SERPL-MCNC: 0.5 MG/DL (ref 0.2–1.2)
BUN SERPL-MCNC: 15 MG/DL (ref 7–25)
BUN/CREAT SERPL: ABNORMAL (CALC) (ref 6–22)
CALCIUM SERPL-MCNC: 9.9 MG/DL (ref 8.6–10.3)
CHLORIDE SERPL-SCNC: 100 MMOL/L (ref 98–110)
CHOLEST SERPL-MCNC: 146 MG/DL
CHOLEST/HDLC SERPL: 3.2 (CALC)
CO2 SERPL-SCNC: 30 MMOL/L (ref 20–32)
CREAT SERPL-MCNC: 1.07 MG/DL (ref 0.6–1.29)
EGFR: 86 ML/MIN/1.73M2
EOSINOPHIL # BLD AUTO: 151 CELLS/UL (ref 15–500)
EOSINOPHIL NFR BLD AUTO: 2.6 %
ERYTHROCYTE [DISTWIDTH] IN BLOOD BY AUTOMATED COUNT: 13 % (ref 11–15)
GLOBULIN SER CALC-MCNC: 2.9 G/DL (CALC) (ref 1.9–3.7)
GLUCOSE SERPL-MCNC: 112 MG/DL (ref 65–99)
HCT VFR BLD AUTO: 47.6 % (ref 38.5–50)
HDLC SERPL-MCNC: 45 MG/DL
HGB BLD-MCNC: 15.5 G/DL (ref 13.2–17.1)
LDLC SERPL CALC-MCNC: 81 MG/DL (CALC)
LYMPHOCYTES # BLD AUTO: 1885 CELLS/UL (ref 850–3900)
LYMPHOCYTES NFR BLD AUTO: 32.5 %
MCH RBC QN AUTO: 27.8 PG (ref 27–33)
MCHC RBC AUTO-ENTMCNC: 32.6 G/DL (ref 32–36)
MCV RBC AUTO: 85.3 FL (ref 80–100)
MONOCYTES # BLD AUTO: 342 CELLS/UL (ref 200–950)
MONOCYTES NFR BLD AUTO: 5.9 %
NEUTROPHILS # BLD AUTO: 3393 CELLS/UL (ref 1500–7800)
NEUTROPHILS NFR BLD AUTO: 58.5 %
NONHDLC SERPL-MCNC: 101 MG/DL (CALC)
PLATELET # BLD AUTO: 256 THOUSAND/UL (ref 140–400)
PMV BLD REES-ECKER: 8.9 FL (ref 7.5–12.5)
POTASSIUM SERPL-SCNC: 4.8 MMOL/L (ref 3.5–5.3)
PROT SERPL-MCNC: 7.4 G/DL (ref 6.1–8.1)
PSA SERPL-MCNC: 0.52 NG/ML
RBC # BLD AUTO: 5.58 MILLION/UL (ref 4.2–5.8)
SODIUM SERPL-SCNC: 136 MMOL/L (ref 135–146)
TRIGL SERPL-MCNC: 107 MG/DL
WBC # BLD AUTO: 5.8 THOUSAND/UL (ref 3.8–10.8)

## 2022-12-14 ENCOUNTER — PATIENT MESSAGE (OUTPATIENT)
Dept: SPORTS MEDICINE | Facility: CLINIC | Age: 47
End: 2022-12-14
Payer: COMMERCIAL

## 2022-12-14 DIAGNOSIS — M47.816 LUMBAR SPONDYLOSIS: ICD-10-CM

## 2022-12-14 DIAGNOSIS — B96.89 ACUTE BACTERIAL SINUSITIS: ICD-10-CM

## 2022-12-14 DIAGNOSIS — J01.90 ACUTE BACTERIAL SINUSITIS: ICD-10-CM

## 2022-12-15 RX ORDER — IBUPROFEN 800 MG/1
800 TABLET ORAL EVERY 6 HOURS PRN
Qty: 60 TABLET | Refills: 0 | Status: SHIPPED | OUTPATIENT
Start: 2022-12-15 | End: 2023-10-09 | Stop reason: SDUPTHER

## 2022-12-19 ENCOUNTER — PATIENT MESSAGE (OUTPATIENT)
Dept: INTERNAL MEDICINE | Facility: CLINIC | Age: 47
End: 2022-12-19
Payer: COMMERCIAL

## 2023-01-07 RX ORDER — FENOFIBRATE 160 MG/1
TABLET ORAL
Qty: 90 TABLET | Refills: 2 | Status: SHIPPED | OUTPATIENT
Start: 2023-01-07 | End: 2023-01-13

## 2023-01-07 NOTE — TELEPHONE ENCOUNTER
No new care gaps identified.  [facility] Embedded Care Gaps. Reference number: [id number]. 1/07/2023   5:22:39 AM CST

## 2023-01-07 NOTE — TELEPHONE ENCOUNTER
Refill Decision Note   Rl Grubbs  is requesting a refill authorization.  Brief Assessment and Rationale for Refill:  Approve     Medication Therapy Plan:       Medication Reconciliation Completed: No   Comments:     No Care Gaps recommended.     Note composed:1:30 PM 01/07/2023

## 2023-01-10 ENCOUNTER — OFFICE VISIT (OUTPATIENT)
Dept: SPORTS MEDICINE | Facility: CLINIC | Age: 48
End: 2023-01-10
Payer: COMMERCIAL

## 2023-01-10 VITALS
DIASTOLIC BLOOD PRESSURE: 93 MMHG | WEIGHT: 236 LBS | HEART RATE: 94 BPM | HEIGHT: 71 IN | SYSTOLIC BLOOD PRESSURE: 142 MMHG | BODY MASS INDEX: 33.04 KG/M2

## 2023-01-10 DIAGNOSIS — M99.06 SOMATIC DYSFUNCTION OF LOWER EXTREMITY: ICD-10-CM

## 2023-01-10 DIAGNOSIS — M21.70 LEG LENGTH DISCREPANCY: ICD-10-CM

## 2023-01-10 DIAGNOSIS — M99.05 SOMATIC DYSFUNCTION OF PELVIS REGION: ICD-10-CM

## 2023-01-10 DIAGNOSIS — M99.04 SOMATIC DYSFUNCTION OF SACRAL REGION: ICD-10-CM

## 2023-01-10 DIAGNOSIS — M99.03 SOMATIC DYSFUNCTION OF LUMBAR REGION: ICD-10-CM

## 2023-01-10 DIAGNOSIS — M47.816 LUMBAR SPONDYLOSIS: Primary | ICD-10-CM

## 2023-01-10 DIAGNOSIS — M54.59 MECHANICAL LOW BACK PAIN: ICD-10-CM

## 2023-01-10 DIAGNOSIS — M99.02 SOMATIC DYSFUNCTION OF THORACIC REGION: ICD-10-CM

## 2023-01-10 DIAGNOSIS — M99.08 SOMATIC DYSFUNCTION OF RIB CAGE REGION: ICD-10-CM

## 2023-01-10 PROCEDURE — 3080F DIAST BP >= 90 MM HG: CPT | Mod: CPTII,S$GLB,, | Performed by: ORTHOPAEDIC SURGERY

## 2023-01-10 PROCEDURE — 99214 PR OFFICE/OUTPT VISIT, EST, LEVL IV, 30-39 MIN: ICD-10-PCS | Mod: 25,S$GLB,, | Performed by: ORTHOPAEDIC SURGERY

## 2023-01-10 PROCEDURE — 98927 OSTEOPATH MANJ 5-6 REGIONS: CPT | Mod: S$GLB,,, | Performed by: ORTHOPAEDIC SURGERY

## 2023-01-10 PROCEDURE — 99999 PR PBB SHADOW E&M-EST. PATIENT-LVL III: ICD-10-PCS | Mod: PBBFAC,,, | Performed by: ORTHOPAEDIC SURGERY

## 2023-01-10 PROCEDURE — 99214 OFFICE O/P EST MOD 30 MIN: CPT | Mod: 25,S$GLB,, | Performed by: ORTHOPAEDIC SURGERY

## 2023-01-10 PROCEDURE — 3077F PR MOST RECENT SYSTOLIC BLOOD PRESSURE >= 140 MM HG: ICD-10-PCS | Mod: CPTII,S$GLB,, | Performed by: ORTHOPAEDIC SURGERY

## 2023-01-10 PROCEDURE — 3008F BODY MASS INDEX DOCD: CPT | Mod: CPTII,S$GLB,, | Performed by: ORTHOPAEDIC SURGERY

## 2023-01-10 PROCEDURE — 3080F PR MOST RECENT DIASTOLIC BLOOD PRESSURE >= 90 MM HG: ICD-10-PCS | Mod: CPTII,S$GLB,, | Performed by: ORTHOPAEDIC SURGERY

## 2023-01-10 PROCEDURE — 1159F MED LIST DOCD IN RCRD: CPT | Mod: CPTII,S$GLB,, | Performed by: ORTHOPAEDIC SURGERY

## 2023-01-10 PROCEDURE — 1159F PR MEDICATION LIST DOCUMENTED IN MEDICAL RECORD: ICD-10-PCS | Mod: CPTII,S$GLB,, | Performed by: ORTHOPAEDIC SURGERY

## 2023-01-10 PROCEDURE — 3077F SYST BP >= 140 MM HG: CPT | Mod: CPTII,S$GLB,, | Performed by: ORTHOPAEDIC SURGERY

## 2023-01-10 PROCEDURE — 98927 PR OSTEOPATHIC MANIP,5-6 BODY REGN: ICD-10-PCS | Mod: S$GLB,,, | Performed by: ORTHOPAEDIC SURGERY

## 2023-01-10 PROCEDURE — 1160F RVW MEDS BY RX/DR IN RCRD: CPT | Mod: CPTII,S$GLB,, | Performed by: ORTHOPAEDIC SURGERY

## 2023-01-10 PROCEDURE — 1160F PR REVIEW ALL MEDS BY PRESCRIBER/CLIN PHARMACIST DOCUMENTED: ICD-10-PCS | Mod: CPTII,S$GLB,, | Performed by: ORTHOPAEDIC SURGERY

## 2023-01-10 PROCEDURE — 3008F PR BODY MASS INDEX (BMI) DOCUMENTED: ICD-10-PCS | Mod: CPTII,S$GLB,, | Performed by: ORTHOPAEDIC SURGERY

## 2023-01-10 PROCEDURE — 99999 PR PBB SHADOW E&M-EST. PATIENT-LVL III: CPT | Mod: PBBFAC,,, | Performed by: ORTHOPAEDIC SURGERY

## 2023-01-10 NOTE — PROGRESS NOTES
CC: low back pain    Pako is here today for follow up evaluation of his low pain. Patient reports pain is dependent on what he does the day to two days before. Went to the chiropractor last week. Has tried physical therapy, HEP with minimal relief. Is looking for conservative treatment. He denies new falls or trauma since his last visit and denies radicular symptoms.     Recall from visit on 6/14/2022  Pako is here today for follow up evaluation of his low back pain. Patient reports his pain is 0/10 today. He has been attending physical therapy twice per week and has been compliant with his HEP. He feels as though this has been helpful, but he continues to appreciate right sided low back pain that is increased the day after exertion. He denies new falls or trauma since his last visit and denies radicular symptoms.       PAST MEDICAL HISTORY:   History reviewed. No pertinent past medical history.    PAST SURGICAL HISTORY:   Past Surgical History:   Procedure Laterality Date    CHOLECYSTECTOMY  7-28-21    HERNIA REPAIR  7-    ROBOT-ASSISTED CHOLECYSTECTOMY USING DA LORENE XI N/A 07/28/2021    Procedure: XI ROBOTIC CHOLECYSTECTOMY;  Surgeon: Brett Gastelum MD;  Location: [address];  Service: General;  Laterality: N/A;    ROBOT-ASSISTED LAPAROSCOPIC REPAIR OF EPIGASTRIC HERNIA N/A 07/28/2021    Procedure: ROBOTIC REPAIR, HERNIA, EPIGASTRIC;  Surgeon: Brett Gastelum MD;  Location: [address];  Service: General;  Laterality: N/A;  Incarcerated visceral hernia repair         FAMILY HISTORY:   Family History   Problem Relation Age of Onset    Asthma Mother     COPD Mother     Hearing loss Mother     Heart disease Mother     Hypertension Mother     Kidney disease Mother     Cancer Father     Heart disease Brother     Hypertension Brother     Diabetes Brother        SOCIAL HISTORY:   Social History     Socioeconomic History    Marital status:    Tobacco Use    Smoking status: Never     Smokeless tobacco: Never   Substance and Sexual Activity    Alcohol use: Yes     Alcohol/week: 6.0 standard drinks     Types: 6 Cans of beer per week     Comment: Socially    Drug use: Never    Sexual activity: Yes     Partners: Female     Birth control/protection: Post-menopausal, None     Social Determinants of Health     Financial Resource Strain: Low Risk     Difficulty of Paying Living Expenses: Not hard at all   Food Insecurity: No Food Insecurity    Worried About Running Out of Food in the Last Year: Never true    Ran Out of Food in the Last Year: Never true   Transportation Needs: No Transportation Needs    Lack of Transportation (Medical): No    Lack of Transportation (Non-Medical): No   Physical Activity: Sufficiently Active    Days of Exercise per Week: 3 days    Minutes of Exercise per Session: 60 min   Stress: Stress Concern Present    Feeling of Stress : To some extent   Social Connections: Unknown    Frequency of Communication with Friends and Family: Twice a week    Frequency of Social Gatherings with Friends and Family: Once a week    Active Member of Clubs or Organizations: No    Attends Club or Organization Meetings: Patient refused    Marital Status:    Housing Stability: Low Risk     Unable to Pay for Housing in the Last Year: No    Number of Places Lived in the Last Year: 1    Unstable Housing in the Last Year: No       MEDICATIONS:     Current Outpatient Medications:     cetirizine (ZYRTEC) 10 MG tablet, Take 10 mg by mouth once daily., Disp: , Rfl:     esomeprazole (NEXIUM) 40 MG capsule, TAKE 1 CAPSULE(40 MG) BY MOUTH EVERY DAY, Disp: 90 capsule, Rfl: 3    ibuprofen (ADVIL,MOTRIN) 800 MG tablet, Take 1 tablet (800 mg total) by mouth every 6 (six) hours as needed for Pain., Disp: 60 tablet, Rfl: 0    irbesartan (AVAPRO) 300 MG tablet, TAKE 1 TABLET(300 MG) BY MOUTH EVERY DAY, Disp: 90 tablet, Rfl: 3    omega 3-dha-epa-fish oil 1,000 mg (120 mg-180 mg) Cap, Take 1,000 mg by mouth once  "daily., Disp: , Rfl:     buPROPion (WELLBUTRIN XL) 300 MG 24 hr tablet, Take 1 tablet (300 mg total) by mouth once daily., Disp: 90 tablet, Rfl: 3    ALLERGIES:   Review of patient's allergies indicates:  No Known Allergies     PHYSICAL EXAMINATION:  BP (!) 142/93   Pulse 94   Ht 5' 11" (1.803 m)   Wt 107 kg (236 lb)   BMI 32.92 kg/m²   Vitals signs and nursing note have been reviewed.  General: In no acute distress, well developed, well nourished, no diaphoresis  Eyes: EOM full and smooth, no eye redness or discharge  HENT: normocephalic and atraumatic, neck supple, trachea midline, no nasal discharge, no external ear redness or discharge  Cardiovascular: no LE edema  Lungs: respirations non-labored, no conversational dyspnea   Abd: non-distended, no rigidity  MSK: no amputation or deformity, no swelling of extremities  Neuro: AAOx3, CN2-12 grossly intact  Skin: No rashes, warm and dry  Psychiatric: cooperative, pleasant, mood and affect appropriate for age    Lumbar Spine: right lumbar region    Observation:    Normal cervicothoracolumbar curves.    No obvious pelvic obliquity while standing.    No edema, erythema, or ecchymosis noted in lumbosacral region.    No midline skin abnormalities.    No atrophy of lower limb musculature.    Tenderness:  + tenderness throughout the right lower lumbar spine  No tenderness over the right sacral base   No tenderness over the piriformis, greater/lesser trochanters.  No bony deformities or step-offs palpated.     Range of Motion:  Active flexion to 60°.   Active extension to 25°.   Active rotation to 30° on left and 30° on right.    Active sidebending to 25° on left and 25° on right.  Passive hip flexion to 135° on left and 135° on right.    Passive hip internal rotation to 45° on left and 45° on right.    Passive hip external rotation to 45° on left and 45° on right.      Strength Testing:  Hip flexion - 5/5 on left and 5/5 on right  Hip extension - 5/5 on left and 5/5 on " right  Knee flexion - 5/5 on left and 5/5 on right  Knee extension - 5/5 on left and 5/5 on right  Dorsiflexion - 5/5 on left and 5/5 on right  Plantarflexion - 5/5 on left and 5/5 on right  Great toe extension - 5/5 on left and 5/5 on right    Special Tests:  Standing Nava's test - negative on left and negative on right  Stork test - negative on left and negative on right    Seated straight leg raise - negative on left and negative on right  Supine straight leg raise - negative on left and negative on right  Slump test - negative on left and negative on right  Provocation maneuvers exhibit no worsening of symptoms on left or on right.    LINUS test - negative  FADIR test - negative  Log roll test - negative    Neurovascular Exam:  Sensation intact to light touch in the L2-S2 dermatomes bilaterally.   No pretibial edema or abnormal hair pattern of the shin.    Intact and symmetric DP and PT pulses bilaterally.  Normal gait without trendelenberg, heel walking, toe walking, and tandem walking.    Posture:  Upright and Anterior pelvic tilt with increased lumbar lordosis  Gait: Non-antalgic with Neutral ankle mechanics     TART (Tissue texture abnormality, Asymmetry,  Restriction of motion and/or Tenderness) changes:    Head:     Cervical Spine  Thoracic Spine  Lumbar Spine   C1  T1  L1 NSLRR   C2  T2  L2 Neutral   C3  T3 Neutral L3 Neutral   C4  T4 Neutral L4 FRSL   C5  T5 Neutral L5 FRSL   C6  T6 Neutral     C7  T7 Neutral       T8 Neutral       T9 NSLRR       T10 NSLRR       T11 NSLRR       T12 NSLRR       Myofascial restriction right quadratus lumborum  Fascial herniated trigger point at the lateral aspect of the right twelfth rib in the quadratus lumborum    Ribs:  Exhalation restriction right lower rib cage    Upper Extremity:    Abdomen:    Pelvis:  Innominate:Right superior shear  Pubic bone:Right superior pubic shear     Sacrum:Right on Right sacral torsion     Lower Extremity:  Internal tibial torsion on the  right    Key   F= Flexed   E = Extended   R = Rotated   N = Neutral   S = Sidebent   TTA = tissue texture abnormality   L/R/B = left/right/bilateral (last letter)       IMAGIN. X-ray obtained 02/15/2022 due to low back pain   2. X-ray images were reviewed personally by me and then directly with patient.  3. FINDINGS: X-ray images obtained demonstrate chronic degenerative change of the lumbar spine, wedge deformity on T12, disc space narrowed most pronounced at L3-4, osteoarthritic changes of the lower lumbar facet joints  4. IMPRESSION: As above.     1. MRI obtained 2022 due to low back pain  2. MRI images were reviewed personally by me and then directly with patient.  3. FINDINGS: MRI images obtained demonstrate broad based dorsal disc protrusion with mild thecal sac compression at L1.  Degenerative disc disease most noticeable at the L4 disc.  Mild degenerative changes of the lower lumbar spine. No significant spinal or foraminal stenosis.  4. IMPRESSION: As above.       ASSESSMENT:      ICD-10-CM ICD-9-CM   1. Lumbar spondylosis  M47.816 721.3   2. Mechanical low back pain  M54.59 724.2   3. Leg length discrepancy  M21.70 736.81   4. Somatic dysfunction of lumbar region  M99.03 739.3   5. Somatic dysfunction of pelvis region  M99.05 739.5   6. Somatic dysfunction of lower extremity  M99.06 739.6   7. Somatic dysfunction of sacral region  M99.04 739.4   8. Somatic dysfunction of thoracic region  M99.02 739.2   9. Somatic dysfunction of rib cage region  M99.08 739.8         PLAN:  1-2.  Mechanical low back pain -   3. Lumbar spondylosis/leg-length discrepancy -     - Pako admits to right low back pain for the past two years without red-flag symptoms that increases with getting out of bed and in proportion to his activity level.  He has completed physical therapy and has been consistent with his home exercise program.  His pain is inconsistent and will have good days and bad days.  When asked where he  hurts, he gestures to the right low back along the paraspinals and the quadratus lumborum.    - Based on his description of pain/body language and somatic dysfunction identified on exam, I discussed osteopathic manipulation as a treatment option today. He consents to evaluation and treatment. See below.    - HEP for abdominal bracing, ant marches, pelvic clocks 6-12, diaphragm breathing prescribed at initial visit.  Continue with these daily.       4-9.  Somatic dysfunction of lumbar, pelvis, sacral, thoracic, lower extremity, rib regions -     - OMT 5-6 regions. Oral consent obtained. Reviewed benefits and potential side effects. OMT indicated today due to signs and symptoms as well as local and remote somatic dysfunction findings and their related neurokinetic, lymphatic, fascial and/or arteriovenous body connections. OMT techniques used: Soft Tissue, Myofascial Release, Muscle Energy, High Velocity Low Amplitude, and Fascial Distortion Model. Treatment was tolerated well. Improvement noted in segmental mobility post-treatment in dysfunctional regions. There were no adverse events and no complications immediately following treatment. Advised plenty of water to help alleviate soreness.      Future planning includes - possibly more OMT if helpful and if indicated    All questions were answered to the best of my ability and all concerns were addressed at this time.    Follow up as needed.       This note is dictated using the M*Modal Fluency Direct word recognition program. There are word recognition mistakes that are occasionally missed on review.      Total time spent face-to face with patient counseling or coordinating care including prognosis, differential diagnosis, risks and benefits of treatment, instructions, compliance risk reductions as well as non-face-to-face time personally spent reviewing medial record, medical documentation, and coordination of care.     EST MINUTES X   03041 10-19    [id number] 20-29     [id number] 30-39 X   [id number] 40-54    NEW     93319 15-29    67132 30-44    24471 45-59    51715 60-74    PHONE      5-10    10843 11-20    [id number] 21-30

## 2023-01-11 ENCOUNTER — PATIENT MESSAGE (OUTPATIENT)
Dept: PRIMARY CARE CLINIC | Facility: CLINIC | Age: 48
End: 2023-01-11
Payer: COMMERCIAL

## 2023-01-11 RX ORDER — BUPROPION HYDROCHLORIDE 300 MG/1
300 TABLET ORAL DAILY
Qty: 90 TABLET | Refills: 3 | Status: SHIPPED | OUTPATIENT
Start: 2023-01-11 | End: 2023-05-05 | Stop reason: SDUPTHER

## 2023-01-11 NOTE — TELEPHONE ENCOUNTER
No new care gaps identified.  [facility] Embedded Care Gaps. Reference number: [id number]. 1/11/2023   8:45:30 AM CST

## 2023-01-12 ENCOUNTER — PATIENT MESSAGE (OUTPATIENT)
Dept: PRIMARY CARE CLINIC | Facility: CLINIC | Age: 48
End: 2023-01-12
Payer: COMMERCIAL

## 2023-01-12 DIAGNOSIS — E78.2 MIXED HYPERLIPIDEMIA: Primary | ICD-10-CM

## 2023-01-12 DIAGNOSIS — E78.1 PURE HYPERTRIGLYCERIDEMIA: ICD-10-CM

## 2023-01-12 RX ORDER — ALBUTEROL SULFATE 0.83 MG/ML
2.5 SOLUTION RESPIRATORY (INHALATION) EVERY 6 HOURS PRN
Refills: 0 | Status: CANCELLED | OUTPATIENT
Start: 2023-01-12 | End: 2024-01-12

## 2023-01-13 ENCOUNTER — PATIENT MESSAGE (OUTPATIENT)
Dept: PRIMARY CARE CLINIC | Facility: CLINIC | Age: 48
End: 2023-01-13
Payer: COMMERCIAL

## 2023-01-13 RX ORDER — FENOFIBRATE 145 MG/1
145 TABLET, FILM COATED ORAL DAILY
Qty: 90 TABLET | Refills: 3 | Status: SHIPPED | OUTPATIENT
Start: 2023-01-13 | End: 2024-02-11 | Stop reason: SDUPTHER

## 2023-01-18 ENCOUNTER — PATIENT MESSAGE (OUTPATIENT)
Dept: OTOLARYNGOLOGY | Facility: CLINIC | Age: 48
End: 2023-01-18

## 2023-01-18 ENCOUNTER — OFFICE VISIT (OUTPATIENT)
Dept: OTOLARYNGOLOGY | Facility: CLINIC | Age: 48
End: 2023-01-18
Payer: COMMERCIAL

## 2023-01-18 VITALS
BODY MASS INDEX: 32.9 KG/M2 | WEIGHT: 235.88 LBS | SYSTOLIC BLOOD PRESSURE: 144 MMHG | DIASTOLIC BLOOD PRESSURE: 106 MMHG | HEART RATE: 87 BPM

## 2023-01-18 DIAGNOSIS — R09.82 POST-NASAL DRIP: ICD-10-CM

## 2023-01-18 DIAGNOSIS — K21.9 GASTROESOPHAGEAL REFLUX DISEASE, UNSPECIFIED WHETHER ESOPHAGITIS PRESENT: Primary | ICD-10-CM

## 2023-01-18 DIAGNOSIS — K12.1 ULCER OF SOFT PALATE: ICD-10-CM

## 2023-01-18 PROCEDURE — 99203 OFFICE O/P NEW LOW 30 MIN: CPT | Mod: S$GLB,,, | Performed by: NURSE PRACTITIONER

## 2023-01-18 PROCEDURE — 3077F SYST BP >= 140 MM HG: CPT | Mod: CPTII,S$GLB,, | Performed by: NURSE PRACTITIONER

## 2023-01-18 PROCEDURE — 99203 PR OFFICE/OUTPT VISIT, NEW, LEVL III, 30-44 MIN: ICD-10-PCS | Mod: S$GLB,,, | Performed by: NURSE PRACTITIONER

## 2023-01-18 PROCEDURE — 1159F MED LIST DOCD IN RCRD: CPT | Mod: CPTII,S$GLB,, | Performed by: NURSE PRACTITIONER

## 2023-01-18 PROCEDURE — 99999 PR PBB SHADOW E&M-EST. PATIENT-LVL III: ICD-10-PCS | Mod: PBBFAC,,, | Performed by: NURSE PRACTITIONER

## 2023-01-18 PROCEDURE — 3008F PR BODY MASS INDEX (BMI) DOCUMENTED: ICD-10-PCS | Mod: CPTII,S$GLB,, | Performed by: NURSE PRACTITIONER

## 2023-01-18 PROCEDURE — 1159F PR MEDICATION LIST DOCUMENTED IN MEDICAL RECORD: ICD-10-PCS | Mod: CPTII,S$GLB,, | Performed by: NURSE PRACTITIONER

## 2023-01-18 PROCEDURE — 99999 PR PBB SHADOW E&M-EST. PATIENT-LVL III: CPT | Mod: PBBFAC,,, | Performed by: NURSE PRACTITIONER

## 2023-01-18 PROCEDURE — 3080F DIAST BP >= 90 MM HG: CPT | Mod: CPTII,S$GLB,, | Performed by: NURSE PRACTITIONER

## 2023-01-18 PROCEDURE — 3008F BODY MASS INDEX DOCD: CPT | Mod: CPTII,S$GLB,, | Performed by: NURSE PRACTITIONER

## 2023-01-18 PROCEDURE — 3080F PR MOST RECENT DIASTOLIC BLOOD PRESSURE >= 90 MM HG: ICD-10-PCS | Mod: CPTII,S$GLB,, | Performed by: NURSE PRACTITIONER

## 2023-01-18 PROCEDURE — 3077F PR MOST RECENT SYSTOLIC BLOOD PRESSURE >= 140 MM HG: ICD-10-PCS | Mod: CPTII,S$GLB,, | Performed by: NURSE PRACTITIONER

## 2023-01-18 RX ORDER — FLUTICASONE PROPIONATE 50 MCG
2 SPRAY, SUSPENSION (ML) NASAL DAILY
Qty: 16 G | Refills: 12 | Status: SHIPPED | OUTPATIENT
Start: 2023-01-18 | End: 2023-02-17

## 2023-01-18 RX ORDER — METHYLPREDNISOLONE 4 MG/1
TABLET ORAL
Qty: 21 EACH | Refills: 0 | Status: SHIPPED | OUTPATIENT
Start: 2023-01-18 | End: 2023-02-08

## 2023-02-01 ENCOUNTER — OFFICE VISIT (OUTPATIENT)
Dept: OTOLARYNGOLOGY | Facility: CLINIC | Age: 48
End: 2023-02-01
Payer: COMMERCIAL

## 2023-02-01 VITALS
HEIGHT: 71 IN | HEART RATE: 102 BPM | WEIGHT: 240.5 LBS | DIASTOLIC BLOOD PRESSURE: 97 MMHG | BODY MASS INDEX: 33.67 KG/M2 | SYSTOLIC BLOOD PRESSURE: 143 MMHG

## 2023-02-01 DIAGNOSIS — K09.8 ORAL CYST: ICD-10-CM

## 2023-02-01 PROCEDURE — 99999 PR PBB SHADOW E&M-EST. PATIENT-LVL III: ICD-10-PCS | Mod: PBBFAC,,, | Performed by: NURSE PRACTITIONER

## 2023-02-01 PROCEDURE — 3008F BODY MASS INDEX DOCD: CPT | Mod: CPTII,S$GLB,, | Performed by: NURSE PRACTITIONER

## 2023-02-01 PROCEDURE — 1159F MED LIST DOCD IN RCRD: CPT | Mod: CPTII,S$GLB,, | Performed by: NURSE PRACTITIONER

## 2023-02-01 PROCEDURE — 31575 DIAGNOSTIC LARYNGOSCOPY: CPT | Mod: S$GLB,,, | Performed by: NURSE PRACTITIONER

## 2023-02-01 PROCEDURE — 3077F SYST BP >= 140 MM HG: CPT | Mod: CPTII,S$GLB,, | Performed by: NURSE PRACTITIONER

## 2023-02-01 PROCEDURE — 99213 PR OFFICE/OUTPT VISIT, EST, LEVL III, 20-29 MIN: ICD-10-PCS | Mod: 25,S$GLB,, | Performed by: NURSE PRACTITIONER

## 2023-02-01 PROCEDURE — 99999 PR PBB SHADOW E&M-EST. PATIENT-LVL III: CPT | Mod: PBBFAC,,, | Performed by: NURSE PRACTITIONER

## 2023-02-01 PROCEDURE — 3080F DIAST BP >= 90 MM HG: CPT | Mod: CPTII,S$GLB,, | Performed by: NURSE PRACTITIONER

## 2023-02-01 PROCEDURE — 31575 PR LARYNGOSCOPY, FLEXIBLE; DIAGNOSTIC: ICD-10-PCS | Mod: S$GLB,,, | Performed by: NURSE PRACTITIONER

## 2023-02-01 PROCEDURE — 99213 OFFICE O/P EST LOW 20 MIN: CPT | Mod: 25,S$GLB,, | Performed by: NURSE PRACTITIONER

## 2023-02-01 PROCEDURE — 3077F PR MOST RECENT SYSTOLIC BLOOD PRESSURE >= 140 MM HG: ICD-10-PCS | Mod: CPTII,S$GLB,, | Performed by: NURSE PRACTITIONER

## 2023-02-01 PROCEDURE — 1159F PR MEDICATION LIST DOCUMENTED IN MEDICAL RECORD: ICD-10-PCS | Mod: CPTII,S$GLB,, | Performed by: NURSE PRACTITIONER

## 2023-02-01 PROCEDURE — 3080F PR MOST RECENT DIASTOLIC BLOOD PRESSURE >= 90 MM HG: ICD-10-PCS | Mod: CPTII,S$GLB,, | Performed by: NURSE PRACTITIONER

## 2023-02-01 PROCEDURE — 3008F PR BODY MASS INDEX (BMI) DOCUMENTED: ICD-10-PCS | Mod: CPTII,S$GLB,, | Performed by: NURSE PRACTITIONER

## 2023-02-01 NOTE — ASSESSMENT & PLAN NOTE
Benign appearing cystic lesion vs mucocele to soft palate. Discussed observation at this time. He will let me know if it becomes painful or if he has any other symptoms. Questions answered. RTC prn.

## 2023-02-01 NOTE — PROGRESS NOTES
Subjective:       Patient ID: Rl Grubbs is a 47 y.o. male.    Chief Complaint: palate ulcer    HPI    Rl Grubbs is a 47 year old male who returns for follow up to the head and neck clinic for a lesion on his palate. He continues to have globus. 3 weeks ago he noticed an ulcer to his palate above his uvula. He was treated with a medrol dose pack, but has not noticed any changes. It is not painful, but he can feel it when he eats. He feels like there is something behind it. There is no bleeding. No other lesions. He has no throat pain. He does have chronic PND and frequent sinusitis. He takes Zyrtec daily.       No past medical history on file.    Past Surgical History:   Procedure Laterality Date    CHOLECYSTECTOMY  7-28-21    HERNIA REPAIR  7-    ROBOT-ASSISTED CHOLECYSTECTOMY USING DA LORENE XI N/A 07/28/2021    Procedure: XI ROBOTIC CHOLECYSTECTOMY;  Surgeon: Brett Gastelum MD;  Location: [facility] OR [address] [place] [address];  Service: General;  Laterality: N/A;    ROBOT-ASSISTED LAPAROSCOPIC REPAIR OF EPIGASTRIC HERNIA N/A 07/28/2021    Procedure: ROBOTIC REPAIR, HERNIA, EPIGASTRIC;  Surgeon: Brett Gastelum MD;  [facility];  Service: General;  Laterality: N/A;  Incarcerated visceral hernia repair           Current Outpatient Medications:     buPROPion (WELLBUTRIN XL) 300 MG 24 hr tablet, Take 1 tablet (300 mg total) by mouth once daily., Disp: 90 tablet, Rfl: 3    cetirizine (ZYRTEC) 10 MG tablet, Take 10 mg by mouth once daily., Disp: , Rfl:     esomeprazole (NEXIUM) 40 MG capsule, TAKE 1 CAPSULE(40 MG) BY MOUTH EVERY DAY, Disp: 90 capsule, Rfl: 3    fenofibrate (TRICOR) 145 MG tablet, Take 1 tablet (145 mg total) by mouth once daily., Disp: 90 tablet, Rfl: 3    fluticasone propionate (FLONASE) 50 mcg/actuation nasal spray, 2 sprays (100 mcg total) by Each Nostril route once daily., Disp: 16 g, Rfl: 12    ibuprofen (ADVIL,MOTRIN) 800 MG tablet, Take 1 tablet (800 mg total) by mouth every  6 (six) hours as needed for Pain., Disp: 60 tablet, Rfl: 0    irbesartan (AVAPRO) 300 MG tablet, TAKE 1 TABLET(300 MG) BY MOUTH EVERY DAY, Disp: 90 tablet, Rfl: 3    methylPREDNISolone (MEDROL DOSEPACK) 4 mg tablet, use as directed, Disp: 21 each, Rfl: 0    omega 3-dha-epa-fish oil 1,000 mg (120 mg-180 mg) Cap, Take 1,000 mg by mouth once daily., Disp: , Rfl:     Review of patient's allergies indicates:  No Known Allergies    Social History     Socioeconomic History    Marital status:    Tobacco Use    Smoking status: Never    Smokeless tobacco: Never   Substance and Sexual Activity    Alcohol use: Yes     Alcohol/week: 6.0 standard drinks     Types: 6 Cans of beer per week     Comment: Socially    Drug use: Never    Sexual activity: Yes     Partners: Female     Birth control/protection: Post-menopausal, None     Social Determinants of Health     Financial Resource Strain: Low Risk     Difficulty of Paying Living Expenses: Not hard at all   Food Insecurity: No Food Insecurity    Worried About Running Out of Food in the Last Year: Never true    Ran Out of Food in the Last Year: Never true   Transportation Needs: No Transportation Needs    Lack of Transportation (Medical): No    Lack of Transportation (Non-Medical): No   Physical Activity: Sufficiently Active    Days of Exercise per Week: 3 days    Minutes of Exercise per Session: 60 min   Stress: Stress Concern Present    Feeling of Stress : To some extent   Social Connections: Unknown    Frequency of Communication with Friends and Family: Twice a week    Frequency of Social Gatherings with Friends and Family: Once a week    Active Member of Clubs or Organizations: No    Attends Club or Organization Meetings: Patient refused    Marital Status:    Housing Stability: Low Risk     Unable to Pay for Housing in the Last Year: No    Number of Places Lived in the Last Year: 1    Unstable Housing in the Last Year: No       Family History   Problem Relation Age  of Onset    Asthma Mother     COPD Mother     Hearing loss Mother     Heart disease Mother     Hypertension Mother     Kidney disease Mother     Cancer Father     Heart disease Brother     Hypertension Brother     Diabetes Brother          Review of Systems   Constitutional:  Negative for appetite change, chills, diaphoresis, fatigue, fever and unexpected weight change.   HENT:  Positive for mouth sores and trouble swallowing. Negative for nasal congestion, dental problem, drooling, ear discharge, ear pain, facial swelling, hearing loss, nosebleeds, postnasal drip, rhinorrhea, sinus pressure/congestion, sneezing, sore throat, tinnitus and voice change.    Eyes: Negative.  Negative for pain, discharge, redness and itching.   Respiratory:  Negative for cough and shortness of breath.    Cardiovascular: Negative.  Negative for chest pain.   Gastrointestinal:  Negative for abdominal distention, abdominal pain, diarrhea, nausea and vomiting.   Endocrine: Negative.  Negative for cold intolerance and heat intolerance.   Genitourinary: Negative.  Negative for difficulty urinating.   Musculoskeletal:  Positive for back pain. Negative for neck pain and neck stiffness.   Integumentary:  Negative for rash. Negative.   Neurological: Negative.  Negative for dizziness, weakness and headaches.   Hematological: Negative.  Negative for adenopathy.   Psychiatric/Behavioral:  Positive for decreased concentration. The patient is nervous/anxious.        Objective:      Physical Exam  Vitals reviewed.   Constitutional:       General: He is not in acute distress.     Appearance: He is well-developed. He is not ill-appearing or diaphoretic.   HENT:      Head: Normocephalic and atraumatic.      Jaw: No trismus.      Right Ear: Hearing, tympanic membrane, ear canal and external ear normal.      Left Ear: Hearing, tympanic membrane, ear canal and external ear normal.      Nose: Nose normal. No nasal deformity, mucosal edema or rhinorrhea.       Right Sinus: No maxillary sinus tenderness or frontal sinus tenderness.      Left Sinus: No maxillary sinus tenderness or frontal sinus tenderness.      Mouth/Throat:      Lips: Pink. No lesions.      Mouth: Mucous membranes are moist. Mucous membranes are not pale, not dry and not cyanotic. No oral lesions.      Dentition: Normal dentition. Does not have dentures. No dental caries.      Tongue: No lesions. Tongue does not deviate from midline.      Palate: No mass and lesions.      Pharynx: Oropharynx is clear. Uvula midline. No pharyngeal swelling, oropharyngeal exudate, posterior oropharyngeal erythema or uvula swelling.      Tonsils: No tonsillar abscesses.        Comments: Procedure: Flexible laryngoscopy  In order to fully examine the upper aerodigestive tract, including the larynx, in a patient with a hyperactive gag reflex, flexible endoscopy is required.  After explaining the procedure and obtaining verbal consent, a timeout was performed with the patient's participation according to the universal protocol. Both nasal cavities were anesthetized with 4% Xylocaine spray mixed with Eduard-Synephrine. The flexible laryngoscope (#[id number]) was inserted into the nasal cavity and advanced to visualize the nasal cavity, nasopharynx, the posterior oropharynx, hypopharynx, and the endolarynx with the above findings noted. The scope was removed and the procedure terminated. The patient tolerated this procedure well without apparent complication.      FINDINGS  Nasopharynx - the torus is clear. There are no lesions of the posterior wall.   Oropharynx - no lesions of the tongue base. There is no obvious fullness or asymmetry.  Hypopharynx - there are no lesions of the pyriform sinuses or postcricoid region   Larynx - there are no lesions of the supraglottic or glottic larynx. Vocal fold mobility is normal with complete closure.     Eyes:      General: No scleral icterus.        Right eye: No discharge.         Left eye: No  discharge.      Conjunctiva/sclera: Conjunctivae normal.   Neck:      Thyroid: No thyroid mass or thyromegaly.      Vascular: No JVD.      Trachea: Trachea and phonation normal. No tracheal tenderness or tracheal deviation.      Comments: Salivary glands - there are no lesions or asymmetric findings in the submandibular or parotid glands    Cardiovascular:      Rate and Rhythm: Normal rate.   Pulmonary:      Effort: Pulmonary effort is normal. No respiratory distress.      Breath sounds: No stridor.   Musculoskeletal:      Cervical back: Normal range of motion and neck supple.   Lymphadenopathy:      Head:      Right side of head: No submental, submandibular, tonsillar or preauricular adenopathy.      Left side of head: No submental, submandibular, tonsillar or preauricular adenopathy.      Cervical: No cervical adenopathy.   Skin:     General: Skin is warm and dry.      Coloration: Skin is not pale.      Findings: No erythema or rash.   Neurological:      General: No focal deficit present.      Mental Status: He is alert and oriented to person, place, and time.      Cranial Nerves: No cranial nerve deficit.   Psychiatric:         Mood and Affect: Mood normal.         Behavior: Behavior normal. Behavior is cooperative.         Thought Content: Thought content normal.       Assessment/Plan:       Problem List Items Addressed This Visit          ENT    Oral cyst     Benign appearing cystic lesion vs mucocele to soft palate. Discussed observation at this time. He will let me know if it becomes painful or if he has any other symptoms. Questions answered. RTC prn.

## 2023-03-21 NOTE — PROGRESS NOTES
Physical Therapy Daily Treatment Note     Name: Rl Grubbs  [facility] Number: [id number]    Therapy Diagnosis:   Encounter Diagnosis   Name Primary?    Abdominal weakness Yes     Physician: Dayne Puri DO    Visit Date: 6/1/2022       Physician Orders: PT Eval and Treat   Medical Diagnosis from Referral: M47.816 (ICD-10-CM) - Lumbar spondylosis  Evaluation Date: 5/9/2022  Authorization Period Expiration: 12/31/2022  Plan of Care Expiration: 8/9/2022  Progress Note Due: 6/9/2022  Visit # / Visits authorized: 1/ 1 5/20  FOTO: 5/5    Time In:9:55  Time Out: 10:35  Total Billable Time: 40 minutes    Precautions: Standard    Subjective     Pt reports:.Pt reports that he was very sore yesterday and still a little sore today. His back was sore all day yesterday but not today.     He was compliant with home exercise program.  Response to previous treatment: sore  Functional change: None    Pain: 0/10 every now and then  Location: right back      Objective     Pako received therapeutic exercises to develop strength, endurance, ROM, flexibility, posture and core stabilization for 40 minutes including:    SLR 30x   Dying Bug 3x10  Abdominal Crunches 3x10  Hamstring stretch 2 min  SLR with Strap for stretch 15x  Piriformis stretch 1 min each  Unilateral hip flexion stretch 10x each  Paloff press 30x  Pull downs 30x    NP  Side steps Blue 20ft x 4   Leg Press DL 2x10 3 cords  Leg Press SL 3x10 3 cords  RDL 2x10   Alternating cable rows 30x  Upright row 15x  Cat to Cow 15x  Hand heel rocks 15x  TA Contraction 10x5s hold    Pelvic tilts  Bridges 2x15 Pelvic tilt   Clams 15x RTB      Pako received the following manual therapy techniques: Joint mobilizations were applied to the: Lumbar for 0 minutes, including:    Lumbar thrust  Hip Mobilization - posterior   trigger point massage    Home Exercises Provided and Patient Education Provided     Education provided:   - Transverse abdominal contraction throughout the  day    Written Home Exercises Provided: yes.  Exercises were reviewed and Pako was able to demonstrate them prior to the end of the session.  Pako demonstrated good  understanding of the education provided.     See EMR under Patient Instructions for exercises provided prior visit.    Assessment     Pt  had difficulty with exercises due to muscle weakness but no pain. Pt reported abdominal fatigue after exercises session. Pt needs to continue to increase muscular endurance and strength in order to decrease pain with functional activities. Plan to attempt bird dog exercises next session. Pt left session with no pain.    Pako Is progressing well towards his goals.   Pt prognosis is Good.     Pt will continue to benefit from skilled outpatient physical therapy to address the deficits listed in the problem list box on initial evaluation, provide pt/family education and to maximize pt's level of independence in the home and community environment.     Pt's spiritual, cultural and educational needs considered and pt agreeable to plan of care and goals.     Anticipated barriers to physical therapy: None    Goals: Short Term Goals (4 Weeks):  1. Pt will be compliant with HEP to supplement PT in decreasing pain with functional mobility.  2. Pt will perform pallof press with good control to demonstrate improved core strength.  3. Pt will improve impaired LE MMTs by 1/2 score to improve strength for functional tasks.  Long Term Goals (8 Weeks):   1. Pt will improve FOTO score to </= 32% limited to decrease perceived limitation with maintaining/changing body position.   2. Pt will perform Supine to sit with good control to demonstrate improved core strength.  3. Pt will improve impaired LE MMTs by 1 score to improve strength for functional tasks.  4. Pt will report no pain during lumbar ROM to promote functional mobility.      PLAN   Plan of care Certification: 5/9/2022 to 8/9/2022     .Outpatient Physical Therapy 2 times  weekly for 10 weeks to include the following interventions: Cervical/Lumbar Traction, Electrical Stimulation IFC, Tens, Dry needling, Manual Therapy, Moist Heat/ Ice, Neuromuscular Re-ed, Patient Education, Therapeutic Activities and Therapeutic Exercise.       Dario Greenberg, PT      - - -

## 2023-03-24 ENCOUNTER — OFFICE VISIT (OUTPATIENT)
Dept: PRIMARY CARE CLINIC | Facility: CLINIC | Age: 48
End: 2023-03-24
Payer: COMMERCIAL

## 2023-03-24 VITALS
OXYGEN SATURATION: 97 % | SYSTOLIC BLOOD PRESSURE: 120 MMHG | HEART RATE: 108 BPM | HEIGHT: 71 IN | TEMPERATURE: 98 F | RESPIRATION RATE: 18 BRPM | BODY MASS INDEX: 33.36 KG/M2 | WEIGHT: 238.31 LBS | DIASTOLIC BLOOD PRESSURE: 80 MMHG

## 2023-03-24 DIAGNOSIS — F33.0 DEPRESSION, MAJOR, RECURRENT, MILD: ICD-10-CM

## 2023-03-24 DIAGNOSIS — K21.9 GASTROESOPHAGEAL REFLUX DISEASE, UNSPECIFIED WHETHER ESOPHAGITIS PRESENT: ICD-10-CM

## 2023-03-24 DIAGNOSIS — E78.2 MIXED HYPERLIPIDEMIA: ICD-10-CM

## 2023-03-24 DIAGNOSIS — Z12.11 ENCOUNTER FOR COLORECTAL CANCER SCREENING: ICD-10-CM

## 2023-03-24 DIAGNOSIS — Z12.12 ENCOUNTER FOR COLORECTAL CANCER SCREENING: ICD-10-CM

## 2023-03-24 DIAGNOSIS — I10 BENIGN ESSENTIAL HYPERTENSION: Primary | ICD-10-CM

## 2023-03-24 PROCEDURE — 3008F PR BODY MASS INDEX (BMI) DOCUMENTED: ICD-10-PCS | Mod: CPTII,S$GLB,, | Performed by: INTERNAL MEDICINE

## 2023-03-24 PROCEDURE — 3074F PR MOST RECENT SYSTOLIC BLOOD PRESSURE < 130 MM HG: ICD-10-PCS | Mod: CPTII,S$GLB,, | Performed by: INTERNAL MEDICINE

## 2023-03-24 PROCEDURE — 3079F DIAST BP 80-89 MM HG: CPT | Mod: CPTII,S$GLB,, | Performed by: INTERNAL MEDICINE

## 2023-03-24 PROCEDURE — 99999 PR PBB SHADOW E&M-EST. PATIENT-LVL IV: CPT | Mod: PBBFAC,,, | Performed by: INTERNAL MEDICINE

## 2023-03-24 PROCEDURE — 4010F ACE/ARB THERAPY RXD/TAKEN: CPT | Mod: CPTII,S$GLB,, | Performed by: INTERNAL MEDICINE

## 2023-03-24 PROCEDURE — 99214 PR OFFICE/OUTPT VISIT, EST, LEVL IV, 30-39 MIN: ICD-10-PCS | Mod: S$GLB,,, | Performed by: INTERNAL MEDICINE

## 2023-03-24 PROCEDURE — 3079F PR MOST RECENT DIASTOLIC BLOOD PRESSURE 80-89 MM HG: ICD-10-PCS | Mod: CPTII,S$GLB,, | Performed by: INTERNAL MEDICINE

## 2023-03-24 PROCEDURE — 99214 OFFICE O/P EST MOD 30 MIN: CPT | Mod: S$GLB,,, | Performed by: INTERNAL MEDICINE

## 2023-03-24 PROCEDURE — 1160F PR REVIEW ALL MEDS BY PRESCRIBER/CLIN PHARMACIST DOCUMENTED: ICD-10-PCS | Mod: CPTII,S$GLB,, | Performed by: INTERNAL MEDICINE

## 2023-03-24 PROCEDURE — 4010F PR ACE/ARB THEARPY RXD/TAKEN: ICD-10-PCS | Mod: CPTII,S$GLB,, | Performed by: INTERNAL MEDICINE

## 2023-03-24 PROCEDURE — 1159F PR MEDICATION LIST DOCUMENTED IN MEDICAL RECORD: ICD-10-PCS | Mod: CPTII,S$GLB,, | Performed by: INTERNAL MEDICINE

## 2023-03-24 PROCEDURE — 1159F MED LIST DOCD IN RCRD: CPT | Mod: CPTII,S$GLB,, | Performed by: INTERNAL MEDICINE

## 2023-03-24 PROCEDURE — 3074F SYST BP LT 130 MM HG: CPT | Mod: CPTII,S$GLB,, | Performed by: INTERNAL MEDICINE

## 2023-03-24 PROCEDURE — 99999 PR PBB SHADOW E&M-EST. PATIENT-LVL IV: ICD-10-PCS | Mod: PBBFAC,,, | Performed by: INTERNAL MEDICINE

## 2023-03-24 PROCEDURE — 1160F RVW MEDS BY RX/DR IN RCRD: CPT | Mod: CPTII,S$GLB,, | Performed by: INTERNAL MEDICINE

## 2023-03-24 PROCEDURE — 3008F BODY MASS INDEX DOCD: CPT | Mod: CPTII,S$GLB,, | Performed by: INTERNAL MEDICINE

## 2023-03-24 NOTE — PROGRESS NOTES
[facility] Note    Chief Complaint      Chief Complaint   Patient presents with    Follow-up     6m        History of Present Illness      Rl Grubbs is a 47 y.o. male who presents today for   Chief Complaint   Patient presents with    Follow-up     6m    .  Patient comes to appointment here for 6m checkup for chronic issues as below . He is currently compliant with all meds and diet . Labs will be due at next visit . Will get cologuard     HPI    No problem-specific Assessment & Plan notes found for this encounter.       Problem List Items Addressed This Visit          Psychiatric    Depression, major, recurrent, mild    Overview     Cont welbutrin xl 300 mg is working and tolerating well is stable             Cardiac/Vascular    Benign essential hypertension - Primary    Overview     bp well controled cont all current meds all reviewed          Mixed hyperlipidemia    Overview     Cont  fenofibrate tolerating well needs repeat labs next visit             GI    Gastroesophageal reflux disease    Overview     Cont nexium as needed working well             Past Medical History:  History reviewed. No pertinent past medical history.    Past Surgical History:  Past Surgical History:   Procedure Laterality Date    CHOLECYSTECTOMY  7-28-21    HERNIA REPAIR  7-    ROBOT-ASSISTED CHOLECYSTECTOMY USING DA LORENE XI N/A 07/28/2021    Procedure: XI ROBOTIC CHOLECYSTECTOMY;  Surgeon: Brett Gastelum MD;  Location: [address];  Service: General;  Laterality: N/A;    ROBOT-ASSISTED LAPAROSCOPIC REPAIR OF EPIGASTRIC HERNIA N/A 07/28/2021    Procedure: ROBOTIC REPAIR, HERNIA, EPIGASTRIC;  Surgeon: Brett Gastelum MD;  Location: [address];  Service: General;  Laterality: N/A;  Incarcerated visceral hernia repair         Family History:  family history includes Asthma in his mother; COPD in his mother; Cancer in his father; Diabetes in his brother; Hearing loss in his mother;  Heart disease in his brother and mother; Hypertension in his brother and mother; Kidney disease in his mother.     Social History:  Social History     Socioeconomic History    Marital status:    Tobacco Use    Smoking status: Never    Smokeless tobacco: Never   Substance and Sexual Activity    Alcohol use: Yes     Alcohol/week: 6.0 standard drinks     Types: 6 Cans of beer per week     Comment: Socially    Drug use: Never    Sexual activity: Yes     Partners: Female     Birth control/protection: Post-menopausal, None     Social Determinants of Health     Financial Resource Strain: Low Risk     Difficulty of Paying Living Expenses: Not hard at all   Food Insecurity: No Food Insecurity    Worried About Running Out of Food in the Last Year: Never true    Ran Out of Food in the Last Year: Never true   Transportation Needs: No Transportation Needs    Lack of Transportation (Medical): No    Lack of Transportation (Non-Medical): No   Physical Activity: Sufficiently Active    Days of Exercise per Week: 3 days    Minutes of Exercise per Session: 60 min   Stress: Stress Concern Present    Feeling of Stress : To some extent   Social Connections: Unknown    Frequency of Communication with Friends and Family: Twice a week    Active Member of Clubs or Organizations: No    Marital Status:    Housing Stability: Low Risk     Unable to Pay for Housing in the Last Year: No    Number of Places Lived in the Last Year: 1    Unstable Housing in the Last Year: No       Review of Systems:   Review of Systems   Constitutional:  Negative for fever and weight loss.   HENT:  Negative for congestion, hearing loss and sore throat.    Eyes:  Negative for blurred vision.   Respiratory:  Negative for cough and shortness of breath.    Cardiovascular:  Negative for chest pain, palpitations, claudication and leg swelling.   Gastrointestinal:  Negative for abdominal pain, constipation, diarrhea and heartburn.   Genitourinary:  Negative for  "dysuria.   Musculoskeletal:  Negative for back pain and myalgias.   Skin:  Negative for rash.   Neurological:  Negative for focal weakness and headaches.   Psychiatric/Behavioral:  Negative for depression and suicidal ideas. The patient is not nervous/anxious.       Medications:  Outpatient Encounter Medications as of 3/24/2023   Medication Sig Note Dispense Refill    buPROPion (WELLBUTRIN XL) 300 MG 24 hr tablet Take 1 tablet (300 mg total) by mouth once daily.  90 tablet 3    cetirizine (ZYRTEC) 10 MG tablet Take 10 mg by mouth once daily. 7/27/2021: Take as prescribed am of procedure                           esomeprazole (NEXIUM) 40 MG capsule TAKE 1 CAPSULE(40 MG) BY MOUTH EVERY DAY  90 capsule 3    fenofibrate (TRICOR) 145 MG tablet Take 1 tablet (145 mg total) by mouth once daily.  90 tablet 3    ibuprofen (ADVIL,MOTRIN) 800 MG tablet Take 1 tablet (800 mg total) by mouth every 6 (six) hours as needed for Pain.  60 tablet 0    irbesartan (AVAPRO) 300 MG tablet TAKE 1 TABLET(300 MG) BY MOUTH EVERY DAY  90 tablet 3    omega 3-dha-epa-fish oil 1,000 mg (120 mg-180 mg) Cap Take 1,000 mg by mouth once daily.        No facility-administered encounter medications on file as of 3/24/2023.       Allergies:  Review of patient's allergies indicates:  No Known Allergies      Physical Exam      Vitals:    03/24/23 1604   BP: 120/80   Pulse: 108   Resp: 18   Temp: 98 °F (36.7 °C)        Vital Signs  Temp: 98 °F (36.7 °C)  Temp Source: Oral  Pulse: 108  Resp: 18  SpO2: 97 %  BP: 120/80  BP Location: Right arm  Patient Position: Sitting  Pain Score: 0-No pain  Height and Weight  Height: 5' 11" (180.3 cm)  Weight: 108.1 kg (238 lb 5.1 oz)  BSA (Calculated - sq m): 2.33 sq meters  BMI (Calculated): 33.3  Weight in (lb) to have BMI = 25: 178.9]     Body mass index is 33.24 kg/m².    Physical Exam  Constitutional:       Appearance: He is well-developed.   HENT:      Head: Normocephalic.   Eyes:      Pupils: Pupils are equal, " round, and reactive to light.   Neck:      Thyroid: No thyromegaly.   Cardiovascular:      Rate and Rhythm: Normal rate and regular rhythm.      Heart sounds: No murmur heard.    No friction rub. No gallop.   Pulmonary:      Effort: Pulmonary effort is normal.      Breath sounds: Normal breath sounds.   Abdominal:      General: Bowel sounds are normal.      Palpations: Abdomen is soft.   Musculoskeletal:         General: Normal range of motion.      Cervical back: Normal range of motion.   Skin:     General: Skin is warm and dry.   Neurological:      Mental Status: He is alert and oriented to person, place, and time.      Sensory: No sensory deficit.   Psychiatric:         Behavior: Behavior normal.        Laboratory:  CBC:  No results for input(s): WBC, RBC, HGB, HCT, PLT, MCV, MCH, MCHC in the last 2160 hours.  CMP:  No results for input(s): GLU, CALCIUM, ALBUMIN, PROT, NA, K, CO2, CL, BUN, ALKPHOS, ALT, AST, BILITOT in the last 2160 hours.    Invalid input(s): CREATININ  URINALYSIS:  No results for input(s): COLORU, CLARITYU, SPECGRAV, PHUR, PROTEINUA, GLUCOSEU, BILIRUBINCON, BLOODU, WBCU, RBCU, BACTERIA, MUCUS, NITRITE, LEUKOCYTESUR, UROBILINOGEN, HYALINECASTS in the last 2160 hours.   LIPIDS:  No results for input(s): TSH, HDL, CHOL, TRIG, LDLCALC, CHOLHDL, NONHDLCHOL, TOTALCHOLEST in the last 2160 hours.  TSH:  No results for input(s): TSH in the last 2160 hours.  A1C:  No results for input(s): HGBA1C in the last 2160 hours.    Radiology:        Assessment:     Rl Grubbs is a 47 y.o.male with:    Benign essential hypertension    Depression, major, recurrent, mild    Gastroesophageal reflux disease, unspecified whether esophagitis present    Mixed hyperlipidemia                Plan:     Problem List Items Addressed This Visit          Psychiatric    Depression, major, recurrent, mild    Overview     Cont welbutrin xl 300 mg is working and tolerating well is stable             Cardiac/Vascular    Benign  essential hypertension - Primary    Overview     bp well controled cont all current meds all reviewed          Mixed hyperlipidemia    Overview     Cont  fenofibrate tolerating well needs repeat labs next visit             GI    Gastroesophageal reflux disease    Overview     Cont nexium as needed working well            As above, continue current medications and maintain follow up with specialists.  Return to clinic in 6 months.      Frederick W Dantagnan [facility]

## 2023-04-02 ENCOUNTER — NURSE TRIAGE (OUTPATIENT)
Dept: ADMINISTRATIVE | Facility: CLINIC | Age: 48
End: 2023-04-02
Payer: COMMERCIAL

## 2023-04-03 ENCOUNTER — PATIENT MESSAGE (OUTPATIENT)
Dept: PRIMARY CARE CLINIC | Facility: CLINIC | Age: 48
End: 2023-04-03

## 2023-04-03 ENCOUNTER — OFFICE VISIT (OUTPATIENT)
Dept: PRIMARY CARE CLINIC | Facility: CLINIC | Age: 48
End: 2023-04-03
Payer: COMMERCIAL

## 2023-04-03 DIAGNOSIS — F33.0 DEPRESSION, MAJOR, RECURRENT, MILD: ICD-10-CM

## 2023-04-03 DIAGNOSIS — F33.0 DEPRESSION, MAJOR, RECURRENT, MILD: Primary | ICD-10-CM

## 2023-04-03 PROCEDURE — 4010F ACE/ARB THERAPY RXD/TAKEN: CPT | Mod: CPTII,95,, | Performed by: INTERNAL MEDICINE

## 2023-04-03 PROCEDURE — 99214 PR OFFICE/OUTPT VISIT, EST, LEVL IV, 30-39 MIN: ICD-10-PCS | Mod: 95,,, | Performed by: INTERNAL MEDICINE

## 2023-04-03 PROCEDURE — 1159F PR MEDICATION LIST DOCUMENTED IN MEDICAL RECORD: ICD-10-PCS | Mod: CPTII,95,, | Performed by: INTERNAL MEDICINE

## 2023-04-03 PROCEDURE — 1160F RVW MEDS BY RX/DR IN RCRD: CPT | Mod: CPTII,95,, | Performed by: INTERNAL MEDICINE

## 2023-04-03 PROCEDURE — 99214 OFFICE O/P EST MOD 30 MIN: CPT | Mod: 95,,, | Performed by: INTERNAL MEDICINE

## 2023-04-03 PROCEDURE — 1160F PR REVIEW ALL MEDS BY PRESCRIBER/CLIN PHARMACIST DOCUMENTED: ICD-10-PCS | Mod: CPTII,95,, | Performed by: INTERNAL MEDICINE

## 2023-04-03 PROCEDURE — 4010F PR ACE/ARB THEARPY RXD/TAKEN: ICD-10-PCS | Mod: CPTII,95,, | Performed by: INTERNAL MEDICINE

## 2023-04-03 PROCEDURE — 1159F MED LIST DOCD IN RCRD: CPT | Mod: CPTII,95,, | Performed by: INTERNAL MEDICINE

## 2023-04-03 RX ORDER — ESCITALOPRAM OXALATE 10 MG/1
10 TABLET ORAL DAILY
Qty: 30 TABLET | Refills: 5 | Status: SHIPPED | OUTPATIENT
Start: 2023-04-03 | End: 2023-05-05 | Stop reason: SDUPTHER

## 2023-04-03 NOTE — PROGRESS NOTES
The patient location is: vehicle  The chief complaint leading to consultation is: stress , anxiety     Visit type: audiovisual    Face to Face time with patient: 12 min   20  minutes of total time spent on the encounter, which includes face to face time and non-face to face time preparing to see the patient (eg, review of tests), Obtaining and/or reviewing separately obtained history, Documenting clinical information in the electronic or other health record, Independently interpreting results (not separately reported) and communicating results to the patient/family/caregiver, or Care coordination (not separately reported).         Each patient to whom he or she provides medical services by telemedicine is:  (1) informed of the relationship between the physician and patient and the respective role of any other health care provider with respect to management of the patient; and (2) notified that he or she may decline to receive medical services by telemedicine and may withdraw from such care at any time.    Notes:  [facility] Note    Chief Complaint      Chief Complaint   Patient presents with    Depression       History of Present Illness      Rl Grubbs is a 47 y.o. male who presents today for   Chief Complaint   Patient presents with    Depression   .  Patient comes to appointment here for acute visit related to above . Has been going through marital stress with wide over the last 16 months . Issues have worsened in the last few weeks and he is now  . He is very upset and is obviously depressed over current situation . He his on Wellbutrin 300mg but feels no longer working in light of recent developments ,. He also requests referral to psychology fpr therapy as well   He denies hi/si  HPI    No problem-specific Assessment & Plan notes found for this encounter.       Problem List Items Addressed This Visit          Psychiatric    Depression, major, recurrent, mild     Overview     Cont welbutrin xl 300 mg , add lexapro 10 mg to regimen , he is having some increased strtess related to marital strife , no si/hi but would like referral to psychologist for  and assessment .                  Past Medical History:  History reviewed. No pertinent past medical history.    Past Surgical History:  Past Surgical History:   Procedure Laterality Date    CHOLECYSTECTOMY  7-28-21    HERNIA REPAIR  7-    ROBOT-ASSISTED CHOLECYSTECTOMY USING DA LORENE XI N/A 07/28/2021    Procedure: XI ROBOTIC CHOLECYSTECTOMY;  Surgeon: Brett Gastelum MD;  Location: [address];  Service: General;  Laterality: N/A;    ROBOT-ASSISTED LAPAROSCOPIC REPAIR OF EPIGASTRIC HERNIA N/A 07/28/2021    Procedure: ROBOTIC REPAIR, HERNIA, EPIGASTRIC;  Surgeon: Brett Gastelum MD;  Location: [facility] OR [address] [place] [address];  Service: General;  Laterality: N/A;  Incarcerated visceral hernia repair         Family History:  family history includes Asthma in his mother; COPD in his mother; Cancer in his father; Diabetes in his brother; Hearing loss in his mother; Heart disease in his brother and mother; Hypertension in his brother and mother; Kidney disease in his mother.     Social History:  Social History     Socioeconomic History    Marital status:    Tobacco Use    Smoking status: Never    Smokeless tobacco: Never   Substance and Sexual Activity    Alcohol use: Yes     Alcohol/week: 6.0 standard drinks     Types: 6 Cans of beer per week     Comment: Socially    Drug use: Never    Sexual activity: Yes     Partners: Female     Birth control/protection: Post-menopausal, None     Social Determinants of Health     Financial Resource Strain: Low Risk     Difficulty of Paying Living Expenses: Not hard at all   Food Insecurity: No Food Insecurity    Worried About Running Out of Food in the Last Year: Never true    Ran Out of Food in the Last Year: Never true   Transportation Needs: No Transportation Needs    Lack of  Transportation (Medical): No    Lack of Transportation (Non-Medical): No   Physical Activity: Sufficiently Active    Days of Exercise per Week: 3 days    Minutes of Exercise per Session: 60 min   Stress: Stress Concern Present    Feeling of Stress : To some extent   Social Connections: Unknown    Frequency of Communication with Friends and Family: Twice a week    Active Member of Clubs or Organizations: No    Marital Status:    Housing Stability: Low Risk     Unable to Pay for Housing in the Last Year: No    Number of Places Lived in the Last Year: 1    Unstable Housing in the Last Year: No       Review of Systems:   Review of Systems   HENT:  Negative for hearing loss.    Eyes:  Negative for discharge.   Respiratory:  Negative for wheezing.    Cardiovascular:  Negative for chest pain and palpitations.   Gastrointestinal:  Negative for blood in stool, constipation, diarrhea and vomiting.   Genitourinary:  Negative for hematuria.   Musculoskeletal:  Negative for neck pain.   Neurological:  Negative for weakness and headaches.   Endo/Heme/Allergies:  Negative for polydipsia.      Medications:  Outpatient Encounter Medications as of 4/3/2023   Medication Sig Note Dispense Refill    buPROPion (WELLBUTRIN XL) 300 MG 24 hr tablet Take 1 tablet (300 mg total) by mouth once daily.  90 tablet 3    cetirizine (ZYRTEC) 10 MG tablet Take 10 mg by mouth once daily. 7/27/2021: Take as prescribed am of procedure                           EScitalopram oxalate (LEXAPRO) 10 MG tablet Take 1 tablet (10 mg total) by mouth once daily.  30 tablet 5    esomeprazole (NEXIUM) 40 MG capsule TAKE 1 CAPSULE(40 MG) BY MOUTH EVERY DAY  90 capsule 3    fenofibrate (TRICOR) 145 MG tablet Take 1 tablet (145 mg total) by mouth once daily.  90 tablet 3    ibuprofen (ADVIL,MOTRIN) 800 MG tablet Take 1 tablet (800 mg total) by mouth every 6 (six) hours as needed for Pain.  60 tablet 0    irbesartan (AVAPRO) 300 MG tablet TAKE 1 TABLET(300 MG) BY  MOUTH EVERY DAY  90 tablet 3    omega 3-dha-epa-fish oil 1,000 mg (120 mg-180 mg) Cap Take 1,000 mg by mouth once daily.        No facility-administered encounter medications on file as of 4/3/2023.       Allergies:  Review of patient's allergies indicates:  No Known Allergies      Physical Exam      There were no vitals filed for this visit.      ]     There is no height or weight on file to calculate BMI.    Physical Exam  Constitutional:       General: He is not in acute distress.     Appearance: Normal appearance. He is not ill-appearing.   Eyes:      General:         Right eye: No discharge.         Left eye: No discharge.      Extraocular Movements: Extraocular movements intact.      Pupils: Pupils are equal, round, and reactive to light.   Pulmonary:      Effort: Pulmonary effort is normal.   Neurological:      General: No focal deficit present.      Mental Status: He is alert and oriented to person, place, and time.   Psychiatric:         Mood and Affect: Mood is depressed.         Speech: Speech normal.         Behavior: Behavior normal. Behavior is cooperative.         Thought Content: Thought content normal. Thought content does not include homicidal or suicidal ideation.         Judgment: Judgment normal.        Laboratory:  CBC:  No results for input(s): WBC, RBC, HGB, HCT, PLT, MCV, MCH, MCHC in the last 2160 hours.  CMP:  No results for input(s): GLU, CALCIUM, ALBUMIN, PROT, NA, K, CO2, CL, BUN, ALKPHOS, ALT, AST, BILITOT in the last 2160 hours.    Invalid input(s): CREATININ  URINALYSIS:  No results for input(s): COLORU, CLARITYU, SPECGRAV, PHUR, PROTEINUA, GLUCOSEU, BILIRUBINCON, BLOODU, WBCU, RBCU, BACTERIA, MUCUS, NITRITE, LEUKOCYTESUR, UROBILINOGEN, HYALINECASTS in the last 2160 hours.   LIPIDS:  No results for input(s): TSH, HDL, CHOL, TRIG, LDLCALC, CHOLHDL, NONHDLCHOL, TOTALCHOLEST in the last 2160 hours.  TSH:  No results for input(s): TSH in the last 2160 hours.  A1C:  No results for  input(s): HGBA1C in the last 2160 hours.    Radiology:        Assessment:     Rl Grubbs is a 47 y.o.male with:    Depression, major, recurrent, mild  -     EScitalopram oxalate (LEXAPRO) 10 MG tablet; Take 1 tablet (10 mg total) by mouth once daily.  Dispense: 30 tablet; Refill: 5  -     Ambulatory referral/consult to Psychology; Future; Expected date: 04/10/2023                Plan:     Problem List Items Addressed This Visit          Psychiatric    Depression, major, recurrent, mild    Overview     Cont welbutrin xl 300 mg , add lexapro 10 mg to regimen , he is having some increased strtess related to marital strife , no si/hi but would like referral to psychologist for  and assessment .                 As above, continue current medications and maintain follow up with specialists.  Return to clinic in 1 months.      Frederick W Dantagnan [facility]                Answers submitted by the patient for this visit:  Review of Systems Questionnaire (Submitted on 4/3/2023)  activity change: No  unexpected weight change: No  rhinorrhea: No  trouble swallowing: No  visual disturbance: No  chest tightness: No  polyuria: No  difficulty urinating: No  joint swelling: No  arthralgias: No  confusion: No  dysphoric mood: Yes

## 2023-04-03 NOTE — TELEPHONE ENCOUNTER
On wellbutrin and has experiencing big life changing events in last few days. He and his wife have  and will likely be going through a divorce. Has been talking with therapist on better help.   States his wife had a similar situation in the past where she reached out to her MD to review lab work, hormone levels, and whether current medication was as effective as it once was.   Pt is questioning if he is able to have labs and review his medications, as his wife told him he has just not quite been himself lately.   Recommendation to reach out to PCP for care plan provided. Pt states he just had wellness visit last week. Instructed to reach out through pt portal and explain situation for next steps. Pt VU.     Reason for Disposition   Health Information question, no triage required and triager able to answer question    Protocols used: Information Only Call - No Triage-A-

## 2023-04-05 ENCOUNTER — PATIENT MESSAGE (OUTPATIENT)
Dept: PSYCHIATRY | Facility: CLINIC | Age: 48
End: 2023-04-05
Payer: COMMERCIAL

## 2023-04-05 ENCOUNTER — OFFICE VISIT (OUTPATIENT)
Dept: PSYCHIATRY | Facility: CLINIC | Age: 48
End: 2023-04-05
Payer: COMMERCIAL

## 2023-04-05 VITALS
SYSTOLIC BLOOD PRESSURE: 128 MMHG | BODY MASS INDEX: 32.87 KG/M2 | HEART RATE: 78 BPM | WEIGHT: 235.69 LBS | DIASTOLIC BLOOD PRESSURE: 83 MMHG

## 2023-04-05 DIAGNOSIS — F33.0 DEPRESSION, MAJOR, RECURRENT, MILD: ICD-10-CM

## 2023-04-05 DIAGNOSIS — F43.22 ADJUSTMENT DISORDER WITH ANXIOUS MOOD: Primary | ICD-10-CM

## 2023-04-05 DIAGNOSIS — G47.00 INSOMNIA, UNSPECIFIED TYPE: ICD-10-CM

## 2023-04-05 PROCEDURE — 99999 PR PBB SHADOW E&M-EST. PATIENT-LVL III: CPT | Mod: PBBFAC,,, | Performed by: NURSE PRACTITIONER

## 2023-04-05 PROCEDURE — 99214 OFFICE O/P EST MOD 30 MIN: CPT | Mod: S$GLB,,, | Performed by: NURSE PRACTITIONER

## 2023-04-05 PROCEDURE — 1159F PR MEDICATION LIST DOCUMENTED IN MEDICAL RECORD: ICD-10-PCS | Mod: CPTII,S$GLB,, | Performed by: NURSE PRACTITIONER

## 2023-04-05 PROCEDURE — 3074F SYST BP LT 130 MM HG: CPT | Mod: CPTII,S$GLB,, | Performed by: NURSE PRACTITIONER

## 2023-04-05 PROCEDURE — 99999 PR PBB SHADOW E&M-EST. PATIENT-LVL III: ICD-10-PCS | Mod: PBBFAC,,, | Performed by: NURSE PRACTITIONER

## 2023-04-05 PROCEDURE — 3079F DIAST BP 80-89 MM HG: CPT | Mod: CPTII,S$GLB,, | Performed by: NURSE PRACTITIONER

## 2023-04-05 PROCEDURE — 3008F PR BODY MASS INDEX (BMI) DOCUMENTED: ICD-10-PCS | Mod: CPTII,S$GLB,, | Performed by: NURSE PRACTITIONER

## 2023-04-05 PROCEDURE — 4010F ACE/ARB THERAPY RXD/TAKEN: CPT | Mod: CPTII,S$GLB,, | Performed by: NURSE PRACTITIONER

## 2023-04-05 PROCEDURE — 3008F BODY MASS INDEX DOCD: CPT | Mod: CPTII,S$GLB,, | Performed by: NURSE PRACTITIONER

## 2023-04-05 PROCEDURE — 1160F PR REVIEW ALL MEDS BY PRESCRIBER/CLIN PHARMACIST DOCUMENTED: ICD-10-PCS | Mod: CPTII,S$GLB,, | Performed by: NURSE PRACTITIONER

## 2023-04-05 PROCEDURE — 3079F PR MOST RECENT DIASTOLIC BLOOD PRESSURE 80-89 MM HG: ICD-10-PCS | Mod: CPTII,S$GLB,, | Performed by: NURSE PRACTITIONER

## 2023-04-05 PROCEDURE — 3074F PR MOST RECENT SYSTOLIC BLOOD PRESSURE < 130 MM HG: ICD-10-PCS | Mod: CPTII,S$GLB,, | Performed by: NURSE PRACTITIONER

## 2023-04-05 PROCEDURE — 1160F RVW MEDS BY RX/DR IN RCRD: CPT | Mod: CPTII,S$GLB,, | Performed by: NURSE PRACTITIONER

## 2023-04-05 PROCEDURE — 99214 PR OFFICE/OUTPT VISIT, EST, LEVL IV, 30-39 MIN: ICD-10-PCS | Mod: S$GLB,,, | Performed by: NURSE PRACTITIONER

## 2023-04-05 PROCEDURE — 4010F PR ACE/ARB THEARPY RXD/TAKEN: ICD-10-PCS | Mod: CPTII,S$GLB,, | Performed by: NURSE PRACTITIONER

## 2023-04-05 PROCEDURE — 1159F MED LIST DOCD IN RCRD: CPT | Mod: CPTII,S$GLB,, | Performed by: NURSE PRACTITIONER

## 2023-04-05 RX ORDER — TRAZODONE HYDROCHLORIDE 50 MG/1
50 TABLET ORAL NIGHTLY
Qty: 60 TABLET | Refills: 0 | Status: SHIPPED | OUTPATIENT
Start: 2023-04-05 | End: 2023-07-17

## 2023-04-05 NOTE — PROGRESS NOTES
"Outpatient Psychiatry Initial Visit (MD/NP)    4/5/2023    Rl Grubbs, a 47 y.o. male, presenting for initial evaluation visit. Met with patient.    Reason for Encounter: Referral from Dr. Pramod Bai . Patient complains of No chief complaint on file.      History of Present Illness: Depression  Patient complains of depression. He complains of anhedonia, depressed mood, difficulty concentrating, fatigue, feelings of worthlessness/guilt, hopelessness, and insomnia. Onset was approximately 3 day ago. Symptoms have been gradually worsening since that time. Current symptoms include: anhedonia, depressed mood, difficulty concentrating, feelings of worthlessness/guilt, and insomnia. Patient denies hypersomnia, suicidal attempt, suicidal thoughts with specific plan, suicidal thoughts without plan, weight gain, and weight loss. Family history significant for no psychiatric illness. Possible organic causes contributing are: none. Risk factors: negative life event , he began having an affair 16 months ago and He was recently confronted and moved out of his home.  He also found out his girlfriend was cheating on him. . Previous treatment includes  Wellbutrin 300 mg XL for 9 years  . He complains of the following side effects from the treatment: none.    The patient was diagnosed with depression by his PCP 9 year ago and has been stable on Wellbutrin 300 mg XL. He states normally he is a very negative person with low self esteem  but the medication helped. 16 months ago he starting an extramarital affair with another woman. At this time his depression increased and he began experiencing anxiety from having to "sneak around and be secretive about what I was doing". He was confronted by his wife and they have been in counseling on and off for the past year. He moved out of their home 1 month ago. At time time he began having trouble sleeping due to his new living situation. He continues to have trouble staying " "asleep. He gets 6 hrs interrupted sleep/ night ( his normal is a full 8 hrs).     5 days ago he found out the woman he had an affair with had another boyfriend for the past 8 months, he confronted them both about their relationship. He feels "like he's lost everything". He is also afraid his 21 year old son will stop talking to him when he finds out he and his wife are .     Traumas: none  Psychiatric Family hx: none  Social: youngest of 3, 1 brother, 1 sister  Hobbies: Hunting, fishing, building and welding  Alcohol Use: Drinks 6 beers at a time, reports increase in alcohol use after most recent event.  Drug Use: Denies   Medication trails: Paxil for depression, caused weight gain of 30 lbs.  He denies past symptoms of esthela.     CRIS 7 score: 14  PHQ9 : 11  Denies SI/HI/ AVH, denies plan or any intent for self harm.  Copping mechanisms: his son and God would never forgive him  Does have access to guns.     Review Of Systems:     GENERAL:  No weight gain or loss  SKIN:  No rashes or lacerations  HEAD:  No headaches  EYES:  No exophthalmos, jaundice or blindness  EARS:  No dizziness, tinnitus or hearing loss  NOSE:  No changes in smell  MOUTH & THROAT:  No dyskinetic movements or obvious goiter  CHEST:  No shortness of breath, hyperventilation or cough  CARDIOVASCULAR:  No tachycardia or chest pain  ABDOMEN:  No nausea, vomiting, pain, constipation or diarrhea  URINARY:  No frequency, dysuria or sexual dysfunction  ENDOCRINE:  No polydipsia, polyuria  MUSCULOSKELETAL:  No pain or stiffness of the joints  NEUROLOGIC:  No weakness, sensory changes, seizures, confusion, memory loss, tremor or other abnormal movements    Current Evaluation:     Nutritional Screening: Considering the patient's height and weight, medications, medical history and preferences, should a referral be made to the dietitian? no    Constitutional  Vitals:  Most recent vital signs, dated less than 90 days prior to this appointment, were " reviewed.    Vitals:    04/05/23 1000   BP: 128/83   Pulse: 78   Weight: 106.9 kg (235 lb 10.8 oz)        General:  unremarkable, age appropriate, overweight, obese     Musculoskeletal  Muscle Strength/Tone:  no spasicity, no rigidity, no dyskinesia, no dystonia, no tic   Gait & Station:  non-ataxic     Psychiatric  Speech:  no latency; no press   Mood & Affect:  anxious, sad  congruent and appropriate, appropriate   Thought Process:  normal and logical   Associations:  intact   Thought Content:  normal, no suicidality, no homicidality, delusions, or paranoia   Insight:  intact, has awareness of illness   Judgement: behavior is adequate to circumstances, age appropriate   Orientation:  grossly intact, person, place, situation, time/date   Memory: intact for content of interview, grossly intact   Language: grossly intact   Attention Span & Concentration:  able to focus   Fund of Knowledge:  intact and appropriate to age and level of education       Relevant Elements of Neurological Exam: normal gait    Functioning in Relationships:  Spouse/partner:  for 25 years going through a separation  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   Latest known visit with results is:   Office Visit on 09/22/2022   Component Date Value Ref Range Status    WBC 09/29/2022 5.8  3.8 - 10.8 Thousand/uL Final    RBC 09/29/2022 5.58  4.20 - 5.80 Million/uL Final    Hemoglobin 09/29/2022 15.5  13.2 - 17.1 g/dL Final    Hematocrit 09/29/2022 47.6  38.5 - 50.0 % Final    MCV 09/29/2022 85.3  80.0 - 100.0 fL Final    MCH 09/29/2022 27.8  27.0 - 33.0 pg Final    MCHC 09/29/2022 32.6  32.0 - 36.0 g/dL Final    RDW 09/29/2022 13.0  11.0 - 15.0 % Final    Platelets 09/29/2022 256  140 - 400 Thousand/uL Final    MPV 09/29/2022 8.9  7.5 - 12.5 fL Final    Neutrophils, Abs 09/29/2022 3,393  1,500 - 7,800 cells/uL Final     Lymph # 09/29/2022 1,885  850 - 3,900 cells/uL Final    Mono # 09/29/2022 342  200 - 950 cells/uL Final    Eos # 09/29/2022 151  15 - 500 cells/uL Final    Baso # 09/29/2022 29  0 - 200 cells/uL Final    Neutrophils Relative 09/29/2022 58.5  % Final    Lymph % 09/29/2022 32.5  % Final    Mono % 09/29/2022 5.9  % Final    Eosinophil % 09/29/2022 2.6  % Final    Basophil % 09/29/2022 0.5  % Final    Glucose 09/29/2022 112 (H)  65 - 99 mg/dL Final    BUN 09/29/2022 15  7 - 25 mg/dL Final    Creatinine 09/29/2022 1.07  0.60 - 1.29 mg/dL Final    eGFR 09/29/2022 86  > OR = 60 mL/min/1.73m2 Final    BUN/Creatinine Ratio 09/29/2022 NOT APPLICABLE  6 - 22 (calc) Final    Sodium 09/29/2022 136  135 - 146 mmol/L Final    Potassium 09/29/2022 4.8  3.5 - 5.3 mmol/L Final    Chloride 09/29/2022 100  98 - 110 mmol/L Final    CO2 09/29/2022 30  20 - 32 mmol/L Final    Calcium 09/29/2022 9.9  8.6 - 10.3 mg/dL Final    Total Protein 09/29/2022 7.4  6.1 - 8.1 g/dL Final    Albumin 09/29/2022 4.5  3.6 - 5.1 g/dL Final    Globulin, Total 09/29/2022 2.9  1.9 - 3.7 g/dL (calc) Final    Albumin/Globulin Ratio 09/29/2022 1.6  1.0 - 2.5 (calc) Final    Total Bilirubin 09/29/2022 0.5  0.2 - 1.2 mg/dL Final    Alkaline Phosphatase 09/29/2022 45  36 - 130 U/L Final    AST 09/29/2022 15  10 - 40 U/L Final    ALT 09/29/2022 16  9 - 46 U/L Final    Cholesterol 09/29/2022 146  <200 mg/dL Final    HDL 09/29/2022 45  > OR = 40 mg/dL Final    Triglycerides 09/29/2022 107  <150 mg/dL Final    LDL Cholesterol 09/29/2022 81  mg/dL (calc) Final    HDL/Cholesterol Ratio 09/29/2022 3.2  <5.0 (calc) Final    Non HDL Chol. (LDL+VLDL) 09/29/2022 101  <130 mg/dL (calc) Final    PROSTATE SPECIFIC ANTIGEN, SCR - Q* 09/29/2022 0.52  < OR = 4.00 ng/mL Final         Medications  Outpatient Encounter Medications as of 4/5/2023   Medication Sig Dispense Refill    buPROPion (WELLBUTRIN XL) 300 MG 24 hr tablet Take 1 tablet (300 mg total) by mouth once daily. 90 tablet  3    cetirizine (ZYRTEC) 10 MG tablet Take 10 mg by mouth once daily.      EScitalopram oxalate (LEXAPRO) 10 MG tablet Take 1 tablet (10 mg total) by mouth once daily. 30 tablet 5    esomeprazole (NEXIUM) 40 MG capsule TAKE 1 CAPSULE(40 MG) BY MOUTH EVERY DAY 90 capsule 3    fenofibrate (TRICOR) 145 MG tablet Take 1 tablet (145 mg total) by mouth once daily. 90 tablet 3    ibuprofen (ADVIL,MOTRIN) 800 MG tablet Take 1 tablet (800 mg total) by mouth every 6 (six) hours as needed for Pain. 60 tablet 0    irbesartan (AVAPRO) 300 MG tablet TAKE 1 TABLET(300 MG) BY MOUTH EVERY DAY 90 tablet 3    omega 3-dha-epa-fish oil 1,000 mg (120 mg-180 mg) Cap Take 1,000 mg by mouth once daily.       No facility-administered encounter medications on file as of 4/5/2023.           Assessment - Diagnosis - Goals:     Impression: Patient meets the criteria for Insomnia and Adjustment disorder and Depression as per DSM-V criteria.       ICD-10-CM ICD-9-CM   1. Adjustment disorder with anxious mood  F43.22 309.24   2. Depression, major, recurrent, mild  F33.0 296.31   3. Insomnia, unspecified type  G47.00 780.52       Strengths and Liabilities: Strength: Patient accepts guidance/feedback, Strength: Patient is expressive/articulate., Strength: Patient is intelligent., Strength: Patient is motivated for change., Strength: Patient is physically healthy., Strength: Patient has positive support network., Strength: Patient has reasonable judgment.    Treatment Goals:  Specify outcomes written in observable, behavioral terms:   Anxiety: reducing negative automatic thoughts and reducing physical symptoms of anxiety  Depression: decreasing worthlessness (or other schemata-specify  ), increasing energy, increasing motivation, reducing excessive guilt, and reducing negative automatic thoughts    Treatment Plan/Recommendations:   Medication Management: Continue current medications.    The treatment plan and follow up plan were reviewed with the  patient.  Continue Wellbutrin 300 mg XL  Take Lexapro 10 mg daily  Trazodone 50 mg PRN 20 min prior to bedtime  Patient was cautioned on drinking alcohol, operating machinery or driving while on sedating medications   I've explained to him that drugs of the SSRI class can have side effects such as weight gain, sexual dysfunction, insomnia, headache, nausea. These medications are generally effective at alleviating symptoms of anxiety and/or depression. Let me know if significant side effects do occur.      Return to Clinic:  4 weeks    Counseling time: 60  Total time: 90  Consulting clinician was informed of the encounter and consult note.      Joellen Morejon DNP, PMHNP, FNP

## 2023-04-13 ENCOUNTER — OFFICE VISIT (OUTPATIENT)
Dept: URGENT CARE | Facility: CLINIC | Age: 48
End: 2023-04-13
Payer: COMMERCIAL

## 2023-04-13 VITALS
TEMPERATURE: 99 F | HEART RATE: 105 BPM | SYSTOLIC BLOOD PRESSURE: 115 MMHG | DIASTOLIC BLOOD PRESSURE: 82 MMHG | BODY MASS INDEX: 32.9 KG/M2 | WEIGHT: 235 LBS | HEIGHT: 71 IN | OXYGEN SATURATION: 95 %

## 2023-04-13 DIAGNOSIS — Z20.822 EXPOSURE TO COVID-19 VIRUS: ICD-10-CM

## 2023-04-13 DIAGNOSIS — J06.9 VIRAL URI: Primary | ICD-10-CM

## 2023-04-13 LAB
CTP QC/QA: YES
CTP QC/QA: YES
POC MOLECULAR INFLUENZA A AGN: NEGATIVE
POC MOLECULAR INFLUENZA B AGN: NEGATIVE
SARS-COV-2 AG RESP QL IA.RAPID: NEGATIVE

## 2023-04-13 PROCEDURE — 87502 POCT INFLUENZA A/B MOLECULAR: ICD-10-PCS | Mod: QW,S$GLB,, | Performed by: NURSE PRACTITIONER

## 2023-04-13 PROCEDURE — 87811 SARS CORONAVIRUS 2 ANTIGEN POCT, MANUAL READ: ICD-10-PCS | Mod: QW,S$GLB,, | Performed by: NURSE PRACTITIONER

## 2023-04-13 PROCEDURE — 99214 PR OFFICE/OUTPT VISIT, EST, LEVL IV, 30-39 MIN: ICD-10-PCS | Mod: S$GLB,,, | Performed by: NURSE PRACTITIONER

## 2023-04-13 PROCEDURE — 87811 SARS-COV-2 COVID19 W/OPTIC: CPT | Mod: QW,S$GLB,, | Performed by: NURSE PRACTITIONER

## 2023-04-13 PROCEDURE — 87502 INFLUENZA DNA AMP PROBE: CPT | Mod: QW,S$GLB,, | Performed by: NURSE PRACTITIONER

## 2023-04-13 PROCEDURE — 99214 OFFICE O/P EST MOD 30 MIN: CPT | Mod: S$GLB,,, | Performed by: NURSE PRACTITIONER

## 2023-04-13 RX ORDER — COVID-19 MOLECULAR TEST ASSAY
KIT MISCELLANEOUS
Qty: 4 EACH | Refills: 2 | Status: SHIPPED | OUTPATIENT
Start: 2023-04-13 | End: 2023-07-17

## 2023-04-13 NOTE — PROGRESS NOTES
"Subjective:      Patient ID: Rl Grubbs is a 47 y.o. male.    Vitals:  height is 5' 11" (1.803 m) and weight is 106.6 kg (235 lb). His oral temperature is 98.8 °F (37.1 °C). His blood pressure is 115/82 and his pulse is 105. His oxygen saturation is 95%.     Chief Complaint: Fever    This is a 47 y.o. male who presents today with a chief complaint of fever.  Patient started having fever on Tuesday. Highest temp was 101.      Fever   This is a new problem. The current episode started in the past 7 days. The problem occurs daily. The problem has been unchanged. The maximum temperature noted was 101 to 101.9 F. The temperature was taken using an oral thermometer. Associated symptoms include headaches. He has tried acetaminophen for the symptoms. The treatment provided no relief.     Constitution: Positive for fever.   Neurological:  Positive for headaches.    Objective:     Vitals:    04/13/23 1448   BP: 115/82   Pulse: 105   Temp: 98.8 °F (37.1 °C)   TempSrc: Oral   SpO2: 95%   Weight: 106.6 kg (235 lb)   Height: 5' 11" (1.803 m)       Physical Exam   Constitutional: He is oriented to person, place, and time. He appears well-developed. He is cooperative.  Non-toxic appearance. He does not appear ill. No distress.   HENT:   Head: Normocephalic and atraumatic.   Ears:   Right Ear: Hearing, tympanic membrane, external ear and ear canal normal.   Left Ear: Hearing, tympanic membrane, external ear and ear canal normal.   Nose: Congestion present. No mucosal edema, rhinorrhea or nasal deformity. No epistaxis. Right sinus exhibits no maxillary sinus tenderness and no frontal sinus tenderness. Left sinus exhibits no maxillary sinus tenderness and no frontal sinus tenderness.   Mouth/Throat: Uvula is midline, oropharynx is clear and moist and mucous membranes are normal. Mucous membranes are moist. No trismus in the jaw. Normal dentition. No uvula swelling. No oropharyngeal exudate, posterior oropharyngeal edema or " posterior oropharyngeal erythema.   Eyes: Conjunctivae and lids are normal. Pupils are equal, round, and reactive to light. No scleral icterus. Extraocular movement intact   Neck: Trachea normal and phonation normal. Neck supple. No edema present. No erythema present. No neck rigidity present.   Cardiovascular: Normal rate, regular rhythm, normal heart sounds and normal pulses.   Pulmonary/Chest: Effort normal and breath sounds normal. No respiratory distress. He has no decreased breath sounds. He has no rhonchi.   Abdominal: Normal appearance.   Musculoskeletal: Normal range of motion.         General: No deformity. Normal range of motion.   Neurological: He is alert and oriented to person, place, and time. He exhibits normal muscle tone. Coordination normal.   Skin: Skin is warm, dry, intact, not diaphoretic and not pale.   Psychiatric: His speech is normal and behavior is normal. Judgment and thought content normal.   Nursing note and vitals reviewed.    Assessment:     1. Viral URI    2. Exposure to COVID-19 virus      Results for orders placed or performed in visit on 04/13/23   SARS Coronavirus 2 Antigen, POCT Manual Read   Result Value Ref Range    SARS Coronavirus 2 Antigen Negative Negative     Acceptable Yes    POCT Influenza A/B MOLECULAR   Result Value Ref Range    POC Molecular Influenza A Ag Negative Negative, Not Reported    POC Molecular Influenza B Ag Negative Negative, Not Reported     Acceptable Yes        Plan:       Viral URI  -     POCT Influenza A/B MOLECULAR    Exposure to COVID-19 virus  -     SARS Coronavirus 2 Antigen, POCT Manual Read  -     COVID-19 antigen test (BINAXNOW COVID-19 AG SELF TEST) Kit; Test as needed  Dispense: 4 each; Refill: 2             Patient Instructions   Increase fluids   Rest activity ad dotty   Tylenol 650 mg every 4-6 hrs as needed for fever headache body aches   Advil 200 mg 2-3 tabs every 6 hrs as needed for fever, headache body  aches---Must take with food   Supportive care measures   Viral infections usually resolve in 7-10 days; If symptoms persist or worsen follow up UC or PCP   Repeat Covid testing advised in 2-3 days    Any fever >7 days follow up with PCP for further workup include labs

## 2023-04-13 NOTE — PATIENT INSTRUCTIONS
Increase fluids   Rest activity ad dotty   Tylenol 650 mg every 4-6 hrs as needed for fever headache body aches   Advil 200 mg 2-3 tabs every 6 hrs as needed for fever, headache body aches---Must take with food   Supportive care measures   Viral infections usually resolve in 7-10 days; If symptoms persist or worsen follow up UC or PCP   Repeat Covid testing advised in 2-3 days    Any fever >7 days follow up with PCP for further workup include labs

## 2023-04-18 ENCOUNTER — PATIENT MESSAGE (OUTPATIENT)
Dept: ADMINISTRATIVE | Facility: OTHER | Age: 48
End: 2023-04-18
Payer: COMMERCIAL

## 2023-04-21 ENCOUNTER — PATIENT MESSAGE (OUTPATIENT)
Dept: ADMINISTRATIVE | Facility: HOSPITAL | Age: 48
End: 2023-04-21
Payer: COMMERCIAL

## 2023-04-25 LAB — NONINV COLON CA DNA+OCC BLD SCRN STL QL: NEGATIVE

## 2023-04-27 ENCOUNTER — PATIENT MESSAGE (OUTPATIENT)
Dept: ADMINISTRATIVE | Facility: OTHER | Age: 48
End: 2023-04-27
Payer: COMMERCIAL

## 2023-05-05 ENCOUNTER — OFFICE VISIT (OUTPATIENT)
Dept: PSYCHIATRY | Facility: CLINIC | Age: 48
End: 2023-05-05
Payer: COMMERCIAL

## 2023-05-05 VITALS
WEIGHT: 238 LBS | SYSTOLIC BLOOD PRESSURE: 121 MMHG | HEART RATE: 86 BPM | BODY MASS INDEX: 33.19 KG/M2 | DIASTOLIC BLOOD PRESSURE: 88 MMHG

## 2023-05-05 DIAGNOSIS — F33.0 DEPRESSION, MAJOR, RECURRENT, MILD: ICD-10-CM

## 2023-05-05 PROCEDURE — 1160F PR REVIEW ALL MEDS BY PRESCRIBER/CLIN PHARMACIST DOCUMENTED: ICD-10-PCS | Mod: CPTII,S$GLB,, | Performed by: NURSE PRACTITIONER

## 2023-05-05 PROCEDURE — 1159F PR MEDICATION LIST DOCUMENTED IN MEDICAL RECORD: ICD-10-PCS | Mod: CPTII,S$GLB,, | Performed by: NURSE PRACTITIONER

## 2023-05-05 PROCEDURE — 3079F PR MOST RECENT DIASTOLIC BLOOD PRESSURE 80-89 MM HG: ICD-10-PCS | Mod: CPTII,S$GLB,, | Performed by: NURSE PRACTITIONER

## 2023-05-05 PROCEDURE — 99999 PR PBB SHADOW E&M-EST. PATIENT-LVL III: CPT | Mod: PBBFAC,,, | Performed by: NURSE PRACTITIONER

## 2023-05-05 PROCEDURE — 4010F PR ACE/ARB THEARPY RXD/TAKEN: ICD-10-PCS | Mod: CPTII,S$GLB,, | Performed by: NURSE PRACTITIONER

## 2023-05-05 PROCEDURE — 3008F PR BODY MASS INDEX (BMI) DOCUMENTED: ICD-10-PCS | Mod: CPTII,S$GLB,, | Performed by: NURSE PRACTITIONER

## 2023-05-05 PROCEDURE — 3074F PR MOST RECENT SYSTOLIC BLOOD PRESSURE < 130 MM HG: ICD-10-PCS | Mod: CPTII,S$GLB,, | Performed by: NURSE PRACTITIONER

## 2023-05-05 PROCEDURE — 1160F RVW MEDS BY RX/DR IN RCRD: CPT | Mod: CPTII,S$GLB,, | Performed by: NURSE PRACTITIONER

## 2023-05-05 PROCEDURE — 3008F BODY MASS INDEX DOCD: CPT | Mod: CPTII,S$GLB,, | Performed by: NURSE PRACTITIONER

## 2023-05-05 PROCEDURE — 3074F SYST BP LT 130 MM HG: CPT | Mod: CPTII,S$GLB,, | Performed by: NURSE PRACTITIONER

## 2023-05-05 PROCEDURE — 99214 PR OFFICE/OUTPT VISIT, EST, LEVL IV, 30-39 MIN: ICD-10-PCS | Mod: S$GLB,,, | Performed by: NURSE PRACTITIONER

## 2023-05-05 PROCEDURE — 3079F DIAST BP 80-89 MM HG: CPT | Mod: CPTII,S$GLB,, | Performed by: NURSE PRACTITIONER

## 2023-05-05 PROCEDURE — 99999 PR PBB SHADOW E&M-EST. PATIENT-LVL III: ICD-10-PCS | Mod: PBBFAC,,, | Performed by: NURSE PRACTITIONER

## 2023-05-05 PROCEDURE — 1159F MED LIST DOCD IN RCRD: CPT | Mod: CPTII,S$GLB,, | Performed by: NURSE PRACTITIONER

## 2023-05-05 PROCEDURE — 99214 OFFICE O/P EST MOD 30 MIN: CPT | Mod: S$GLB,,, | Performed by: NURSE PRACTITIONER

## 2023-05-05 PROCEDURE — 4010F ACE/ARB THERAPY RXD/TAKEN: CPT | Mod: CPTII,S$GLB,, | Performed by: NURSE PRACTITIONER

## 2023-05-05 RX ORDER — BUPROPION HYDROCHLORIDE 300 MG/1
300 TABLET ORAL DAILY
Qty: 90 TABLET | Refills: 0 | Status: SHIPPED | OUTPATIENT
Start: 2023-05-05 | End: 2023-08-07 | Stop reason: SDUPTHER

## 2023-05-05 RX ORDER — ESCITALOPRAM OXALATE 10 MG/1
10 TABLET ORAL DAILY
Qty: 90 TABLET | Refills: 0 | Status: SHIPPED | OUTPATIENT
Start: 2023-05-05 | End: 2023-07-17
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
## 2023-05-07 NOTE — TELEPHONE ENCOUNTER
No care due was identified.  [facility] Embedded Care Due Messages. Reference number: [id number].   5/07/2023 5:24:00 AM CDT

## 2023-05-08 RX ORDER — ESOMEPRAZOLE MAGNESIUM 40 MG/1
CAPSULE, DELAYED RELEASE ORAL
Qty: 90 CAPSULE | Refills: 3 | Status: SHIPPED | OUTPATIENT
Start: 2023-05-08

## 2023-05-08 NOTE — TELEPHONE ENCOUNTER
Refill Decision Note      Refill Decision Note   Rl Grubbs  is requesting a refill authorization.  Brief Assessment and Rationale for Refill:  Approve     Medication Therapy Plan:         Comments:     Note composed:3:48 AM 05/08/2023             Appointments     Last Visit   4/3/2023 Tomer Roberto MD   Next Visit   10/6/2023 Tomer Roberto MD

## 2023-05-18 ENCOUNTER — PATIENT MESSAGE (OUTPATIENT)
Dept: PRIMARY CARE CLINIC | Facility: CLINIC | Age: 48
End: 2023-05-18
Payer: COMMERCIAL

## 2023-05-23 ENCOUNTER — OFFICE VISIT (OUTPATIENT)
Dept: SURGERY | Facility: CLINIC | Age: 48
End: 2023-05-23
Payer: COMMERCIAL

## 2023-05-23 VITALS
HEART RATE: 97 BPM | WEIGHT: 249.31 LBS | HEIGHT: 71 IN | OXYGEN SATURATION: 97 % | SYSTOLIC BLOOD PRESSURE: 135 MMHG | BODY MASS INDEX: 34.9 KG/M2 | DIASTOLIC BLOOD PRESSURE: 83 MMHG

## 2023-05-23 DIAGNOSIS — K43.9 VENTRAL HERNIA WITHOUT OBSTRUCTION OR GANGRENE: Primary | ICD-10-CM

## 2023-05-23 PROCEDURE — 1159F MED LIST DOCD IN RCRD: CPT | Mod: CPTII,S$GLB,, | Performed by: SURGERY

## 2023-05-23 PROCEDURE — 3075F SYST BP GE 130 - 139MM HG: CPT | Mod: CPTII,S$GLB,, | Performed by: SURGERY

## 2023-05-23 PROCEDURE — 3079F PR MOST RECENT DIASTOLIC BLOOD PRESSURE 80-89 MM HG: ICD-10-PCS | Mod: CPTII,S$GLB,, | Performed by: SURGERY

## 2023-05-23 PROCEDURE — 3008F BODY MASS INDEX DOCD: CPT | Mod: CPTII,S$GLB,, | Performed by: SURGERY

## 2023-05-23 PROCEDURE — 99999 PR PBB SHADOW E&M-EST. PATIENT-LVL III: ICD-10-PCS | Mod: PBBFAC,,, | Performed by: SURGERY

## 2023-05-23 PROCEDURE — 3075F PR MOST RECENT SYSTOLIC BLOOD PRESS GE 130-139MM HG: ICD-10-PCS | Mod: CPTII,S$GLB,, | Performed by: SURGERY

## 2023-05-23 PROCEDURE — 4010F ACE/ARB THERAPY RXD/TAKEN: CPT | Mod: CPTII,S$GLB,, | Performed by: SURGERY

## 2023-05-23 PROCEDURE — 1159F PR MEDICATION LIST DOCUMENTED IN MEDICAL RECORD: ICD-10-PCS | Mod: CPTII,S$GLB,, | Performed by: SURGERY

## 2023-05-23 PROCEDURE — 99213 PR OFFICE/OUTPT VISIT, EST, LEVL III, 20-29 MIN: ICD-10-PCS | Mod: S$GLB,,, | Performed by: SURGERY

## 2023-05-23 PROCEDURE — 99213 OFFICE O/P EST LOW 20 MIN: CPT | Mod: S$GLB,,, | Performed by: SURGERY

## 2023-05-23 PROCEDURE — 4010F PR ACE/ARB THEARPY RXD/TAKEN: ICD-10-PCS | Mod: CPTII,S$GLB,, | Performed by: SURGERY

## 2023-05-23 PROCEDURE — 3079F DIAST BP 80-89 MM HG: CPT | Mod: CPTII,S$GLB,, | Performed by: SURGERY

## 2023-05-23 PROCEDURE — 99999 PR PBB SHADOW E&M-EST. PATIENT-LVL III: CPT | Mod: PBBFAC,,, | Performed by: SURGERY

## 2023-05-23 PROCEDURE — 3008F PR BODY MASS INDEX (BMI) DOCUMENTED: ICD-10-PCS | Mod: CPTII,S$GLB,, | Performed by: SURGERY

## 2023-05-23 NOTE — PROGRESS NOTES
General Surgery Office Visit   History and Physical    Patient Name: Rl Grubbs  YOB: 1975 (47 y.o.)  MRN: [id number]  Today's Date: 05/25/2023    Referring Md:   No referring provider defined for this encounter.    SUBJECTIVE:     Chief Complaint: Abdominal pain    History of Present Illness:  Rl Grubbs is a 47 y.o. male with PMHx of HTN, depression, HLD, GERD, TOBIN who presents to the clinic today complaining of abdominal pain and bulge which he first noticed a few months ago and has worsened over time. He underwent robotic cholecystectomy primary repair of epigastric hernia on 7/28/21. Reports a burning pain to the region that has become more frequent over time. Also notices a bulge to the area at times, sometimes worse than others. No known aggravating or alleviating factors. He denies fever, chills, unintentional weight loss, n/v/d, constipation, hematochezia, dysuria, hematuria, CP, SOB, and all other symptoms. Patient reports being compliant with home medication regimen.     Patient denies personal history of MI, CVA, lung disease, DM  Social ETOH use. Denies tobacco and elicit drug use.   Not currently on any anticoagulants      Review of patient's allergies indicates:  No Known Allergies    Past Medical History:   Diagnosis Date    Hypertension      Past Surgical History:   Procedure Laterality Date    CHOLECYSTECTOMY  7-28-21    HERNIA REPAIR  7-    ROBOT-ASSISTED CHOLECYSTECTOMY USING DA LORENE XI N/A 07/28/2021    Procedure: XI ROBOTIC CHOLECYSTECTOMY;  Surgeon: Brett Gastelum MD;  Location: [address];  Service: General;  Laterality: N/A;    ROBOT-ASSISTED LAPAROSCOPIC REPAIR OF EPIGASTRIC HERNIA N/A 07/28/2021    Procedure: ROBOTIC REPAIR, HERNIA, EPIGASTRIC;  Surgeon: Brett Gastelum MD;  Location: [facility] OR [address] [place] [address];  Service: General;  Laterality: N/A;  Incarcerated visceral hernia repair       Family History   Problem Relation Age of Onset    Asthma Mother   "   COPD Mother     Hearing loss Mother     Heart disease Mother     Hypertension Mother     Kidney disease Mother     Cancer Father     Heart disease Brother     Hypertension Brother     Diabetes Brother      Social History     Tobacco Use    Smoking status: Never    Smokeless tobacco: Never   Substance Use Topics    Alcohol use: Yes     Alcohol/week: 6.0 standard drinks     Types: 6 Cans of beer per week     Comment: Socially    Drug use: Never        Review of Systems:  Review of Systems   Constitutional:  Negative for chills and fever.   Respiratory:  Negative for cough and shortness of breath.    Cardiovascular:  Negative for chest pain.   Gastrointestinal:  Positive for abdominal pain. Negative for diarrhea, nausea and vomiting.        (+) abdominal bulge    Genitourinary:  Negative for dysuria and hematuria.   Musculoskeletal:  Negative for falls.   Skin:  Negative for rash.   Neurological:  Negative for dizziness and headaches.   Psychiatric/Behavioral:  Negative for substance abuse. The patient is not nervous/anxious.    All other systems reviewed and are negative.    OBJECTIVE:     Vital Signs (Most Recent)  /83 (BP Location: Right arm, Patient Position: Sitting)   Pulse 97   Ht 5' 11" (1.803 m)   Wt 113.1 kg (249 lb 5.4 oz)   SpO2 97%   BMI 34.78 kg/m²     Physical Exam  Vitals and nursing note reviewed.   Constitutional:       General: He is not in acute distress.     Appearance: He is not ill-appearing or diaphoretic.      Comments: Room air   HENT:      Head: Normocephalic and atraumatic.      Mouth/Throat:      Mouth: Mucous membranes are moist.      Pharynx: Oropharynx is clear.   Eyes:      Extraocular Movements: Extraocular movements intact.      Conjunctiva/sclera: Conjunctivae normal.   Cardiovascular:      Rate and Rhythm: Normal rate.   Pulmonary:      Effort: Pulmonary effort is normal. No respiratory distress.   Abdominal:      General: There is no distension.      Palpations: " Abdomen is soft.      Tenderness: There is no abdominal tenderness. There is no guarding or rebound.      Hernia: A hernia is present.      Comments: Soft, reducible ventral hernia at midline above umbilicus. Not TTP. No overlying skin changes. No peritonitic signs    Musculoskeletal:         General: No deformity.   Skin:     General: Skin is warm and dry.      Coloration: Skin is not jaundiced.   Neurological:      Mental Status: He is alert and oriented to person, place, and time.       ASSESSMENT/PLAN:     Rl Grubbs is a 47 y.o. male with PMHx of HTN, depression, HLD, GERD, TOBIN who presents to the clinic today complaining of abdominal pain and bulge concerning for ventral hernia. Clinically stable without signs of incarceration/strangulation/obstruction, interested in repair     Rl was seen today for follow-up.    Diagnoses and all orders for this visit:    Ventral hernia without obstruction or gangrene    - Discussed risks and benefits of both operative and nonoperative management with patient in detail. We discussed that hernias do not go away on their own, and that only surgery can repair a hernia. Options include scheduling elective repair of the hernia, or continuing a conservative approach and not repairing the hernia if it is overall asymptomatic or mildly symptomatic. Hernias tend to get larger in size over long periods of time, but they are safe to monitor no matter the size. The risk of strangulation was explained including worrisome symptoms of increased pain in the area, overlying skin changes, or obstructive symptoms that could warrant emergent surgery, but that the risk of that happening is low and close observation of asymptomatic hernias is a safe option. Patient was informed that postoperatively, we recommend lifting restriction of no more than 10lbs for 6-8weeks. Patient understands and agrees to the lifting restriction postoperatively to help reduce the risk of recurrence. Patient  elected for operative management  - Schedule for robotic repair with Dr. Gastelum  - ED precautions given  - Continue any current medications  - Call with any questions or concerns  - Discussed POC with patient. All questions were answered. Patient is agreeable to plan and verbalized understanding.     Case discussed with Dr. Gastelum. Patient seen and examined by Dr. Gastelum.    JAIME Mejía, PA-C  General Surgery  - [facility]

## 2023-06-19 ENCOUNTER — PATIENT MESSAGE (OUTPATIENT)
Dept: PRIMARY CARE CLINIC | Facility: CLINIC | Age: 48
End: 2023-06-19
Payer: COMMERCIAL

## 2023-06-19 ENCOUNTER — PATIENT MESSAGE (OUTPATIENT)
Dept: PSYCHIATRY | Facility: CLINIC | Age: 48
End: 2023-06-19
Payer: COMMERCIAL

## 2023-06-19 DIAGNOSIS — Z72.51 HIGH RISK HETEROSEXUAL BEHAVIOR: Primary | ICD-10-CM

## 2023-06-20 ENCOUNTER — PATIENT MESSAGE (OUTPATIENT)
Dept: PRIMARY CARE CLINIC | Facility: CLINIC | Age: 48
End: 2023-06-20
Payer: COMMERCIAL

## 2023-06-25 LAB
C TRACH RRNA SPEC QL NAA+PROBE: NOT DETECTED
COMMENT: NORMAL
HIV 1+2 AB+HIV1 P24 AG SERPL QL IA: NORMAL
HSV1 IGM SER QL IF: NEGATIVE
HSV2 IGM SER QL IF: NEGATIVE
N GONORRHOEA RRNA SPEC QL NAA+PROBE: NOT DETECTED
RPR SER QL: NORMAL

## 2023-07-07 ENCOUNTER — PATIENT MESSAGE (OUTPATIENT)
Dept: PRIMARY CARE CLINIC | Facility: CLINIC | Age: 48
End: 2023-07-07
Payer: COMMERCIAL

## 2023-07-10 ENCOUNTER — PATIENT MESSAGE (OUTPATIENT)
Dept: PRIMARY CARE CLINIC | Facility: CLINIC | Age: 48
End: 2023-07-10
Payer: COMMERCIAL

## 2023-07-17 ENCOUNTER — OFFICE VISIT (OUTPATIENT)
Dept: PRIMARY CARE CLINIC | Facility: CLINIC | Age: 48
End: 2023-07-17
Payer: COMMERCIAL

## 2023-07-17 VITALS
DIASTOLIC BLOOD PRESSURE: 70 MMHG | SYSTOLIC BLOOD PRESSURE: 120 MMHG | HEART RATE: 93 BPM | WEIGHT: 265.19 LBS | OXYGEN SATURATION: 97 % | RESPIRATION RATE: 17 BRPM | BODY MASS INDEX: 36.99 KG/M2

## 2023-07-17 DIAGNOSIS — E66.01 SEVERE OBESITY (BMI 35.0-39.9) WITH COMORBIDITY: ICD-10-CM

## 2023-07-17 DIAGNOSIS — Z90.49 HISTORY OF CHOLECYSTECTOMY: ICD-10-CM

## 2023-07-17 DIAGNOSIS — R10.11 RIGHT UPPER QUADRANT ABDOMINAL PAIN: Primary | ICD-10-CM

## 2023-07-17 DIAGNOSIS — M54.50 LUMBAR BACK PAIN: ICD-10-CM

## 2023-07-17 DIAGNOSIS — I10 BENIGN ESSENTIAL HYPERTENSION: ICD-10-CM

## 2023-07-17 DIAGNOSIS — Z87.81 HISTORY OF RIB FRACTURE: ICD-10-CM

## 2023-07-17 DIAGNOSIS — K21.9 GASTROESOPHAGEAL REFLUX DISEASE, UNSPECIFIED WHETHER ESOPHAGITIS PRESENT: ICD-10-CM

## 2023-07-17 DIAGNOSIS — K25.7 CHRONIC GASTRIC ULCER WITHOUT HEMORRHAGE AND WITHOUT PERFORATION: ICD-10-CM

## 2023-07-17 PROCEDURE — 4010F PR ACE/ARB THEARPY RXD/TAKEN: ICD-10-PCS | Mod: CPTII,S$GLB,, | Performed by: NURSE PRACTITIONER

## 2023-07-17 PROCEDURE — 3078F PR MOST RECENT DIASTOLIC BLOOD PRESSURE < 80 MM HG: ICD-10-PCS | Mod: CPTII,S$GLB,, | Performed by: NURSE PRACTITIONER

## 2023-07-17 PROCEDURE — 1159F MED LIST DOCD IN RCRD: CPT | Mod: CPTII,S$GLB,, | Performed by: NURSE PRACTITIONER

## 2023-07-17 PROCEDURE — 3074F PR MOST RECENT SYSTOLIC BLOOD PRESSURE < 130 MM HG: ICD-10-PCS | Mod: CPTII,S$GLB,, | Performed by: NURSE PRACTITIONER

## 2023-07-17 PROCEDURE — 4010F ACE/ARB THERAPY RXD/TAKEN: CPT | Mod: CPTII,S$GLB,, | Performed by: NURSE PRACTITIONER

## 2023-07-17 PROCEDURE — 99214 OFFICE O/P EST MOD 30 MIN: CPT | Mod: S$GLB,,, | Performed by: NURSE PRACTITIONER

## 2023-07-17 PROCEDURE — 3008F BODY MASS INDEX DOCD: CPT | Mod: CPTII,S$GLB,, | Performed by: NURSE PRACTITIONER

## 2023-07-17 PROCEDURE — 1160F RVW MEDS BY RX/DR IN RCRD: CPT | Mod: CPTII,S$GLB,, | Performed by: NURSE PRACTITIONER

## 2023-07-17 PROCEDURE — 3078F DIAST BP <80 MM HG: CPT | Mod: CPTII,S$GLB,, | Performed by: NURSE PRACTITIONER

## 2023-07-17 PROCEDURE — 3008F PR BODY MASS INDEX (BMI) DOCUMENTED: ICD-10-PCS | Mod: CPTII,S$GLB,, | Performed by: NURSE PRACTITIONER

## 2023-07-17 PROCEDURE — 3074F SYST BP LT 130 MM HG: CPT | Mod: CPTII,S$GLB,, | Performed by: NURSE PRACTITIONER

## 2023-07-17 PROCEDURE — 99999 PR PBB SHADOW E&M-EST. PATIENT-LVL IV: CPT | Mod: PBBFAC,,, | Performed by: NURSE PRACTITIONER

## 2023-07-17 PROCEDURE — 99999 PR PBB SHADOW E&M-EST. PATIENT-LVL IV: ICD-10-PCS | Mod: PBBFAC,,, | Performed by: NURSE PRACTITIONER

## 2023-07-17 PROCEDURE — 99214 PR OFFICE/OUTPT VISIT, EST, LEVL IV, 30-39 MIN: ICD-10-PCS | Mod: S$GLB,,, | Performed by: NURSE PRACTITIONER

## 2023-07-17 PROCEDURE — 1160F PR REVIEW ALL MEDS BY PRESCRIBER/CLIN PHARMACIST DOCUMENTED: ICD-10-PCS | Mod: CPTII,S$GLB,, | Performed by: NURSE PRACTITIONER

## 2023-07-17 PROCEDURE — 1159F PR MEDICATION LIST DOCUMENTED IN MEDICAL RECORD: ICD-10-PCS | Mod: CPTII,S$GLB,, | Performed by: NURSE PRACTITIONER

## 2023-07-17 RX ORDER — METHOCARBAMOL 750 MG/1
750 TABLET, FILM COATED ORAL 2 TIMES DAILY PRN
Qty: 20 TABLET | Refills: 0 | Status: SHIPPED | OUTPATIENT
Start: 2023-07-17 | End: 2023-07-27

## 2023-07-17 NOTE — PROGRESS NOTES
[facility] Note    Chief Complaint      Chief Complaint   Patient presents with    Abdominal Pain     Right side pain, comes and goes. Feels like a squeezing feeling and pain is from a 5-6     History of Present Illness      Rl Grubbs is a 47 y.o. male patient of Dr. Nazario  with history of chronic gastric ulcer and GERD who presents today for complaints of intermittent right abdominal pain x 2 weeks. (July 4th). Sometimes wraps around to right upper back. Pain 5-6/10. Pt reports pain worse with increased use on his right side, also has right shoulder/rotator cuff pain. J Luis is intermittently sharp on acitivity.   Had cholecystectomy 2021.       Pt reports has gained about 25 lbs over the past year. Feels this isn't helping with all his pain. Is inquiring about Mounjaro- or any injectables to lose weight.   BMI-36.99. pt IFG  Health Maintenance   Topic Date Due    TETANUS VACCINE  Never done    Lipid Panel  09/29/2027    Hepatitis C Screening  Completed       Past Medical History:   Diagnosis Date    Hypertension        Past Surgical History:   Procedure Laterality Date    CHOLECYSTECTOMY  7-28-21    HERNIA REPAIR  7-    ROBOT-ASSISTED CHOLECYSTECTOMY USING DA LORENE XI N/A 07/28/2021    Procedure: XI ROBOTIC CHOLECYSTECTOMY;  Surgeon: Brett Gastelum MD;  Location: [address];  Service: General;  Laterality: N/A;    ROBOT-ASSISTED LAPAROSCOPIC REPAIR OF EPIGASTRIC HERNIA N/A 07/28/2021    Procedure: ROBOTIC REPAIR, HERNIA, EPIGASTRIC;  Surgeon: Brett Gastelum MD;  Location: [facility] OR [address] [place] [address];  Service: General;  Laterality: N/A;  Incarcerated visceral hernia repair         family history includes Asthma in his mother; COPD in his mother; Cancer in his father; Diabetes in his brother; Hearing loss in his mother; Heart disease in his brother and mother; Hypertension in his brother and mother; Kidney disease in his mother.     Social History     Tobacco Use    Smoking  status: Never    Smokeless tobacco: Never   Substance Use Topics    Alcohol use: Yes     Alcohol/week: 6.0 standard drinks     Types: 6 Cans of beer per week     Comment: Socially    Drug use: Never       Review of Systems   Constitutional:  Negative for chills, fever and malaise/fatigue.   Eyes:  Negative for blurred vision.   Respiratory:  Negative for shortness of breath.    Cardiovascular:  Negative for chest pain and palpitations.   Gastrointestinal:  Positive for abdominal pain. Negative for constipation, diarrhea, nausea and vomiting.   Genitourinary:  Negative for dysuria.   Musculoskeletal:  Positive for back pain. Negative for neck pain.   Neurological:  Negative for dizziness, weakness and headaches.      Outpatient Encounter Medications as of 7/17/2023   Medication Sig Dispense Refill    buPROPion (WELLBUTRIN XL) 300 MG 24 hr tablet Take 1 tablet (300 mg total) by mouth once daily. 90 tablet 0    cetirizine (ZYRTEC) 10 MG tablet Take 10 mg by mouth once daily.      esomeprazole (NEXIUM) 40 MG capsule TAKE 1 CAPSULE(40 MG) BY MOUTH EVERY DAY 90 capsule 3    fenofibrate (TRICOR) 145 MG tablet Take 1 tablet (145 mg total) by mouth once daily. 90 tablet 3    ibuprofen (ADVIL,MOTRIN) 800 MG tablet Take 1 tablet (800 mg total) by mouth every 6 (six) hours as needed for Pain. 60 tablet 0    irbesartan (AVAPRO) 300 MG tablet TAKE 1 TABLET(300 MG) BY MOUTH EVERY DAY 90 tablet 1    omega 3-dha-epa-fish oil 1,000 mg (120 mg-180 mg) Cap Take 1,000 mg by mouth once daily.      COVID-19 antigen test (BINAXNOW COVID-19 AG SELF TEST) Kit Test as needed 4 each 2    EScitalopram oxalate (LEXAPRO) 10 MG tablet Take 1 tablet (10 mg total) by mouth once daily. 90 tablet 0    methocarbamoL (ROBAXIN) 750 MG Tab Take 1 tablet (750 mg total) by mouth 2 (two) times daily as needed (as needed for muscle strain). 20 tablet 0    traZODone (DESYREL) 50 MG tablet Take 1 tablet (50 mg total) by mouth every evening. (Patient not taking:  Reported on 4/13/2023) 60 tablet 0     No facility-administered encounter medications on file as of 7/17/2023.       Review of patient's allergies indicates:  No Known Allergies    Physical Exam      Vital Signs  Pulse: 93  Resp: 17  SpO2: 97 %  BP: 120/70  BP Location: Right arm  Patient Position: Sitting  Height and Weight  Weight: 120.3 kg (265 lb 3.4 oz)    Physical Exam  Vitals and nursing note reviewed.   Constitutional:       General: He is not in acute distress.     Appearance: Normal appearance. He is not ill-appearing.   HENT:      Head: Normocephalic and atraumatic.   Cardiovascular:      Rate and Rhythm: Normal rate and regular rhythm.      Heart sounds: Normal heart sounds.   Pulmonary:      Effort: Pulmonary effort is normal. No respiratory distress.      Breath sounds: Normal breath sounds.   Abdominal:      General: Bowel sounds are normal. There is no distension.      Palpations: Abdomen is soft. There is no mass.      Tenderness: There is abdominal tenderness.      Hernia: No hernia is present.   Musculoskeletal:         General: Normal range of motion.   Skin:     General: Skin is warm and dry.   Neurological:      General: No focal deficit present.      Mental Status: He is alert and oriented to person, place, and time.   Psychiatric:         Mood and Affect: Mood normal.         Behavior: Behavior normal.         Thought Content: Thought content normal.         Judgment: Judgment normal.        Laboratory:  CBC:  Lab Results   Component Value Date    WBC 5.8 09/29/2022    RBC 5.58 09/29/2022    HGB 15.5 09/29/2022    HCT 47.6 09/29/2022     09/29/2022    MCV 85.3 09/29/2022    MCH 27.8 09/29/2022    MCHC 32.6 09/29/2022    MCHC 34.4 09/24/2021    MCHC 33.7 06/14/2021     CMP:  Lab Results   Component Value Date     (H) 09/29/2022    CALCIUM 9.9 09/29/2022    ALBUMIN 4.5 09/29/2022    PROT 7.4 09/29/2022     09/29/2022    K 4.8 09/29/2022    CO2 30 09/29/2022      09/29/2022    BUN 15 09/29/2022    ALKPHOS 53 09/24/2021    ALT 16 09/29/2022    AST 15 09/29/2022    BILITOT 0.5 09/29/2022    BILITOT 0.3 09/24/2021    BILITOT 0.2 06/14/2021     URINALYSIS:  Lab Results   Component Value Date    COLORU Yellow 06/14/2021    SPECGRAV 1.025 06/14/2021    PHUR 5.0 06/14/2021    PROTEINUA Negative 06/14/2021    NITRITE Negative 06/14/2021    LEUKOCYTESUR Negative 06/14/2021      LIPIDS:  Lab Results   Component Value Date    HDL 45 09/29/2022    HDL 41 09/24/2021    HDL 45 09/17/2020    CHOL 146 09/29/2022    CHOL 154 09/24/2021    CHOL 164 09/17/2020    TRIG 107 09/29/2022    TRIG 102 09/24/2021    TRIG 153 (H) 09/17/2020    LDLCALC 81 09/29/2022    LDLCALC 97 09/24/2021    LDLCALC 94 09/17/2020    CHOLHDL 3.2 09/29/2022    CHOLHDL 3.6 09/17/2020    CHOLHDL 3.3 09/09/2019    NONHDLCHOL 101 09/29/2022    NONHDLCHOL 113 09/24/2021    NONHDLCHOL 119 09/17/2020     TSH:  No results found for: TSH  A1C:  No results found for: HGBA1C      Assessment/Plan     Rl Grubbs is a 47 y.o.male with:    Right upper quadrant abdominal pain  -     US Abdomen Limited; Future; Expected date: 07/17/2023  -     X-Ray Chest 1 View; Future; Expected date: 07/17/2023  -     X-Ray Tibia Fibula 2 View Right; Future; Expected date: 07/17/2023  -     methocarbamoL (ROBAXIN) 750 MG Tab; Take 1 tablet (750 mg total) by mouth 2 (two) times daily as needed (as needed for muscle strain).  Dispense: 20 tablet; Refill: 0    History of cholecystectomy  -     US Abdomen Limited; Future; Expected date: 07/17/2023  -     X-Ray Chest 1 View; Future; Expected date: 07/17/2023  -     X-Ray Tibia Fibula 2 View Right; Future; Expected date: 07/17/2023  -     methocarbamoL (ROBAXIN) 750 MG Tab; Take 1 tablet (750 mg total) by mouth 2 (two) times daily as needed (as needed for muscle strain).  Dispense: 20 tablet; Refill: 0    History of rib fracture  -     US Abdomen Limited; Future; Expected date: 07/17/2023  -      X-Ray Chest 1 View; Future; Expected date: 07/17/2023  -     X-Ray Tibia Fibula 2 View Right; Future; Expected date: 07/17/2023  -     methocarbamoL (ROBAXIN) 750 MG Tab; Take 1 tablet (750 mg total) by mouth 2 (two) times daily as needed (as needed for muscle strain).  Dispense: 20 tablet; Refill: 0    Gastroesophageal reflux disease, unspecified whether esophagitis present  -     US Abdomen Limited; Future; Expected date: 07/17/2023  -     X-Ray Chest 1 View; Future; Expected date: 07/17/2023  -     X-Ray Tibia Fibula 2 View Right; Future; Expected date: 07/17/2023  -     methocarbamoL (ROBAXIN) 750 MG Tab; Take 1 tablet (750 mg total) by mouth 2 (two) times daily as needed (as needed for muscle strain).  Dispense: 20 tablet; Refill: 0    Chronic gastric ulcer without hemorrhage and without perforation  -     US Abdomen Limited; Future; Expected date: 07/17/2023  -     X-Ray Chest 1 View; Future; Expected date: 07/17/2023  -     X-Ray Tibia Fibula 2 View Right; Future; Expected date: 07/17/2023  -     methocarbamoL (ROBAXIN) 750 MG Tab; Take 1 tablet (750 mg total) by mouth 2 (two) times daily as needed (as needed for muscle strain).  Dispense: 20 tablet; Refill: 0    Lumbar back pain    Benign essential hypertension  -     US Abdomen Limited; Future; Expected date: 07/17/2023  -     X-Ray Chest 1 View; Future; Expected date: 07/17/2023  -     X-Ray Tibia Fibula 2 View Right; Future; Expected date: 07/17/2023  -     methocarbamoL (ROBAXIN) 750 MG Tab; Take 1 tablet (750 mg total) by mouth 2 (two) times daily as needed (as needed for muscle strain).  Dispense: 20 tablet; Refill: 0    Severe obesity (BMI 35.0-39.9) with comorbidity         Health Maintenance Due   Topic Date Due    TETANUS VACCINE  Never done    Hemoglobin A1c (Diabetic Prevention Screening)  Never done    COVID-19 Vaccine (5 - Moderna series) 05/26/2021          I spent 43 minutes on the day of this encounter for preparing for, evaluating,  treating, and managing this patient.        -Continue current medications and maintain follow up with specialists.  Return to clinic as needed for any concerns.     No follow-ups on file.      QI Acevedo  [facility] - [facility]

## 2023-07-19 ENCOUNTER — HOSPITAL ENCOUNTER (OUTPATIENT)
Dept: RADIOLOGY | Facility: HOSPITAL | Age: 48
Discharge: HOME | End: 2023-07-19
Attending: NURSE PRACTITIONER
Payer: COMMERCIAL

## 2023-07-19 ENCOUNTER — PATIENT MESSAGE (OUTPATIENT)
Dept: PRIMARY CARE CLINIC | Facility: CLINIC | Age: 48
End: 2023-07-19
Payer: COMMERCIAL

## 2023-07-19 DIAGNOSIS — K21.9 GASTROESOPHAGEAL REFLUX DISEASE, UNSPECIFIED WHETHER ESOPHAGITIS PRESENT: ICD-10-CM

## 2023-07-19 DIAGNOSIS — R10.11 RIGHT UPPER QUADRANT ABDOMINAL PAIN: ICD-10-CM

## 2023-07-19 DIAGNOSIS — Z87.81 HISTORY OF RIB FRACTURE: ICD-10-CM

## 2023-07-19 DIAGNOSIS — I10 BENIGN ESSENTIAL HYPERTENSION: ICD-10-CM

## 2023-07-19 DIAGNOSIS — K25.7 CHRONIC GASTRIC ULCER WITHOUT HEMORRHAGE AND WITHOUT PERFORATION: ICD-10-CM

## 2023-07-19 DIAGNOSIS — Z90.49 HISTORY OF CHOLECYSTECTOMY: ICD-10-CM

## 2023-07-19 PROCEDURE — 73590 X-RAY EXAM OF LOWER LEG: CPT | Mod: 26,RT,, | Performed by: RADIOLOGY

## 2023-07-19 PROCEDURE — 76705 US ABDOMEN LIMITED: ICD-10-PCS | Mod: 26,,, | Performed by: RADIOLOGY

## 2023-07-19 PROCEDURE — 76705 ECHO EXAM OF ABDOMEN: CPT | Mod: TC

## 2023-07-19 PROCEDURE — 73590 XR TIBIA FIBULA 2 VIEW RIGHT: ICD-10-PCS | Mod: 26,RT,, | Performed by: RADIOLOGY

## 2023-07-19 PROCEDURE — 76705 ECHO EXAM OF ABDOMEN: CPT | Mod: 26,,, | Performed by: RADIOLOGY

## 2023-07-19 PROCEDURE — 71045 X-RAY EXAM CHEST 1 VIEW: CPT | Mod: TC

## 2023-07-19 PROCEDURE — 71045 X-RAY EXAM CHEST 1 VIEW: CPT | Mod: 26,,, | Performed by: RADIOLOGY

## 2023-07-19 PROCEDURE — 71045 XR CHEST 1 VIEW: ICD-10-PCS | Mod: 26,,, | Performed by: RADIOLOGY

## 2023-07-19 PROCEDURE — 73590 X-RAY EXAM OF LOWER LEG: CPT | Mod: TC,RT

## 2023-07-26 ENCOUNTER — PATIENT MESSAGE (OUTPATIENT)
Dept: PRIMARY CARE CLINIC | Facility: CLINIC | Age: 48
End: 2023-07-26
Payer: COMMERCIAL

## 2023-07-26 DIAGNOSIS — Z90.49 HISTORY OF CHOLECYSTECTOMY: ICD-10-CM

## 2023-07-26 DIAGNOSIS — R10.11 RIGHT UPPER QUADRANT ABDOMINAL PAIN: Primary | ICD-10-CM

## 2023-07-26 DIAGNOSIS — K25.7 CHRONIC GASTRIC ULCER WITHOUT HEMORRHAGE AND WITHOUT PERFORATION: ICD-10-CM

## 2023-08-07 ENCOUNTER — OFFICE VISIT (OUTPATIENT)
Dept: PSYCHIATRY | Facility: CLINIC | Age: 48
End: 2023-08-07
Payer: COMMERCIAL

## 2023-08-07 VITALS
WEIGHT: 265.88 LBS | BODY MASS INDEX: 37.08 KG/M2 | DIASTOLIC BLOOD PRESSURE: 73 MMHG | SYSTOLIC BLOOD PRESSURE: 122 MMHG | HEART RATE: 85 BPM

## 2023-08-07 DIAGNOSIS — F33.0 DEPRESSION, MAJOR, RECURRENT, MILD: ICD-10-CM

## 2023-08-07 PROCEDURE — 3074F PR MOST RECENT SYSTOLIC BLOOD PRESSURE < 130 MM HG: ICD-10-PCS | Mod: CPTII,S$GLB,, | Performed by: NURSE PRACTITIONER

## 2023-08-07 PROCEDURE — 3008F BODY MASS INDEX DOCD: CPT | Mod: CPTII,S$GLB,, | Performed by: NURSE PRACTITIONER

## 2023-08-07 PROCEDURE — 3078F PR MOST RECENT DIASTOLIC BLOOD PRESSURE < 80 MM HG: ICD-10-PCS | Mod: CPTII,S$GLB,, | Performed by: NURSE PRACTITIONER

## 2023-08-07 PROCEDURE — 99214 PR OFFICE/OUTPT VISIT, EST, LEVL IV, 30-39 MIN: ICD-10-PCS | Mod: S$GLB,,, | Performed by: NURSE PRACTITIONER

## 2023-08-07 PROCEDURE — 99999 PR PBB SHADOW E&M-EST. PATIENT-LVL II: ICD-10-PCS | Mod: PBBFAC,,, | Performed by: NURSE PRACTITIONER

## 2023-08-07 PROCEDURE — 4010F PR ACE/ARB THEARPY RXD/TAKEN: ICD-10-PCS | Mod: CPTII,S$GLB,, | Performed by: NURSE PRACTITIONER

## 2023-08-07 PROCEDURE — 4010F ACE/ARB THERAPY RXD/TAKEN: CPT | Mod: CPTII,S$GLB,, | Performed by: NURSE PRACTITIONER

## 2023-08-07 PROCEDURE — 99999 PR PBB SHADOW E&M-EST. PATIENT-LVL II: CPT | Mod: PBBFAC,,, | Performed by: NURSE PRACTITIONER

## 2023-08-07 PROCEDURE — 99214 OFFICE O/P EST MOD 30 MIN: CPT | Mod: S$GLB,,, | Performed by: NURSE PRACTITIONER

## 2023-08-07 PROCEDURE — 3078F DIAST BP <80 MM HG: CPT | Mod: CPTII,S$GLB,, | Performed by: NURSE PRACTITIONER

## 2023-08-07 PROCEDURE — 3074F SYST BP LT 130 MM HG: CPT | Mod: CPTII,S$GLB,, | Performed by: NURSE PRACTITIONER

## 2023-08-07 PROCEDURE — 3008F PR BODY MASS INDEX (BMI) DOCUMENTED: ICD-10-PCS | Mod: CPTII,S$GLB,, | Performed by: NURSE PRACTITIONER

## 2023-08-07 RX ORDER — BUPROPION HYDROCHLORIDE 300 MG/1
300 TABLET ORAL DAILY
Qty: 90 TABLET | Refills: 1 | Status: SHIPPED | OUTPATIENT
Start: 2023-08-07 | End: 2024-02-11 | Stop reason: SDUPTHER
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
## 2023-08-17 ENCOUNTER — OFFICE VISIT (OUTPATIENT)
Dept: DERMATOLOGY | Facility: CLINIC | Age: 48
End: 2023-08-17
Payer: COMMERCIAL

## 2023-08-17 DIAGNOSIS — Z12.83 SKIN CANCER SCREENING: ICD-10-CM

## 2023-08-17 DIAGNOSIS — L81.4 LENTIGINES: ICD-10-CM

## 2023-08-17 DIAGNOSIS — L71.0 PERIORIFICIAL DERMATITIS: Primary | ICD-10-CM

## 2023-08-17 DIAGNOSIS — L82.1 SEBORRHEIC KERATOSIS: ICD-10-CM

## 2023-08-17 DIAGNOSIS — D22.5 MELANOCYTIC NEVI OF TRUNK: ICD-10-CM

## 2023-08-17 PROCEDURE — 4010F PR ACE/ARB THEARPY RXD/TAKEN: ICD-10-PCS | Mod: CPTII,S$GLB,, | Performed by: DERMATOLOGY

## 2023-08-17 PROCEDURE — 1159F PR MEDICATION LIST DOCUMENTED IN MEDICAL RECORD: ICD-10-PCS | Mod: CPTII,S$GLB,, | Performed by: DERMATOLOGY

## 2023-08-17 PROCEDURE — 99213 PR OFFICE/OUTPT VISIT, EST, LEVL III, 20-29 MIN: ICD-10-PCS | Mod: S$GLB,,, | Performed by: DERMATOLOGY

## 2023-08-17 PROCEDURE — 1160F RVW MEDS BY RX/DR IN RCRD: CPT | Mod: CPTII,S$GLB,, | Performed by: DERMATOLOGY

## 2023-08-17 PROCEDURE — 99213 OFFICE O/P EST LOW 20 MIN: CPT | Mod: S$GLB,,, | Performed by: DERMATOLOGY

## 2023-08-17 PROCEDURE — 4010F ACE/ARB THERAPY RXD/TAKEN: CPT | Mod: CPTII,S$GLB,, | Performed by: DERMATOLOGY

## 2023-08-17 PROCEDURE — 1159F MED LIST DOCD IN RCRD: CPT | Mod: CPTII,S$GLB,, | Performed by: DERMATOLOGY

## 2023-08-17 PROCEDURE — 1160F PR REVIEW ALL MEDS BY PRESCRIBER/CLIN PHARMACIST DOCUMENTED: ICD-10-PCS | Mod: CPTII,S$GLB,, | Performed by: DERMATOLOGY

## 2023-08-17 RX ORDER — METRONIDAZOLE 7.5 MG/G
GEL TOPICAL
Qty: 45 G | Refills: 5 | Status: SHIPPED | OUTPATIENT
Start: 2023-08-17 | End: 2024-01-10

## 2023-08-17 NOTE — PROGRESS NOTES
"    Patient Information  Name: Rl Grubbs  : 1975  MRN: [id number]     Referring Physician:  No ref. provider found   Primary Care Physician:  Tomer Roberto MD   Date of Visit: 2023      Subjective:     History of Present lllness:    Rl Grubbs is a 47 y.o. male who presents with a chief complaint of rash and moles.  Patient is here today for a "mole" check.   Denies any new, changing, or symptomatic lesions on the skin. No change in size, shape, or color. No itching, bleeding, pain, or rapid growth.    Rash  Location: right cheek  Duration: few days, not as prominent today  Signs/Symptoms: red, bumpy, warm to the touch; not itchy, not painful, not blistering; sunglasses touch there  Relieving factors/Prior treatments: sania's butt paste    Patient was last seen: 2022.  Prior notes by myself reviewed.   Clinical documentation obtained by nursing staff reviewed.    Review of Systems    Objective:   Physical Exam   Constitutional: He appears well-developed and well-nourished. No distress.   HENT:   Mouth/Throat: Lips normal.    Eyes: Lids are normal.  No conjunctival no injection.   Neurological: He is alert and oriented to person, place, and time. He is not disoriented.   Psychiatric: He has a normal mood and affect.   Skin:   Areas Examined (abnormalities noted in diagram):   Scalp / Hair Palpated and Inspected  Head / Face Inspection Performed  Neck Inspection Performed  Chest / Axilla Inspection Performed  Abdomen Inspection Performed  Genitals / Buttocks / Groin Inspection Performed  Back Inspection Performed  RUE Inspected  LUE Inspection Performed  RLE Inspected  LLE Inspection Performed  Nails and Digits Inspection Performed                 Diagram Legend     Erythematous scaling macule/papule c/w actinic keratosis       Vascular papule c/w angioma      Pigmented verrucoid papule/plaque c/w seborrheic keratosis      Yellow umbilicated papule c/w sebaceous hyperplasia "      Irregularly shaped tan macule c/w lentigo     1-2 mm smooth white papules consistent with Milia      Movable subcutaneous cyst with punctum c/w epidermal inclusion cyst      Subcutaneous movable cyst c/w pilar cyst      Firm pink to brown papule c/w dermatofibroma      Pedunculated fleshy papule(s) c/w skin tag(s)      Evenly pigmented macule c/w junctional nevus     Mildly variegated pigmented, slightly irregular-bordered macule c/w mildly atypical nevus      Flesh colored to evenly pigmented papule c/w intradermal nevus       Pink pearly papule/plaque c/w basal cell carcinoma      Erythematous hyperkeratotic cursted plaque c/w SCC      Surgical scar with no sign of skin cancer recurrence      Open and closed comedones      Inflammatory papules and pustules      Verrucoid papule consistent consistent with wart     Erythematous eczematous patches and plaques     Dystrophic onycholytic nail with subungual debris c/w onychomycosis     Umbilicated papule    Erythematous-base heme-crusted tan verrucoid plaque consistent with inflamed seborrheic keratosis     Erythematous Silvery Scaling Plaque c/w Psoriasis     See annotation    No images are attached to the encounter or orders placed in the encounter.      [] Data reviewed  [] Prior external notes reviewed  [] Independent review of test  [] Management discussed with another provider  [] Independent historian    Assessment / Plan:        Periorificial dermatitis  Discontinue any topical, nasal, or inhaled corticosteroids.  Discontinue applying occlusive face creams, including cosmetics and sunscreens.  Avoid cinnamon.  Trial of [employer] Silly Strawberry fluoride-free toothpaste or Cleure Original flavor-free toothpaste.  Recommended washing with lukewarm water alone or using a mild, fragrance-free cleanser.  -     metroNIDAZOLE (METROGEL) 0.75 % gel; Apply to affected areas face BID.  Dispense: 45 g; Refill: 5    Melanocytic nevi of trunk  Multiple  benign-appearing nevi present on exam today. Reassurance provided. Counseled patient to periodically examine moles and return to clinic if any changes in size, shape, or color are noted or if it becomes symptomatic (bleeding, itching, pain, etc).  Recommend using a broad-spectrum, water-resistant sunscreen with SPF of 30 or higher--reapply every 2 hours. Seek shade, wear sun-protective clothing, and perform regular skin self-exams.    Lentigines  These are benign sun spots which should be monitored for changes. Daily sun protection will reduce the number of new lesions.   Recommend using a broad-spectrum, water-resistant sunscreen with SPF of 30 or higher--reapply every 2 hours. Seek shade, wear sun-protective clothing, and perform regular skin self-exams.    Seborrheic keratosis  These are benign, inherited growths without a malignant potential. Reassurance given to patient. No treatment is necessary.    Skin cancer screening  Total body skin examination performed today as noted in physical exam. No lesions suspicious for malignancy were seen.  Recommend using a broad-spectrum, water-resistant sunscreen with SPF of 30 or higher--reapply every 2 hours. Seek shade, wear sun-protective clothing, and perform regular skin self-exams.      Follow up in about 2 months if symptoms worsen or fail to improve.      Jennifer Gilmore MD, FAAD  [facility]

## 2023-10-09 ENCOUNTER — OFFICE VISIT (OUTPATIENT)
Dept: PRIMARY CARE CLINIC | Facility: CLINIC | Age: 48
End: 2023-10-09
Payer: COMMERCIAL

## 2023-10-09 VITALS
HEIGHT: 71 IN | BODY MASS INDEX: 37.38 KG/M2 | DIASTOLIC BLOOD PRESSURE: 80 MMHG | HEART RATE: 68 BPM | TEMPERATURE: 97 F | WEIGHT: 267 LBS | SYSTOLIC BLOOD PRESSURE: 120 MMHG | OXYGEN SATURATION: 98 % | RESPIRATION RATE: 18 BRPM

## 2023-10-09 DIAGNOSIS — M47.816 LUMBAR SPONDYLOSIS: ICD-10-CM

## 2023-10-09 DIAGNOSIS — Z00.00 ANNUAL PHYSICAL EXAM: Primary | ICD-10-CM

## 2023-10-09 DIAGNOSIS — F33.0 DEPRESSION, MAJOR, RECURRENT, MILD: ICD-10-CM

## 2023-10-09 DIAGNOSIS — Z12.5 PROSTATE CANCER SCREENING: ICD-10-CM

## 2023-10-09 PROCEDURE — 1160F PR REVIEW ALL MEDS BY PRESCRIBER/CLIN PHARMACIST DOCUMENTED: ICD-10-PCS | Mod: CPTII,S$GLB,, | Performed by: INTERNAL MEDICINE

## 2023-10-09 PROCEDURE — 3074F PR MOST RECENT SYSTOLIC BLOOD PRESSURE < 130 MM HG: ICD-10-PCS | Mod: CPTII,S$GLB,, | Performed by: INTERNAL MEDICINE

## 2023-10-09 PROCEDURE — 99999 PR PBB SHADOW E&M-EST. PATIENT-LVL IV: CPT | Mod: PBBFAC,,, | Performed by: INTERNAL MEDICINE

## 2023-10-09 PROCEDURE — 1159F MED LIST DOCD IN RCRD: CPT | Mod: CPTII,S$GLB,, | Performed by: INTERNAL MEDICINE

## 2023-10-09 PROCEDURE — 3074F SYST BP LT 130 MM HG: CPT | Mod: CPTII,S$GLB,, | Performed by: INTERNAL MEDICINE

## 2023-10-09 PROCEDURE — 3008F PR BODY MASS INDEX (BMI) DOCUMENTED: ICD-10-PCS | Mod: CPTII,S$GLB,, | Performed by: INTERNAL MEDICINE

## 2023-10-09 PROCEDURE — 3079F DIAST BP 80-89 MM HG: CPT | Mod: CPTII,S$GLB,, | Performed by: INTERNAL MEDICINE

## 2023-10-09 PROCEDURE — 99999 PR PBB SHADOW E&M-EST. PATIENT-LVL IV: ICD-10-PCS | Mod: PBBFAC,,, | Performed by: INTERNAL MEDICINE

## 2023-10-09 PROCEDURE — 99396 PR PREVENTIVE VISIT,EST,40-64: ICD-10-PCS | Mod: S$GLB,,, | Performed by: INTERNAL MEDICINE

## 2023-10-09 PROCEDURE — 1160F RVW MEDS BY RX/DR IN RCRD: CPT | Mod: CPTII,S$GLB,, | Performed by: INTERNAL MEDICINE

## 2023-10-09 PROCEDURE — 99396 PREV VISIT EST AGE 40-64: CPT | Mod: S$GLB,,, | Performed by: INTERNAL MEDICINE

## 2023-10-09 PROCEDURE — 3008F BODY MASS INDEX DOCD: CPT | Mod: CPTII,S$GLB,, | Performed by: INTERNAL MEDICINE

## 2023-10-09 PROCEDURE — 3079F PR MOST RECENT DIASTOLIC BLOOD PRESSURE 80-89 MM HG: ICD-10-PCS | Mod: CPTII,S$GLB,, | Performed by: INTERNAL MEDICINE

## 2023-10-09 PROCEDURE — 4010F PR ACE/ARB THEARPY RXD/TAKEN: ICD-10-PCS | Mod: CPTII,S$GLB,, | Performed by: INTERNAL MEDICINE

## 2023-10-09 PROCEDURE — 1159F PR MEDICATION LIST DOCUMENTED IN MEDICAL RECORD: ICD-10-PCS | Mod: CPTII,S$GLB,, | Performed by: INTERNAL MEDICINE

## 2023-10-09 PROCEDURE — 4010F ACE/ARB THERAPY RXD/TAKEN: CPT | Mod: CPTII,S$GLB,, | Performed by: INTERNAL MEDICINE

## 2023-10-09 RX ORDER — IBUPROFEN 800 MG/1
800 TABLET ORAL EVERY 6 HOURS PRN
Qty: 60 TABLET | Refills: 0 | Status: SHIPPED | OUTPATIENT
Start: 2023-10-09

## 2023-10-09 NOTE — PROGRESS NOTES
[facility] Note    Chief Complaint      Chief Complaint   Patient presents with    Annual Exam       History of Present Illness      Rl Grubbs is a 47 y.o. male who presents today for   Chief Complaint   Patient presents with    Annual Exam   .  Patient comes to appointment here for annual preventative visit with me . He needs full screening labs . He admits he has been off his diet currently . Has gained some weight as well . He will be getting back on his regular diet and exercise .     HPI    No problem-specific Assessment & Plan notes found for this encounter.       Problem List Items Addressed This Visit          Neuro    Lumbar spondylosis    Overview     Refill ibuprofen             Psychiatric    Depression, major, recurrent, mild    Overview     Cont welbutrin xl 300 mg is stable on this is off lexapro . Is reconciling with his wife .              Renal/    Prostate cancer screening    Overview     c heck psa             Other    Annual physical exam - Primary    Overview     pe documented needs full screening labs              Past Medical History:  Past Medical History:   Diagnosis Date    Hypertension        Past Surgical History:  Past Surgical History:   Procedure Laterality Date    CHOLECYSTECTOMY  7-28-21    HERNIA REPAIR  5-    ROBOT-ASSISTED CHOLECYSTECTOMY USING DA LORENE XI N/A 07/28/2021    Procedure: XI ROBOTIC CHOLECYSTECTOMY;  Surgeon: Brett Gastelum MD;  Location: [address];  Service: General;  Laterality: N/A;    ROBOT-ASSISTED LAPAROSCOPIC REPAIR OF EPIGASTRIC HERNIA N/A 07/28/2021    Procedure: ROBOTIC REPAIR, HERNIA, EPIGASTRIC;  Surgeon: Brett Gastelum MD;  Location: [address];  Service: General;  Laterality: N/A;  Incarcerated visceral hernia repair         Family History:  family history includes Asthma in his mother; COPD in his mother; Cancer in his father; Diabetes in his brother; Hearing loss in his mother; Heart  disease in his brother and mother; Hypertension in his brother and mother; Kidney disease in his mother.     Social History:  Social History     Socioeconomic History    Marital status:    Tobacco Use    Smoking status: Never    Smokeless tobacco: Never   Substance and Sexual Activity    Alcohol use: Yes     Alcohol/week: 6.0 standard drinks of alcohol     Types: 6 Cans of beer per week     Comment: Socially    Drug use: Never    Sexual activity: Yes     Partners: Female     Birth control/protection: Post-menopausal, None     Social Determinants of Health     Financial Resource Strain: Low Risk  (8/4/2023)    Overall Financial Resource Strain (CARDIA)     Difficulty of Paying Living Expenses: Not hard at all   Food Insecurity: No Food Insecurity (8/4/2023)    Hunger Vital Sign     Worried About Running Out of Food in the Last Year: Never true     Ran Out of Food in the Last Year: Never true   Transportation Needs: No Transportation Needs (8/4/2023)    PRAPARE - Transportation     Lack of Transportation (Medical): No     Lack of Transportation (Non-Medical): No   Physical Activity: Insufficiently Active (8/4/2023)    Exercise Vital Sign     Days of Exercise per Week: 2 days     Minutes of Exercise per Session: 60 min   Stress: No Stress Concern Present (8/4/2023)    [place] [employer] - Occupational Stress Questionnaire     Feeling of Stress : Only a little   Social Connections: Unknown (8/4/2023)    Social Connection and Isolation Panel [NHANES]     Frequency of Communication with Friends and Family: Three times a week     Frequency of Social Gatherings with Friends and Family: Once a week     Active Member of Clubs or Organizations: No     Attends Club or Organization Meetings: Patient refused     Marital Status:    Housing Stability: Low Risk  (8/4/2023)    Housing Stability Vital Sign     Unable to Pay for Housing in the Last Year: No     Number of Places Lived in the Last  Year: 1     Unstable Housing in the Last Year: No       Review of Systems:   Review of Systems   Constitutional:  Negative for fever and weight loss.   HENT:  Negative for congestion, hearing loss and sore throat.    Eyes:  Negative for blurred vision.   Respiratory:  Negative for cough and shortness of breath.    Cardiovascular:  Negative for chest pain, palpitations, claudication and leg swelling.   Gastrointestinal:  Negative for abdominal pain, constipation, diarrhea and heartburn.   Genitourinary:  Negative for dysuria.   Musculoskeletal:  Negative for back pain and myalgias.   Skin:  Negative for rash.   Neurological:  Negative for focal weakness and headaches.   Psychiatric/Behavioral:  Negative for depression and suicidal ideas. The patient is not nervous/anxious.         Medications:  Outpatient Encounter Medications as of 10/9/2023   Medication Sig Dispense Refill    buPROPion (WELLBUTRIN XL) 300 MG 24 hr tablet Take 1 tablet (300 mg total) by mouth once daily. 90 tablet 1    cetirizine (ZYRTEC) 10 MG tablet Take 10 mg by mouth once daily.      esomeprazole (NEXIUM) 40 MG capsule TAKE 1 CAPSULE(40 MG) BY MOUTH EVERY DAY 90 capsule 3    fenofibrate (TRICOR) 145 MG tablet Take 1 tablet (145 mg total) by mouth once daily. 90 tablet 3    irbesartan (AVAPRO) 300 MG tablet TAKE 1 TABLET(300 MG) BY MOUTH EVERY DAY 90 tablet 1    metroNIDAZOLE (METROGEL) 0.75 % gel Apply to affected areas face BID. 45 g 5    omega 3-dha-epa-fish oil 1,000 mg (120 mg-180 mg) Cap Take 1,000 mg by mouth once daily.      [DISCONTINUED] ibuprofen (ADVIL,MOTRIN) 800 MG tablet Take 1 tablet (800 mg total) by mouth every 6 (six) hours as needed for Pain. 60 tablet 0    ibuprofen (ADVIL,MOTRIN) 800 MG tablet Take 1 tablet (800 mg total) by mouth every 6 (six) hours as needed for Pain. 60 tablet 0     No facility-administered encounter medications on file as of 10/9/2023.       Allergies:  Review of patient's allergies indicates:  No Known  "Allergies      Physical Exam      Vitals:    10/09/23 1053   BP: 120/80   Pulse: 68   Resp: 18   Temp: 97 °F (36.1 °C)        Vital Signs  Temp: 97 °F (36.1 °C)  Temp Source: Oral  Pulse: 68  Resp: 18  SpO2: 98 %  BP: 120/80  BP Location: Right arm  Patient Position: Sitting  Pain Score: 0-No pain  Height and Weight  Height: 5' 11" (180.3 cm)  Weight: 121.1 kg (266 lb 15.6 oz)  BSA (Calculated - sq m): 2.46 sq meters  BMI (Calculated): 37.3  Weight in (lb) to have BMI = 25: 178.9]     Body mass index is 37.24 kg/m².    Physical Exam  Constitutional:       Appearance: He is well-developed.   HENT:      Head: Normocephalic.   Eyes:      Pupils: Pupils are equal, round, and reactive to light.   Neck:      Thyroid: No thyromegaly.   Cardiovascular:      Rate and Rhythm: Normal rate and regular rhythm.      Heart sounds: No murmur heard.     No friction rub. No gallop.   Pulmonary:      Effort: Pulmonary effort is normal.      Breath sounds: Normal breath sounds.   Abdominal:      General: Bowel sounds are normal.      Palpations: Abdomen is soft.   Musculoskeletal:         General: Normal range of motion.      Cervical back: Normal range of motion.   Skin:     General: Skin is warm and dry.   Neurological:      Mental Status: He is alert and oriented to person, place, and time.      Sensory: No sensory deficit.   Psychiatric:         Behavior: Behavior normal.          Laboratory:  CBC:  No results for input(s): "WBC", "RBC", "HGB", "HCT", "PLT", "MCV", "MCH", "MCHC" in the last 2160 hours.  CMP:  No results for input(s): "GLU", "CALCIUM", "ALBUMIN", "PROT", "NA", "K", "CO2", "CL", "BUN", "ALKPHOS", "ALT", "AST", "BILITOT" in the last 2160 hours.    Invalid input(s): "CREATININ"  URINALYSIS:  No results for input(s): "COLORU", "CLARITYU", "SPECGRAV", "PHUR", "PROTEINUA", "GLUCOSEU", "BILIRUBINCON", "BLOODU", "WBCU", "RBCU", "BACTERIA", "MUCUS", "NITRITE", "LEUKOCYTESUR", "UROBILINOGEN", "HYALINECASTS" in the last 2160 " "hours.   LIPIDS:  No results for input(s): "TSH", "HDL", "CHOL", "TRIG", "LDLCALC", "CHOLHDL", "NONHDLCHOL", "TOTALCHOLEST" in the last 2160 hours.  TSH:  No results for input(s): "TSH" in the last 2160 hours.  A1C:  No results for input(s): "HGBA1C" in the last 2160 hours.    Radiology:        Assessment:     Rl Grubbs is a 47 y.o.male with:    Annual physical exam  -     CBC Auto Differential; Future; Expected date: 10/09/2023  -     Comprehensive Metabolic Panel; Future; Expected date: 10/09/2023  -     Lipid Panel; Future; Expected date: 10/09/2023    Depression, major, recurrent, mild    Prostate cancer screening  -     PSA, Screening; Future; Expected date: 10/09/2023    Lumbar spondylosis  -     ibuprofen (ADVIL,MOTRIN) 800 MG tablet; Take 1 tablet (800 mg total) by mouth every 6 (six) hours as needed for Pain.  Dispense: 60 tablet; Refill: 0                Plan:     Problem List Items Addressed This Visit          Neuro    Lumbar spondylosis    Overview     Refill ibuprofen             Psychiatric    Depression, major, recurrent, mild    Overview     Cont welbutrin xl 300 mg is stable on this is off lexapro . Is reconciling with his wife .              Renal/    Prostate cancer screening    Overview     c heck psa             Other    Annual physical exam - Primary    Overview     pe documented needs full screening labs             As above, continue current medications and maintain follow up with specialists.  Return to clinic in 6 months.      Frederick W Dantagnan [facility]                  "

## 2023-10-12 LAB
ALBUMIN SERPL-MCNC: 4.3 G/DL (ref 3.6–5.1)
ALBUMIN/GLOB SERPL: 1.6 (CALC) (ref 1–2.5)
ALP SERPL-CCNC: 42 U/L (ref 36–130)
ALT SERPL-CCNC: 19 U/L (ref 9–46)
AST SERPL-CCNC: 17 U/L (ref 10–40)
BASOPHILS # BLD AUTO: 58 CELLS/UL (ref 0–200)
BASOPHILS NFR BLD AUTO: 0.9 %
BILIRUB SERPL-MCNC: 0.5 MG/DL (ref 0.2–1.2)
BUN SERPL-MCNC: 16 MG/DL (ref 7–25)
BUN/CREAT SERPL: ABNORMAL (CALC) (ref 6–22)
CALCIUM SERPL-MCNC: 9.6 MG/DL (ref 8.6–10.3)
CHLORIDE SERPL-SCNC: 103 MMOL/L (ref 98–110)
CHOLEST SERPL-MCNC: 148 MG/DL
CHOLEST/HDLC SERPL: 3 (CALC)
CO2 SERPL-SCNC: 27 MMOL/L (ref 20–32)
CREAT SERPL-MCNC: 1.14 MG/DL (ref 0.6–1.29)
EGFR: 79 ML/MIN/1.73M2
EOSINOPHIL # BLD AUTO: 250 CELLS/UL (ref 15–500)
EOSINOPHIL NFR BLD AUTO: 3.9 %
ERYTHROCYTE [DISTWIDTH] IN BLOOD BY AUTOMATED COUNT: 13.2 % (ref 11–15)
GLOBULIN SER CALC-MCNC: 2.7 G/DL (CALC) (ref 1.9–3.7)
GLUCOSE SERPL-MCNC: 119 MG/DL (ref 65–99)
HCT VFR BLD AUTO: 45.3 % (ref 38.5–50)
HDLC SERPL-MCNC: 49 MG/DL
HGB BLD-MCNC: 15 G/DL (ref 13.2–17.1)
LDLC SERPL CALC-MCNC: 80 MG/DL (CALC)
LYMPHOCYTES # BLD AUTO: 2074 CELLS/UL (ref 850–3900)
LYMPHOCYTES NFR BLD AUTO: 32.4 %
MCH RBC QN AUTO: 27.7 PG (ref 27–33)
MCHC RBC AUTO-ENTMCNC: 33.1 G/DL (ref 32–36)
MCV RBC AUTO: 83.7 FL (ref 80–100)
MONOCYTES # BLD AUTO: 435 CELLS/UL (ref 200–950)
MONOCYTES NFR BLD AUTO: 6.8 %
NEUTROPHILS # BLD AUTO: 3584 CELLS/UL (ref 1500–7800)
NEUTROPHILS NFR BLD AUTO: 56 %
NONHDLC SERPL-MCNC: 99 MG/DL (CALC)
PLATELET # BLD AUTO: 277 THOUSAND/UL (ref 140–400)
PMV BLD REES-ECKER: 9.3 FL (ref 7.5–12.5)
POTASSIUM SERPL-SCNC: 4.8 MMOL/L (ref 3.5–5.3)
PROT SERPL-MCNC: 7 G/DL (ref 6.1–8.1)
PSA SERPL-MCNC: 0.41 NG/ML
RBC # BLD AUTO: 5.41 MILLION/UL (ref 4.2–5.8)
SODIUM SERPL-SCNC: 138 MMOL/L (ref 135–146)
TRIGL SERPL-MCNC: 103 MG/DL
WBC # BLD AUTO: 6.4 THOUSAND/UL (ref 3.8–10.8)

## 2023-10-12 NOTE — PROGRESS NOTES
Lasb all ok bs is elevated . Igt not dm2 . Rec he get strict as we discussed with diet and exercise will repeat at next visit

## 2023-12-03 NOTE — TELEPHONE ENCOUNTER
No care due was identified.  [facility] Embedded Care Due Messages. Reference number: [id number].   12/03/2023 5:29:35 AM CST

## 2023-12-04 RX ORDER — IRBESARTAN 300 MG/1
TABLET ORAL
Qty: 90 TABLET | Refills: 3 | Status: SHIPPED | OUTPATIENT
Start: 2023-12-04

## 2023-12-04 NOTE — TELEPHONE ENCOUNTER
Refill Decision Note   Rl Grubbs  is requesting a refill authorization.    Brief Assessment and Rationale for Refill:   Approve       Medication Therapy Plan:         Comments:     Note composed:12:19 PM 12/04/2023

## 2023-12-08 ENCOUNTER — OFFICE VISIT (OUTPATIENT)
Dept: URGENT CARE | Facility: CLINIC | Age: 48
End: 2023-12-08
Payer: COMMERCIAL

## 2023-12-08 VITALS
SYSTOLIC BLOOD PRESSURE: 128 MMHG | RESPIRATION RATE: 16 BRPM | DIASTOLIC BLOOD PRESSURE: 84 MMHG | WEIGHT: 259 LBS | BODY MASS INDEX: 36.12 KG/M2 | OXYGEN SATURATION: 95 % | TEMPERATURE: 99 F | HEART RATE: 96 BPM

## 2023-12-08 DIAGNOSIS — U07.1 COVID-19 VIRUS INFECTION: Primary | ICD-10-CM

## 2023-12-08 DIAGNOSIS — U07.1 COVID-19 VIRUS DETECTED: ICD-10-CM

## 2023-12-08 LAB
CTP QC/QA: YES
CTP QC/QA: YES
POC MOLECULAR INFLUENZA A AGN: NEGATIVE
POC MOLECULAR INFLUENZA B AGN: NEGATIVE
SARS-COV-2 AG RESP QL IA.RAPID: POSITIVE

## 2023-12-08 PROCEDURE — 99213 OFFICE O/P EST LOW 20 MIN: CPT | Mod: S$GLB,,, | Performed by: FAMILY MEDICINE

## 2023-12-08 PROCEDURE — 87811 SARS CORONAVIRUS 2 ANTIGEN POCT, MANUAL READ: ICD-10-PCS | Mod: QW,S$GLB,, | Performed by: FAMILY MEDICINE

## 2023-12-08 PROCEDURE — 99213 PR OFFICE/OUTPT VISIT, EST, LEVL III, 20-29 MIN: ICD-10-PCS | Mod: S$GLB,,, | Performed by: FAMILY MEDICINE

## 2023-12-08 PROCEDURE — 87811 SARS-COV-2 COVID19 W/OPTIC: CPT | Mod: QW,S$GLB,, | Performed by: FAMILY MEDICINE

## 2023-12-08 PROCEDURE — 87502 POCT INFLUENZA A/B MOLECULAR: ICD-10-PCS | Mod: QW,S$GLB,, | Performed by: FAMILY MEDICINE

## 2023-12-08 PROCEDURE — 87502 INFLUENZA DNA AMP PROBE: CPT | Mod: QW,S$GLB,, | Performed by: FAMILY MEDICINE

## 2023-12-08 RX ORDER — BENZONATATE 100 MG/1
100 CAPSULE ORAL EVERY 6 HOURS PRN
Qty: 30 CAPSULE | Refills: 1 | Status: SHIPPED | OUTPATIENT
Start: 2023-12-08 | End: 2024-01-10

## 2023-12-08 NOTE — PROGRESS NOTES
"Subjective:      Patient ID: Rl Grubbs is a 47 y.o. male.    Vitals:  weight is 117.5 kg (259 lb). His oral temperature is 98.7 °F (37.1 °C). His blood pressure is 128/84 and his pulse is 96. His respiration is 16 and oxygen saturation is 95%.     Chief Complaint: Cough    This is a 47 y.o. male who presents today with a chief complaint of wet cough, "fever" (highest temp = 99.0), nasal congestion, runny nose, sore throat, body aches and headache (only last night) since yesterdays. Pt also presents with ear px or ear congestion. No nausea, vomiting, diarrhea, or abd px     Home tx: tylenol, nyquil    PMH: HTN, sleep apnea    Cough  This is a new problem. The current episode started yesterday. The problem has been gradually worsening. The problem occurs constantly. The cough is Productive of sputum. Associated symptoms include chills, ear congestion, ear pain, headaches, myalgias, nasal congestion, postnasal drip, rhinorrhea and a sore throat. Pertinent negatives include no chest pain or rash. His past medical history is significant for environmental allergies and pneumonia. There is no history of asthma or bronchitis.       Constitution: Positive for chills.   HENT:  Positive for ear pain, postnasal drip and sore throat.    Cardiovascular:  Negative for chest pain.   Respiratory:  Positive for cough.    Musculoskeletal:  Positive for muscle ache.   Skin:  Negative for rash.   Allergic/Immunologic: Positive for environmental allergies.   Neurological:  Positive for headaches.      Objective:     Physical Exam   Constitutional: He appears ill. No distress. obesity  HENT:   Nose: Congestion present.   Mouth/Throat: Mucous membranes are moist.   Cardiovascular: Normal rate, regular rhythm, normal heart sounds and normal pulses.   Pulmonary/Chest: Effort normal and breath sounds normal.   Abdominal: Normal appearance.   Neurological: He is alert.   Nursing note and vitals reviewed.    Results for orders placed or " performed in visit on 12/08/23   POCT Influenza A/B MOLECULAR   Result Value Ref Range    POC Molecular Influenza A Ag Negative Negative, Not Reported    POC Molecular Influenza B Ag Negative Negative, Not Reported     Acceptable Yes    SARS Coronavirus 2 Antigen, POCT Manual Read   Result Value Ref Range    SARS Coronavirus 2 Antigen Positive (A) Negative     Acceptable Yes       Assessment:     1. COVID-19 virus infection        Plan:       COVID-19 virus infection  -     POCT Influenza A/B MOLECULAR  -     SARS Coronavirus 2 Antigen, POCT Manual Read  -     nirmatrelvir-ritonavir 300 mg (150 mg x 2)-100 mg copackaged tablets (EUA); Take 3 tablets by mouth 2 (two) times daily for 5 days. Each dose contains 2 nirmatrelvir (pink tablets) and 1 ritonavir (white tablet). Take all 3 tablets together  Dispense: 30 tablet; Refill: 0  -     benzonatate (TESSALON PERLES) 100 MG capsule; Take 1 capsule (100 mg total) by mouth every 6 (six) hours as needed for Cough.  Dispense: 30 capsule; Refill: 1    OTC cough and cold remedies. Discussed symptom monitoring, contact notification and isolation period of 5 days

## 2023-12-11 ENCOUNTER — PATIENT MESSAGE (OUTPATIENT)
Dept: RESEARCH | Facility: HOSPITAL | Age: 48
End: 2023-12-11
Payer: COMMERCIAL

## 2023-12-18 ENCOUNTER — PATIENT MESSAGE (OUTPATIENT)
Dept: ADMINISTRATIVE | Facility: OTHER | Age: 48
End: 2023-12-18
Payer: COMMERCIAL

## 2023-12-30 ENCOUNTER — PATIENT MESSAGE (OUTPATIENT)
Dept: PRIMARY CARE CLINIC | Facility: CLINIC | Age: 48
End: 2023-12-30
Payer: COMMERCIAL

## 2024-01-08 PROBLEM — Z00.00 ANNUAL PHYSICAL EXAM: Status: RESOLVED | Noted: 2020-09-10 | Resolved: 2024-01-08

## 2024-01-10 ENCOUNTER — OFFICE VISIT (OUTPATIENT)
Dept: PRIMARY CARE CLINIC | Facility: CLINIC | Age: 49
End: 2024-01-10
Payer: COMMERCIAL

## 2024-01-10 VITALS
DIASTOLIC BLOOD PRESSURE: 80 MMHG | OXYGEN SATURATION: 97 % | WEIGHT: 268.94 LBS | TEMPERATURE: 98 F | BODY MASS INDEX: 37.65 KG/M2 | RESPIRATION RATE: 18 BRPM | HEIGHT: 71 IN | HEART RATE: 96 BPM | SYSTOLIC BLOOD PRESSURE: 122 MMHG

## 2024-01-10 DIAGNOSIS — I10 BENIGN ESSENTIAL HYPERTENSION: Primary | ICD-10-CM

## 2024-01-10 DIAGNOSIS — E66.01 SEVERE OBESITY (BMI 35.0-39.9) WITH COMORBIDITY: ICD-10-CM

## 2024-01-10 DIAGNOSIS — F33.0 DEPRESSION, MAJOR, RECURRENT, MILD: ICD-10-CM

## 2024-01-10 PROCEDURE — 3008F BODY MASS INDEX DOCD: CPT | Mod: CPTII,S$GLB,, | Performed by: INTERNAL MEDICINE

## 2024-01-10 PROCEDURE — 99999 PR PBB SHADOW E&M-EST. PATIENT-LVL IV: CPT | Mod: PBBFAC,,, | Performed by: INTERNAL MEDICINE

## 2024-01-10 PROCEDURE — 3079F DIAST BP 80-89 MM HG: CPT | Mod: CPTII,S$GLB,, | Performed by: INTERNAL MEDICINE

## 2024-01-10 PROCEDURE — 1159F MED LIST DOCD IN RCRD: CPT | Mod: CPTII,S$GLB,, | Performed by: INTERNAL MEDICINE

## 2024-01-10 PROCEDURE — 1160F RVW MEDS BY RX/DR IN RCRD: CPT | Mod: CPTII,S$GLB,, | Performed by: INTERNAL MEDICINE

## 2024-01-10 PROCEDURE — 3074F SYST BP LT 130 MM HG: CPT | Mod: CPTII,S$GLB,, | Performed by: INTERNAL MEDICINE

## 2024-01-10 PROCEDURE — 99214 OFFICE O/P EST MOD 30 MIN: CPT | Mod: S$GLB,,, | Performed by: INTERNAL MEDICINE

## 2024-01-10 NOTE — PROGRESS NOTES
[facility] Note    Chief Complaint      Chief Complaint   Patient presents with    Hypertension       History of Present Illness      Rl Grubbs is a 48 y.o. male who presents today for   Chief Complaint   Patient presents with    Hypertension   .  Patient comes to appointment here for elevated blood pressure . See below . Much better     HPI    No problem-specific Assessment & Plan notes found for this encounter.       Problem List Items Addressed This Visit          Psychiatric    Depression, major, recurrent, mild    Overview     Cont welbutrin xl 300 mg is stable on this is off lexapro . Is reconciling with his wife .              Cardiac/Vascular    Benign essential hypertension - Primary    Overview     Had been having some elevated readings while on a trip in [place] admitted to dietary indiscretion during that time .     Bp repeat looks good cont current             Endocrine    Severe obesity (BMI 35.0-39.9) with comorbidity        Past Medical History:  Past Medical History:   Diagnosis Date    Hypertension        Past Surgical History:  Past Surgical History:   Procedure Laterality Date    CHOLECYSTECTOMY  7-28-21    HERNIA REPAIR  5-    ROBOT-ASSISTED CHOLECYSTECTOMY USING DA LORENE XI N/A 07/28/2021    Procedure: XI ROBOTIC CHOLECYSTECTOMY;  Surgeon: Brett Gastelum MD;  Location: [address];  Service: General;  Laterality: N/A;    ROBOT-ASSISTED LAPAROSCOPIC REPAIR OF EPIGASTRIC HERNIA N/A 07/28/2021    Procedure: ROBOTIC REPAIR, HERNIA, EPIGASTRIC;  Surgeon: Brett Gastelum MD;  Location: [facility] OR [address] [place] [address];  Service: General;  Laterality: N/A;  Incarcerated visceral hernia repair         Family History:  family history includes Asthma in his mother; COPD in his mother; Cancer in his father; Diabetes in his brother; Hearing loss in his mother; Heart disease in his brother and mother; Hypertension in his brother and mother; Kidney disease in his mother.      Social History:  Social History     Socioeconomic History    Marital status:    Tobacco Use    Smoking status: Never     Passive exposure: Never    Smokeless tobacco: Never   Substance and Sexual Activity    Alcohol use: Yes     Alcohol/week: 6.0 standard drinks of alcohol     Types: 6 Cans of beer per week     Comment: Socially    Drug use: Never    Sexual activity: Yes     Partners: Female     Birth control/protection: Post-menopausal, None     Social Determinants of Health     Financial Resource Strain: Low Risk  (8/4/2023)    Overall Financial Resource Strain (CARDIA)     Difficulty of Paying Living Expenses: Not hard at all   Food Insecurity: No Food Insecurity (8/4/2023)    Hunger Vital Sign     Worried About Running Out of Food in the Last Year: Never true     Ran Out of Food in the Last Year: Never true   Transportation Needs: No Transportation Needs (8/4/2023)    PRAPARE - Transportation     Lack of Transportation (Medical): No     Lack of Transportation (Non-Medical): No   Physical Activity: Insufficiently Active (8/4/2023)    Exercise Vital Sign     Days of Exercise per Week: 2 days     Minutes of Exercise per Session: 60 min   Stress: No Stress Concern Present (8/4/2023)    [employer] - Occupational Stress Questionnaire     Feeling of Stress : Only a little   Social Connections: Unknown (8/4/2023)    Social Connection and Isolation Panel [NHANES]     Frequency of Communication with Friends and Family: Three times a week     Frequency of Social Gatherings with Friends and Family: Once a week     Active Member of Clubs or Organizations: No     Attends Club or Organization Meetings: Patient declined     Marital Status:    Housing Stability: Low Risk  (8/4/2023)    Housing Stability Vital Sign     Unable to Pay for Housing in the Last Year: No     Number of Places Lived in the Last Year: 1     Unstable Housing in the Last Year: No       Review of Systems:    Review of Systems   Constitutional:  Negative for fever and weight loss.   HENT:  Negative for congestion, hearing loss and sore throat.    Eyes:  Negative for blurred vision.   Respiratory:  Negative for cough and shortness of breath.    Cardiovascular:  Negative for chest pain, palpitations, claudication and leg swelling.   Gastrointestinal:  Negative for abdominal pain, constipation, diarrhea and heartburn.   Genitourinary:  Negative for dysuria.   Musculoskeletal:  Negative for back pain and myalgias.   Skin:  Negative for rash.   Neurological:  Negative for focal weakness and headaches.   Psychiatric/Behavioral:  Negative for depression and suicidal ideas. The patient is not nervous/anxious.         Medications:  Outpatient Encounter Medications as of 1/10/2024   Medication Sig Dispense Refill    buPROPion (WELLBUTRIN XL) 300 MG 24 hr tablet Take 1 tablet (300 mg total) by mouth once daily. 90 tablet 1    cetirizine (ZYRTEC) 10 MG tablet Take 10 mg by mouth once daily.      esomeprazole (NEXIUM) 40 MG capsule TAKE 1 CAPSULE(40 MG) BY MOUTH EVERY DAY 90 capsule 3    fenofibrate (TRICOR) 145 MG tablet Take 1 tablet (145 mg total) by mouth once daily. 90 tablet 3    ibuprofen (ADVIL,MOTRIN) 800 MG tablet Take 1 tablet (800 mg total) by mouth every 6 (six) hours as needed for Pain. 60 tablet 0    irbesartan (AVAPRO) 300 MG tablet TAKE 1 TABLET(300 MG) BY MOUTH EVERY DAY 90 tablet 3    omega 3-dha-epa-fish oil 1,000 mg (120 mg-180 mg) Cap Take 1,000 mg by mouth once daily.      [DISCONTINUED] benzonatate (TESSALON PERLES) 100 MG capsule Take 1 capsule (100 mg total) by mouth every 6 (six) hours as needed for Cough. (Patient not taking: Reported on 1/10/2024) 30 capsule 1    [DISCONTINUED] metroNIDAZOLE (METROGEL) 0.75 % gel Apply to affected areas face BID. (Patient not taking: Reported on 1/10/2024) 45 g 5     No facility-administered encounter medications on file as of 1/10/2024.       Allergies:  Review of  "patient's allergies indicates:  No Known Allergies      Physical Exam      Vitals:    01/10/24 1050   BP: 122/80   Pulse: 96   Resp: 18   Temp: 98 °F (36.7 °C)        Vital Signs  Temp: 98 °F (36.7 °C)  Temp Source: Oral  Pulse: 96  Resp: 18  SpO2: 97 %  BP: 122/80  BP Location: Right arm  Patient Position: Sitting  Pain Score: 0-No pain  Height and Weight  Height: 5' 11" (180.3 cm)  Weight: 122 kg (268 lb 15.4 oz)  BSA (Calculated - sq m): 2.47 sq meters  BMI (Calculated): 37.5  Weight in (lb) to have BMI = 25: 178.9]     Body mass index is 37.51 kg/m².    Physical Exam  Constitutional:       Appearance: He is well-developed.   HENT:      Head: Normocephalic.   Eyes:      Pupils: Pupils are equal, round, and reactive to light.   Neck:      Thyroid: No thyromegaly.   Cardiovascular:      Rate and Rhythm: Normal rate and regular rhythm.      Heart sounds: No murmur heard.     No friction rub. No gallop.   Pulmonary:      Effort: Pulmonary effort is normal.      Breath sounds: Normal breath sounds.   Abdominal:      General: Bowel sounds are normal.      Palpations: Abdomen is soft.   Musculoskeletal:         General: Normal range of motion.      Cervical back: Normal range of motion.   Skin:     General: Skin is warm and dry.   Neurological:      Mental Status: He is alert and oriented to person, place, and time.      Sensory: No sensory deficit.   Psychiatric:         Behavior: Behavior normal.          Laboratory:  CBC:  No results for input(s): "WBC", "RBC", "HGB", "HCT", "PLT", "MCV", "MCH", "MCHC" in the last 2160 hours.  CMP:  No results for input(s): "GLU", "CALCIUM", "ALBUMIN", "PROT", "NA", "K", "CO2", "CL", "BUN", "ALKPHOS", "ALT", "AST", "BILITOT" in the last 2160 hours.    Invalid input(s): "CREATININ"  URINALYSIS:  No results for input(s): "COLORU", "CLARITYU", "SPECGRAV", "PHUR", "PROTEINUA", "GLUCOSEU", "BILIRUBINCON", "BLOODU", "WBCU", "RBCU", "BACTERIA", "MUCUS", "NITRITE", "LEUKOCYTESUR", " ""UROBILINOGEN", "HYALINECASTS" in the last 2160 hours.   LIPIDS:  No results for input(s): "TSH", "HDL", "CHOL", "TRIG", "LDLCALC", "CHOLHDL", "NONHDLCHOL", "TOTALCHOLEST" in the last 2160 hours.  TSH:  No results for input(s): "TSH" in the last 2160 hours.  A1C:  No results for input(s): "HGBA1C" in the last 2160 hours.    Radiology:        Assessment:     Rl Grubbs is a 48 y.o.male with:    Benign essential hypertension    Severe obesity (BMI 35.0-39.9) with comorbidity    Depression, major, recurrent, mild                Plan:     Problem List Items Addressed This Visit          Psychiatric    Depression, major, recurrent, mild    Overview     Cont welbutrin xl 300 mg is stable on this is off lexapro . Is reconciling with his wife .              Cardiac/Vascular    Benign essential hypertension - Primary    Overview     Had been having some elevated readings while on a trip in [place] admitted to dietary indiscretion during that time .     Bp repeat looks good cont current             Endocrine    Severe obesity (BMI 35.0-39.9) with comorbidity       As above, continue current medications and maintain follow up with specialists.  Return to clinic as scheduled     '  Frederick W Dantagnan [facility]                  "

## 2024-02-11 DIAGNOSIS — E78.2 MIXED HYPERLIPIDEMIA: ICD-10-CM

## 2024-02-11 DIAGNOSIS — E78.1 PURE HYPERTRIGLYCERIDEMIA: ICD-10-CM

## 2024-02-11 DIAGNOSIS — F33.0 DEPRESSION, MAJOR, RECURRENT, MILD: ICD-10-CM

## 2024-02-11 NOTE — TELEPHONE ENCOUNTER
No care due was identified.  [facility] Embedded Care Due Messages. Reference number: [id number].   2/11/2024 10:32:46 AM CST

## 2024-02-12 RX ORDER — FENOFIBRATE 145 MG/1
145 TABLET, FILM COATED ORAL DAILY
Qty: 90 TABLET | Refills: 3 | Status: SHIPPED | OUTPATIENT
Start: 2024-02-12 | End: 2025-02-11

## 2024-02-14 ENCOUNTER — PATIENT MESSAGE (OUTPATIENT)
Dept: PSYCHIATRY | Facility: CLINIC | Age: 49
End: 2024-02-14
Payer: COMMERCIAL

## 2024-02-14 RX ORDER — BUPROPION HYDROCHLORIDE 300 MG/1
300 TABLET ORAL DAILY
Qty: 30 TABLET | Refills: 0 | Status: SHIPPED | OUTPATIENT
Start: 2024-02-14 | End: 2024-04-16

## 2024-02-24 ENCOUNTER — OFFICE VISIT (OUTPATIENT)
Dept: URGENT CARE | Facility: CLINIC | Age: 49
End: 2024-02-24
Payer: COMMERCIAL

## 2024-02-24 VITALS
WEIGHT: 264.75 LBS | RESPIRATION RATE: 17 BRPM | DIASTOLIC BLOOD PRESSURE: 82 MMHG | TEMPERATURE: 98 F | OXYGEN SATURATION: 96 % | HEART RATE: 93 BPM | BODY MASS INDEX: 36.93 KG/M2 | SYSTOLIC BLOOD PRESSURE: 132 MMHG

## 2024-02-24 DIAGNOSIS — B96.89 BACTERIAL SINUSITIS: Primary | ICD-10-CM

## 2024-02-24 DIAGNOSIS — J32.9 BACTERIAL SINUSITIS: Primary | ICD-10-CM

## 2024-02-24 DIAGNOSIS — R05.2 SUBACUTE COUGH: ICD-10-CM

## 2024-02-24 PROCEDURE — 96372 THER/PROPH/DIAG INJ SC/IM: CPT | Mod: S$GLB,,, | Performed by: NURSE PRACTITIONER

## 2024-02-24 PROCEDURE — 99213 OFFICE O/P EST LOW 20 MIN: CPT | Mod: 25,S$GLB,, | Performed by: NURSE PRACTITIONER

## 2024-02-24 RX ORDER — AMOXICILLIN AND CLAVULANATE POTASSIUM 875; 125 MG/1; MG/1
1 TABLET, FILM COATED ORAL EVERY 12 HOURS
Qty: 14 TABLET | Refills: 0 | Status: SHIPPED | OUTPATIENT
Start: 2024-02-24 | End: 2024-03-02

## 2024-02-24 RX ORDER — METHYLPREDNISOLONE 4 MG/1
TABLET ORAL
Qty: 21 EACH | Refills: 0 | Status: SHIPPED | OUTPATIENT
Start: 2024-02-26 | End: 2024-04-16

## 2024-02-24 RX ORDER — DEXAMETHASONE SODIUM PHOSPHATE 100 MG/10ML
10 INJECTION INTRAMUSCULAR; INTRAVENOUS
Status: COMPLETED | OUTPATIENT
Start: 2024-02-24 | End: 2024-02-24

## 2024-02-24 RX ORDER — PROMETHAZINE HYDROCHLORIDE AND DEXTROMETHORPHAN HYDROBROMIDE 6.25; 15 MG/5ML; MG/5ML
5 SYRUP ORAL NIGHTLY PRN
Qty: 118 ML | Refills: 0 | Status: SHIPPED | OUTPATIENT
Start: 2024-02-24 | End: 2024-04-16

## 2024-02-24 RX ORDER — BENZONATATE 200 MG/1
200 CAPSULE ORAL EVERY 8 HOURS PRN
Qty: 30 CAPSULE | Refills: 0 | Status: SHIPPED | OUTPATIENT
Start: 2024-02-24 | End: 2024-02-24 | Stop reason: CLARIF

## 2024-02-24 RX ADMIN — DEXAMETHASONE SODIUM PHOSPHATE 10 MG: 100 INJECTION INTRAMUSCULAR; INTRAVENOUS at 04:02

## 2024-02-24 NOTE — PROGRESS NOTES
Subjective:      Patient ID: Rl Grubbs is a 48 y.o. male.    Vitals:  weight is 120.1 kg (264 lb 12.4 oz). His oral temperature is 98.2 °F (36.8 °C). His blood pressure is 132/82 and his pulse is 93. His respiration is 17 and oxygen saturation is 96%.     Chief Complaint: Cough (Entered by patient)    47 y/o male presents to  today with c/o cough that started a month ago. Has sinus pressure with nasal congestion.    Cough  The current episode started more than 1 month ago. The problem has been unchanged. The problem occurs every few minutes. The cough is Non-productive. Associated symptoms include ear pain and postnasal drip. Pertinent negatives include no chest pain, chills, ear congestion, fever, headaches, heartburn, hemoptysis, myalgias, nasal congestion, rash, rhinorrhea, sore throat, shortness of breath, sweats, weight loss or wheezing. Nothing aggravates the symptoms. He has tried nothing for the symptoms.       Constitution: Negative for chills and fever.   HENT:  Positive for ear pain, congestion, postnasal drip and sinus pressure. Negative for sore throat.    Cardiovascular:  Negative for chest pain.   Respiratory:  Positive for cough. Negative for bloody sputum, shortness of breath and wheezing.    Gastrointestinal:  Negative for heartburn.   Musculoskeletal:  Negative for muscle ache.   Skin:  Negative for rash.   Neurological:  Negative for headaches.      Objective:     Physical Exam   Constitutional: He is oriented to person, place, and time. He appears well-developed. He is cooperative.  Non-toxic appearance. He does not appear ill. No distress.   HENT:   Head: Normocephalic.   Ears:   Right Ear: Hearing, tympanic membrane, external ear and ear canal normal.   Left Ear: Hearing, tympanic membrane, external ear and ear canal normal.   Nose: Mucosal edema and congestion present. No rhinorrhea or nasal deformity. No epistaxis. Right sinus exhibits maxillary sinus tenderness and frontal sinus  tenderness. Left sinus exhibits maxillary sinus tenderness and frontal sinus tenderness.   Mouth/Throat: Uvula is midline, oropharynx is clear and moist and mucous membranes are normal. Mucous membranes are moist. No trismus in the jaw. Normal dentition. No uvula swelling. No oropharyngeal exudate, posterior oropharyngeal edema or posterior oropharyngeal erythema. Oropharynx is clear.   Eyes: Conjunctivae and lids are normal. No scleral icterus.   Neck: Trachea normal and phonation normal. Neck supple. No edema present. No erythema present. No neck rigidity present.   Cardiovascular: Normal rate and regular rhythm.   Pulmonary/Chest: Effort normal and breath sounds normal. No respiratory distress. He has no decreased breath sounds. He has no wheezes. He has no rhonchi.   Abdominal: Normal appearance.   Musculoskeletal: Normal range of motion.         General: No deformity. Normal range of motion.   Neurological: He is alert and oriented to person, place, and time. He exhibits normal muscle tone. Coordination normal.   Skin: Skin is warm, dry, intact, not diaphoretic and not pale.   Psychiatric: His speech is normal and behavior is normal. Judgment and thought content normal.   Nursing note and vitals reviewed.      Assessment:     1. Bacterial sinusitis    2. Subacute cough        Plan:       Bacterial sinusitis  -     dexAMETHasone injection 10 mg  -     amoxicillin-clavulanate 875-125mg (AUGMENTIN) 875-125 mg per tablet; Take 1 tablet by mouth every 12 (twelve) hours. for 7 days  Dispense: 14 tablet; Refill: 0  -     methylPREDNISolone (MEDROL DOSEPACK) 4 mg tablet; use as directed  Dispense: 21 each; Refill: 0    Subacute cough  -     methylPREDNISolone (MEDROL DOSEPACK) 4 mg tablet; use as directed  Dispense: 21 each; Refill: 0  -     promethazine-dextromethorphan (PROMETHAZINE-DM) 6.25-15 mg/5 mL Syrp; Take 5 mLs by mouth nightly as needed (cough).  Dispense: 118 mL; Refill: 0    Other orders  -      Discontinue: benzonatate (TESSALON) 200 MG capsule; Take 1 capsule (200 mg total) by mouth every 8 (eight) hours as needed for Cough.  Dispense: 30 capsule; Refill: 0      Patient Instructions   Oral fluids  Rest  Steam (hot showers, hot tea)  Blow nose often  Avoid circulating air (such as ceiling fans) dries your airway  Avoid drinking cold drinks (worsens cough)  Avoid strong smells which could worsen cough (perfume, lotions, smoke...)  Therapeutic coughing to expel mucous  Sit in upright position often

## 2024-04-08 ENCOUNTER — PATIENT MESSAGE (OUTPATIENT)
Dept: DERMATOLOGY | Facility: CLINIC | Age: 49
End: 2024-04-08
Payer: COMMERCIAL

## 2024-04-09 ENCOUNTER — OFFICE VISIT (OUTPATIENT)
Dept: DERMATOLOGY | Facility: CLINIC | Age: 49
End: 2024-04-09
Payer: COMMERCIAL

## 2024-04-09 DIAGNOSIS — L23.9 ALLERGIC CONTACT DERMATITIS, UNSPECIFIED TRIGGER: ICD-10-CM

## 2024-04-09 DIAGNOSIS — D48.5 NEOPLASM OF UNCERTAIN BEHAVIOR OF SKIN: Primary | ICD-10-CM

## 2024-04-09 DIAGNOSIS — L81.4 LENTIGINES: ICD-10-CM

## 2024-04-09 DIAGNOSIS — Z12.83 SKIN CANCER SCREENING: ICD-10-CM

## 2024-04-09 DIAGNOSIS — B02.9 HERPES ZOSTER WITHOUT COMPLICATION: ICD-10-CM

## 2024-04-09 DIAGNOSIS — D22.9 MULTIPLE BENIGN MELANOCYTIC NEVI: ICD-10-CM

## 2024-04-09 PROCEDURE — 88342 IMHCHEM/IMCYTCHM 1ST ANTB: CPT | Mod: 26,,, | Performed by: PATHOLOGY

## 2024-04-09 PROCEDURE — 88305 TISSUE EXAM BY PATHOLOGIST: CPT | Performed by: PATHOLOGY

## 2024-04-09 PROCEDURE — 4010F ACE/ARB THERAPY RXD/TAKEN: CPT | Mod: CPTII,S$GLB,, | Performed by: DERMATOLOGY

## 2024-04-09 PROCEDURE — 88305 TISSUE EXAM BY PATHOLOGIST: CPT | Mod: 26,,, | Performed by: PATHOLOGY

## 2024-04-09 PROCEDURE — 88312 SPECIAL STAINS GROUP 1: CPT | Mod: 26,,, | Performed by: PATHOLOGY

## 2024-04-09 PROCEDURE — 11102 TANGNTL BX SKIN SINGLE LES: CPT | Mod: S$GLB,,, | Performed by: DERMATOLOGY

## 2024-04-09 PROCEDURE — 1160F RVW MEDS BY RX/DR IN RCRD: CPT | Mod: CPTII,S$GLB,, | Performed by: DERMATOLOGY

## 2024-04-09 PROCEDURE — 1159F MED LIST DOCD IN RCRD: CPT | Mod: CPTII,S$GLB,, | Performed by: DERMATOLOGY

## 2024-04-09 PROCEDURE — 99213 OFFICE O/P EST LOW 20 MIN: CPT | Mod: 25,S$GLB,, | Performed by: DERMATOLOGY

## 2024-04-09 PROCEDURE — 88342 IMHCHEM/IMCYTCHM 1ST ANTB: CPT | Performed by: PATHOLOGY

## 2024-04-09 PROCEDURE — 88312 SPECIAL STAINS GROUP 1: CPT | Performed by: PATHOLOGY

## 2024-04-09 RX ORDER — FLUOCINONIDE 0.5 MG/G
CREAM TOPICAL
Qty: 60 G | Refills: 1 | Status: SHIPPED | OUTPATIENT
Start: 2024-04-09

## 2024-04-09 RX ORDER — VALACYCLOVIR HYDROCHLORIDE 1 G/1
1000 TABLET, FILM COATED ORAL 3 TIMES DAILY
Qty: 21 TABLET | Refills: 0 | Status: SHIPPED | OUTPATIENT
Start: 2024-04-09 | End: 2024-04-16

## 2024-04-09 NOTE — PATIENT INSTRUCTIONS
Biopsy Wound Care Instructions    Leave the bandage on for 24 hours without getting it wet.   Clean the area once a day with a gentle soap and water, then pat dry and apply Vaseline and a bandaid.  The site should be kept moist with Vaseline at all times to improve healing. Reapply a thick coating as needed. Do not let the site air out or form a scab, as this will delay healing and worsen scarring.  If any bleeding or oozing occurs once you return home, apply firm pressure to the area for 30 minutes straight without peeking. If bleeding continues, call the office immediately.  Please message us via [facility], call us at [phone], or return to the office at any sign of increasing redness, swelling, tenderness, pain, heat, yellow drainage/discharge, or continued bleeding.      Receiving Your Pathology Results    Your pathology results will be released to you on [facility] at the same time that Dr. Gilmore receives them.   Dr. Gilmore will then message you with her interpretation of the results and/or with the plan going forward.  If you do not use [facility] or if your pathology results require more of an explanation, you will receive your results via a phone call.  If 2 weeks go by and you have not received your results, please message us via [facility] or call us at [phone] to inform us.

## 2024-04-09 NOTE — PROGRESS NOTES
"  Patient Information  Name: Rl Grubbs  : 1975  MRN: [id number]     Referring Physician:  No ref. provider found   Primary Care Physician:  Tomer Roberto MD   Date of Visit: 2024      Subjective:     History of Present lllness:    Rl Grubbs is a 48 y.o. male who presents with a chief complaint of mole and rash.  Patient is here today for a "mole" check.     Today, patient complains of rash:  Location: left elbow  Duration: 1 week  Symptoms: red, blisters, sores, itching/"ant bite" sensation, itches more at night  Relieving factors/Previous treatments: antiseptic spray    Patient was last seen: 2023.  Prior notes by myself reviewed.   Clinical documentation obtained by nursing staff reviewed.    Review of Systems    Objective:   Physical Exam   Constitutional: He appears well-developed and well-nourished. No distress.   HENT:   Mouth/Throat: Lips normal.    Eyes: Lids are normal.  No conjunctival no injection.   Neurological: He is alert and oriented to person, place, and time. He is not disoriented.   Psychiatric: He has a normal mood and affect.   Skin:   Areas Examined (abnormalities noted in diagram):   Scalp / Hair Palpated and Inspected  Head / Face Inspection Performed  Neck Inspection Performed  Chest / Axilla Inspection Performed  Abdomen Inspection Performed  Back Inspection Performed  RUE Inspected  LUE Inspection Performed  Nails and Digits Inspection Performed                 Diagram Legend     Erythematous scaling macule/papule c/w actinic keratosis       Vascular papule c/w angioma      Pigmented verrucoid papule/plaque c/w seborrheic keratosis      Yellow umbilicated papule c/w sebaceous hyperplasia      Irregularly shaped tan macule c/w lentigo     1-2 mm smooth white papules consistent with Milia      Movable subcutaneous cyst with punctum c/w epidermal inclusion cyst      Subcutaneous movable cyst c/w pilar cyst      Firm pink to brown papule c/w " dermatofibroma      Pedunculated fleshy papule(s) c/w skin tag(s)      Evenly pigmented macule c/w junctional nevus     Mildly variegated pigmented, slightly irregular-bordered macule c/w mildly atypical nevus      Flesh colored to evenly pigmented papule c/w intradermal nevus       Pink pearly papule/plaque c/w basal cell carcinoma      Erythematous hyperkeratotic cursted plaque c/w SCC      Surgical scar with no sign of skin cancer recurrence      Open and closed comedones      Inflammatory papules and pustules      Verrucoid papule consistent consistent with wart     Erythematous eczematous patches and plaques     Dystrophic onycholytic nail with subungual debris c/w onychomycosis     Umbilicated papule    Erythematous-base heme-crusted tan verrucoid plaque consistent with inflamed seborrheic keratosis     Erythematous Silvery Scaling Plaque c/w Psoriasis     See annotation          [] Data reviewed  [] Prior external notes reviewed  [] Independent review of test  [] Management discussed with another provider  [] Independent historian    Assessment / Plan:      Pathology Orders:       Normal Orders This Visit    Specimen to Pathology, Dermatology     Comments:    Number of Specimens:->1  ------------------------->-------------------------  Spec 1 Procedure:->Biopsy  Spec 1 Clinical Impression:->r/o LM vs SK  Spec 1 Source:->right frontal scalp    Questions:    Procedure Type: Dermatology and skin neoplasms    Number of Specimens: 1    ------------------------: -------------------------    Spec 1 Procedure: Biopsy    Spec 1 Clinical Impression: r/o LM vs SK    Spec 1 Source: right frontal scalp    Release to patient:           Neoplasm of uncertain behavior of skin  -     Specimen to Pathology, Dermatology    Shave biopsy procedure note:  Risk, benefits, and alternatives of biopsy are discussed with the patient, including risk of infection, scar, recurrence, and need for additional treatment of site. The patient  agrees to the procedure by verbal consent. The area is marked and prepped with alcohol.  Approximately 1 mL of lidocaine 1% with epinephrine is used for local anesthesia. A sharp blade is used to remove a portion of the lesion. The specimen is sent for pathology. Hemostasis is obtained with aluminum chloride and/or monopolar hyfrecation if needed. The area is then dressed and bandaged. The patient tolerated the procedure well without adverse event. Written instructions on wound care were given and were reviewed with the patient, who is to call for any signs of bleeding or infection. The patient will be notified of the pathology results.      Allergic contact dermatitis, unspecified trigger (vs Herpes zoster without complication)  -     fluocinonide 0.05% (LIDEX) 0.05 % cream; AAA of elbow bid  Dispense: 60 g; Refill: 1  -     valACYclovir (VALTREX) 1000 MG tablet; Take 1 tablet (1,000 mg total) by mouth 3 (three) times daily. for 7 days  Dispense: 21 tablet; Refill: 0    Multiple benign melanocytic nevi  Multiple benign-appearing nevi present on exam today. Reassurance provided. Counseled patient to periodically examine moles and return to clinic if any changes in size, shape, or color are noted or if it becomes symptomatic (bleeding, itching, pain, etc).  Recommend using a broad-spectrum, water-resistant sunscreen with SPF of 30 or higher--reapply every 2 hours. Seek shade, wear sun-protective clothing, and perform regular skin self-exams.    Lentigines  These are benign sun spots which should be monitored for changes. Daily sun protection will reduce the number of new lesions.   Recommend using a broad-spectrum, water-resistant sunscreen with SPF of 30 or higher--reapply every 2 hours. Seek shade, wear sun-protective clothing, and perform regular skin self-exams.    Skin cancer screening  Total body skin examination performed today as noted in physical exam. Suspicious lesion(s) were noted and/or biopsied as  above.  Recommend using a broad-spectrum, water-resistant sunscreen with SPF of 30 or higher--reapply every 2 hours. Seek shade, wear sun-protective clothing, and perform regular skin self-exams.      Follow up in about 1 year (around 4/9/2025) for TBSE, or sooner dependent on pathology results.      Jennifer Gilmore MD, FAAD  [facility]

## 2024-04-16 ENCOUNTER — OFFICE VISIT (OUTPATIENT)
Dept: PRIMARY CARE CLINIC | Facility: CLINIC | Age: 49
End: 2024-04-16
Payer: COMMERCIAL

## 2024-04-16 ENCOUNTER — PATIENT MESSAGE (OUTPATIENT)
Dept: DERMATOLOGY | Facility: CLINIC | Age: 49
End: 2024-04-16
Payer: COMMERCIAL

## 2024-04-16 ENCOUNTER — PATIENT MESSAGE (OUTPATIENT)
Dept: PRIMARY CARE CLINIC | Facility: CLINIC | Age: 49
End: 2024-04-16

## 2024-04-16 VITALS
HEART RATE: 81 BPM | WEIGHT: 269.63 LBS | TEMPERATURE: 98 F | SYSTOLIC BLOOD PRESSURE: 124 MMHG | OXYGEN SATURATION: 98 % | BODY MASS INDEX: 37.75 KG/M2 | RESPIRATION RATE: 18 BRPM | DIASTOLIC BLOOD PRESSURE: 80 MMHG | HEIGHT: 71 IN

## 2024-04-16 DIAGNOSIS — E78.2 MIXED HYPERLIPIDEMIA: ICD-10-CM

## 2024-04-16 DIAGNOSIS — F33.0 DEPRESSION, MAJOR, RECURRENT, MILD: ICD-10-CM

## 2024-04-16 DIAGNOSIS — T14.8XXA BRUISING: ICD-10-CM

## 2024-04-16 DIAGNOSIS — E66.01 SEVERE OBESITY (BMI 35.0-39.9) WITH COMORBIDITY: ICD-10-CM

## 2024-04-16 DIAGNOSIS — G47.33 OSA (OBSTRUCTIVE SLEEP APNEA): ICD-10-CM

## 2024-04-16 DIAGNOSIS — I10 BENIGN ESSENTIAL HYPERTENSION: Primary | ICD-10-CM

## 2024-04-16 PROCEDURE — 3079F DIAST BP 80-89 MM HG: CPT | Mod: CPTII,S$GLB,, | Performed by: INTERNAL MEDICINE

## 2024-04-16 PROCEDURE — 3074F SYST BP LT 130 MM HG: CPT | Mod: CPTII,S$GLB,, | Performed by: INTERNAL MEDICINE

## 2024-04-16 PROCEDURE — 99999 PR PBB SHADOW E&M-EST. PATIENT-LVL IV: CPT | Mod: PBBFAC,,, | Performed by: INTERNAL MEDICINE

## 2024-04-16 PROCEDURE — 3008F BODY MASS INDEX DOCD: CPT | Mod: CPTII,S$GLB,, | Performed by: INTERNAL MEDICINE

## 2024-04-16 PROCEDURE — 1159F MED LIST DOCD IN RCRD: CPT | Mod: CPTII,S$GLB,, | Performed by: INTERNAL MEDICINE

## 2024-04-16 PROCEDURE — 4010F ACE/ARB THERAPY RXD/TAKEN: CPT | Mod: CPTII,S$GLB,, | Performed by: INTERNAL MEDICINE

## 2024-04-16 PROCEDURE — 1160F RVW MEDS BY RX/DR IN RCRD: CPT | Mod: CPTII,S$GLB,, | Performed by: INTERNAL MEDICINE

## 2024-04-16 PROCEDURE — 99214 OFFICE O/P EST MOD 30 MIN: CPT | Mod: S$GLB,,, | Performed by: INTERNAL MEDICINE

## 2024-04-16 NOTE — PROGRESS NOTES
[facility] Note    Chief Complaint      Chief Complaint   Patient presents with    Follow-up     6m       History of Present Illness      Rl Grubbs is a 48 y.o. male who presents today for   Chief Complaint   Patient presents with    Follow-up     6m   .  Patient comes to appointment here for 6m  checkup for chronic issues as below . He is feeling well is complaint with all emds .he admits he could be much better with diet and exercise . He is currently c/o bruising and a rahs to his left elbow    HPI    No problem-specific Assessment & Plan notes found for this encounter.       Problem List Items Addressed This Visit          Psychiatric    Depression, major, recurrent, mild    Overview     Cont welbutrin xl 300 mg is stable on this is off lexapro .               Cardiac/Vascular    Benign essential hypertension - Primary    Overview     Bp well controlled         Mixed hyperlipidemia    Overview     Cont  fenofibrate tolerating well             Endocrine    Severe obesity (BMI 35.0-39.9) with comorbidity    Overview     Cont to motivate for diet and exercise             Orthopedic    Bruising    Overview     Check cbc             Other    TOBIN (obstructive sleep apnea)    Overview     Utilizing cpap with great benefit all night every night .               Past Medical History:  Past Medical History:   Diagnosis Date    Hypertension        Past Surgical History:  Past Surgical History:   Procedure Laterality Date    CHOLECYSTECTOMY  7-28-21    HERNIA REPAIR  7-    ROBOT-ASSISTED CHOLECYSTECTOMY USING DA LORENE XI N/A 07/28/2021    Procedure: XI ROBOTIC CHOLECYSTECTOMY;  Surgeon: Brett Gastelum MD;  Location: [address];  Service: General;  Laterality: N/A;    ROBOT-ASSISTED LAPAROSCOPIC REPAIR OF EPIGASTRIC HERNIA N/A 07/28/2021    Procedure: ROBOTIC REPAIR, HERNIA, EPIGASTRIC;  Surgeon: Brett Gastelum MD;  Location: [address];  Service: General;   Laterality: N/A;  Incarcerated visceral hernia repair         Family History:  family history includes Asthma in his mother; COPD in his mother; Cancer in his father; Diabetes in his brother; Hearing loss in his mother; Heart disease in his brother and mother; Hypertension in his brother and mother; Kidney disease in his mother.     Social History:  Social History     Socioeconomic History    Marital status:    Tobacco Use    Smoking status: Never     Passive exposure: Never    Smokeless tobacco: Never   Substance and Sexual Activity    Alcohol use: Yes     Alcohol/week: 6.0 standard drinks of alcohol     Types: 6 Cans of beer per week     Comment: Socially    Drug use: Never    Sexual activity: Yes     Partners: Female     Birth control/protection: Post-menopausal, None     Social Determinants of Health     Financial Resource Strain: Low Risk  (8/4/2023)    Overall Financial Resource Strain (CARDIA)     Difficulty of Paying Living Expenses: Not hard at all   Food Insecurity: No Food Insecurity (8/4/2023)    Hunger Vital Sign     Worried About Running Out of Food in the Last Year: Never true     Ran Out of Food in the Last Year: Never true   Transportation Needs: No Transportation Needs (8/4/2023)    PRAPARE - Transportation     Lack of Transportation (Medical): No     Lack of Transportation (Non-Medical): No   Physical Activity: Insufficiently Active (8/4/2023)    Exercise Vital Sign     Days of Exercise per Week: 2 days     Minutes of Exercise per Session: 60 min   Stress: No Stress Concern Present (8/4/2023)    [employer] - Occupational Stress Questionnaire     Feeling of Stress : Only a little   Social Connections: Unknown (8/4/2023)    Social Connection and Isolation Panel [NHANES]     Frequency of Communication with Friends and Family: Three times a week     Frequency of Social Gatherings with Friends and Family: Once a week     Active Member of Clubs or Organizations: No      Attends Club or Organization Meetings: Patient declined     Marital Status:    Housing Stability: Low Risk  (8/4/2023)    Housing Stability Vital Sign     Unable to Pay for Housing in the Last Year: No     Number of Places Lived in the Last Year: 1     Unstable Housing in the Last Year: No       Review of Systems:   Review of Systems   Constitutional:  Negative for fever and weight loss.   HENT:  Negative for congestion, hearing loss and sore throat.    Eyes:  Negative for blurred vision.   Respiratory:  Negative for cough and shortness of breath.    Cardiovascular:  Negative for chest pain, palpitations, claudication and leg swelling.   Gastrointestinal:  Negative for abdominal pain, constipation, diarrhea and heartburn.   Genitourinary:  Negative for dysuria.   Musculoskeletal:  Negative for back pain and myalgias.   Skin:  Positive for itching and rash.   Neurological:  Negative for focal weakness and headaches.   Endo/Heme/Allergies:  Bruises/bleeds easily.   Psychiatric/Behavioral:  Negative for depression and suicidal ideas. The patient is not nervous/anxious.         Medications:  Outpatient Encounter Medications as of 4/16/2024   Medication Sig Dispense Refill    buPROPion (WELLBUTRIN XL) 300 MG 24 hr tablet Take 1 tablet (300 mg total) by mouth once daily. 30 tablet 0    cetirizine (ZYRTEC) 10 MG tablet Take 10 mg by mouth once daily.      esomeprazole (NEXIUM) 40 MG capsule TAKE 1 CAPSULE(40 MG) BY MOUTH EVERY DAY 90 capsule 3    fenofibrate (TRICOR) 145 MG tablet Take 1 tablet (145 mg total) by mouth once daily. 90 tablet 3    fluocinonide 0.05% (LIDEX) 0.05 % cream AAA of elbow bid 60 g 1    ibuprofen (ADVIL,MOTRIN) 800 MG tablet Take 1 tablet (800 mg total) by mouth every 6 (six) hours as needed for Pain. 60 tablet 0    irbesartan (AVAPRO) 300 MG tablet TAKE 1 TABLET(300 MG) BY MOUTH EVERY DAY 90 tablet 3    omega 3-dha-epa-fish oil 1,000 mg (120 mg-180 mg) Cap Take 1,000 mg by mouth once  "daily.      valACYclovir (VALTREX) 1000 MG tablet Take 1 tablet (1,000 mg total) by mouth 3 (three) times daily. for 7 days 21 tablet 0    [DISCONTINUED] methylPREDNISolone (MEDROL DOSEPACK) 4 mg tablet use as directed 21 each 0    [DISCONTINUED] promethazine-dextromethorphan (PROMETHAZINE-DM) 6.25-15 mg/5 mL Syrp Take 5 mLs by mouth nightly as needed (cough). 118 mL 0     No facility-administered encounter medications on file as of 4/16/2024.       Allergies:  Review of patient's allergies indicates:  No Known Allergies      Physical Exam      Vitals:    04/16/24 0955   BP: 124/80   Pulse: 81   Resp: 18   Temp: 98 °F (36.7 °C)        Vital Signs  Temp: 98 °F (36.7 °C)  Temp Source: Oral  Pulse: 81  Resp: 18  SpO2: 98 %  BP: 124/80  BP Location: Right arm  Patient Position: Sitting  Pain Score: 0-No pain  Height and Weight  Height: 5' 11" (180.3 cm)  Weight: 122.3 kg (269 lb 10 oz)  BSA (Calculated - sq m): 2.48 sq meters  BMI (Calculated): 37.6  Weight in (lb) to have BMI = 25: 178.9]     Body mass index is 37.6 kg/m².    Physical Exam  Constitutional:       Appearance: He is well-developed.   HENT:      Head: Normocephalic.   Eyes:      Pupils: Pupils are equal, round, and reactive to light.   Neck:      Thyroid: No thyromegaly.   Cardiovascular:      Rate and Rhythm: Normal rate and regular rhythm.      Heart sounds: No murmur heard.     No friction rub. No gallop.   Pulmonary:      Effort: Pulmonary effort is normal.      Breath sounds: Normal breath sounds.   Abdominal:      General: Bowel sounds are normal.      Palpations: Abdomen is soft.   Musculoskeletal:         General: Normal range of motion.      Cervical back: Normal range of motion.   Skin:     General: Skin is warm and dry.      Findings: Rash present.   Neurological:      Mental Status: He is alert and oriented to person, place, and time.      Sensory: No sensory deficit.   Psychiatric:         Behavior: Behavior normal.      " "    Laboratory:  CBC:  No results for input(s): "WBC", "RBC", "HGB", "HCT", "PLT", "MCV", "MCH", "MCHC" in the last 2160 hours.  CMP:  No results for input(s): "GLU", "CALCIUM", "ALBUMIN", "PROT", "NA", "K", "CO2", "CL", "BUN", "ALKPHOS", "ALT", "AST", "BILITOT" in the last 2160 hours.    Invalid input(s): "CREATININ"  URINALYSIS:  No results for input(s): "COLORU", "CLARITYU", "SPECGRAV", "PHUR", "PROTEINUA", "GLUCOSEU", "BILIRUBINCON", "BLOODU", "WBCU", "RBCU", "BACTERIA", "MUCUS", "NITRITE", "LEUKOCYTESUR", "UROBILINOGEN", "HYALINECASTS" in the last 2160 hours.   LIPIDS:  No results for input(s): "TSH", "HDL", "CHOL", "TRIG", "LDLCALC", "CHOLHDL", "NONHDLCHOL", "TOTALCHOLEST" in the last 2160 hours.  TSH:  No results for input(s): "TSH" in the last 2160 hours.  A1C:  No results for input(s): "HGBA1C" in the last 2160 hours.    Radiology:        Assessment:     Rl Grubbs is a 48 y.o.male with:    Rl Edwards" was seen today for follow-up.    Diagnoses and all orders for this visit:    Benign essential hypertension  -     CBC Auto Differential; Future  -     BASIC METABOLIC PANEL; Future    Depression, major, recurrent, mild    Mixed hyperlipidemia    TOBIN (obstructive sleep apnea)    Severe obesity (BMI 35.0-39.9) with comorbidity    Bruising                Plan:     Problem List Items Addressed This Visit          Psychiatric    Depression, major, recurrent, mild    Overview     Cont welbutrin xl 300 mg is stable on this is off lexapro .               Cardiac/Vascular    Benign essential hypertension - Primary    Overview     Bp well controlled         Mixed hyperlipidemia    Overview     Cont  fenofibrate tolerating well             Endocrine    Severe obesity (BMI 35.0-39.9) with comorbidity    Overview     Cont to motivate for diet and exercise             Orthopedic    Bruising    Overview     Check cbc             Other    TOBIN (obstructive sleep apnea)    Overview     Utilizing cpap with great " benefit all night every night .              As above, continue current medications and maintain follow up with specialists.  Return to clinic in 6 months.      Frederick W Dantagnan [facility]

## 2024-04-17 LAB
FINAL PATHOLOGIC DIAGNOSIS: NORMAL
GROSS: NORMAL
Lab: NORMAL
MICROSCOPIC EXAM: NORMAL

## 2024-04-17 NOTE — PROGRESS NOTES
Final Pathologic Diagnosis Skin, right frontal scalp, shave biopsy:  -SEBORRHEIC KERATOSIS, THIN AND PIGMENTED

## 2024-04-19 LAB
BASOPHILS # BLD AUTO: 52 CELLS/UL (ref 0–200)
BASOPHILS NFR BLD AUTO: 0.8 %
BUN SERPL-MCNC: 14 MG/DL (ref 7–25)
BUN/CREAT SERPL: ABNORMAL (CALC) (ref 6–22)
CALCIUM SERPL-MCNC: 9.6 MG/DL (ref 8.6–10.3)
CHLORIDE SERPL-SCNC: 101 MMOL/L (ref 98–110)
CO2 SERPL-SCNC: 28 MMOL/L (ref 20–32)
CREAT SERPL-MCNC: 1.07 MG/DL (ref 0.7–1.3)
EGFR: 85 ML/MIN/1.73M2
EOSINOPHIL # BLD AUTO: 228 CELLS/UL (ref 15–500)
EOSINOPHIL NFR BLD AUTO: 3.5 %
ERYTHROCYTE [DISTWIDTH] IN BLOOD BY AUTOMATED COUNT: 13.8 % (ref 11–15)
GLUCOSE SERPL-MCNC: 123 MG/DL (ref 65–99)
HCT VFR BLD AUTO: 46.9 % (ref 38.5–50)
HGB BLD-MCNC: 15.2 G/DL (ref 13.2–17.1)
LYMPHOCYTES # BLD AUTO: 2334 CELLS/UL (ref 850–3900)
LYMPHOCYTES NFR BLD AUTO: 35.9 %
MCH RBC QN AUTO: 27.4 PG (ref 27–33)
MCHC RBC AUTO-ENTMCNC: 32.4 G/DL (ref 32–36)
MCV RBC AUTO: 84.5 FL (ref 80–100)
MONOCYTES # BLD AUTO: 410 CELLS/UL (ref 200–950)
MONOCYTES NFR BLD AUTO: 6.3 %
NEUTROPHILS # BLD AUTO: 3478 CELLS/UL (ref 1500–7800)
NEUTROPHILS NFR BLD AUTO: 53.5 %
PLATELET # BLD AUTO: 299 THOUSAND/UL (ref 140–400)
PMV BLD REES-ECKER: 9.1 FL (ref 7.5–12.5)
POTASSIUM SERPL-SCNC: 4.6 MMOL/L (ref 3.5–5.3)
RBC # BLD AUTO: 5.55 MILLION/UL (ref 4.2–5.8)
SODIUM SERPL-SCNC: 137 MMOL/L (ref 135–146)
WBC # BLD AUTO: 6.5 THOUSAND/UL (ref 3.8–10.8)

## 2024-04-27 ENCOUNTER — PATIENT MESSAGE (OUTPATIENT)
Dept: ADMINISTRATIVE | Facility: OTHER | Age: 49
End: 2024-04-27
Payer: COMMERCIAL

## 2024-05-01 RX ORDER — ESOMEPRAZOLE MAGNESIUM 40 MG/1
CAPSULE, DELAYED RELEASE ORAL
Qty: 90 CAPSULE | Refills: 3 | Status: SHIPPED | OUTPATIENT
Start: 2024-05-01

## 2024-05-01 NOTE — TELEPHONE ENCOUNTER
Refill Decision Note   Rl Grubbs  is requesting a refill authorization.  Brief Assessment and Rationale for Refill:  Approve     Medication Therapy Plan:         Comments:     Note composed:4:18 PM 05/01/2024

## 2024-05-01 NOTE — TELEPHONE ENCOUNTER
No care due was identified.  [facility] Embedded Care Due Messages. Reference number: [id number].   5/01/2024 5:23:26 AM CDT

## 2024-10-02 ENCOUNTER — PATIENT MESSAGE (OUTPATIENT)
Dept: PRIMARY CARE CLINIC | Facility: CLINIC | Age: 49
End: 2024-10-02
Payer: COMMERCIAL

## 2024-10-03 DIAGNOSIS — F33.0 DEPRESSION, MAJOR, RECURRENT, MILD: ICD-10-CM

## 2024-10-03 RX ORDER — BUPROPION HYDROCHLORIDE 300 MG/1
300 TABLET ORAL
Qty: 90 TABLET | Refills: 3 | Status: SHIPPED | OUTPATIENT
Start: 2024-10-03

## 2024-10-03 NOTE — TELEPHONE ENCOUNTER
Duplicate lUpon chart review, pt contacted the office directly regarding a refill and this request was forwarded to  for review.

## 2024-10-03 NOTE — TELEPHONE ENCOUNTER
Care Due:                  Date            Visit Type   Department     Provider  --------------------------------------------------------------------------------                                EP -                              PRIMARY      [facility] PRIMARY  Last Visit: 04-      CARE ([facility])   STAN Roberto                              EP -                              PRIMARY      [facility] PRIMARY  Next Visit: 10-      CARE ([facility])   STAN Roberto                                                            Last  Test          Frequency    Reason                     Performed    Due Date  --------------------------------------------------------------------------------    CMP.........  12 months..  fenofibrate..............  10-   10-    Lipid Panel.  12 months..  fenofibrate..............  10-   10-    [employer] Embedded Care Due Messages. Reference number: [id number].   10/03/2024 9:24:48 AM CDT

## 2024-10-03 NOTE — TELEPHONE ENCOUNTER
----- Message from Pat sent at 10/3/2024 10:20 AM CDT -----  Contact: JENNA [phone]  Requesting an RX refill or new RX.    Is this a refill or new RX: --New RX-    RX name and strength (copy/paste from chart):    1.buPROPion (WELLBUTRIN XL) 300 MG 24 hr tablet    Is this a 30 day or 90 day RX:     Pharmacy name and phone # (copy/paste from chart):    [id number] - [place] [address] AT [facility] [address] [address]  Phone: [phone] Fax: [phone]      Comment: PT would like to see if the medication can be sent today? I informed of the 72 hour policy, but pt out of the medication. Please call to advise.

## 2024-10-16 ENCOUNTER — OFFICE VISIT (OUTPATIENT)
Dept: PRIMARY CARE CLINIC | Facility: CLINIC | Age: 49
End: 2024-10-16
Payer: COMMERCIAL

## 2024-10-16 ENCOUNTER — PATIENT MESSAGE (OUTPATIENT)
Dept: PRIMARY CARE CLINIC | Facility: CLINIC | Age: 49
End: 2024-10-16

## 2024-10-16 VITALS
SYSTOLIC BLOOD PRESSURE: 118 MMHG | HEART RATE: 84 BPM | HEIGHT: 71 IN | OXYGEN SATURATION: 97 % | WEIGHT: 252 LBS | DIASTOLIC BLOOD PRESSURE: 80 MMHG | RESPIRATION RATE: 18 BRPM | BODY MASS INDEX: 35.28 KG/M2 | TEMPERATURE: 97 F

## 2024-10-16 DIAGNOSIS — G47.33 OSA (OBSTRUCTIVE SLEEP APNEA): ICD-10-CM

## 2024-10-16 DIAGNOSIS — E66.01 SEVERE OBESITY (BMI 35.0-39.9) WITH COMORBIDITY: ICD-10-CM

## 2024-10-16 DIAGNOSIS — Z12.5 PROSTATE CANCER SCREENING: ICD-10-CM

## 2024-10-16 DIAGNOSIS — Z00.00 ANNUAL PHYSICAL EXAM: Primary | ICD-10-CM

## 2024-10-16 PROCEDURE — 1159F MED LIST DOCD IN RCRD: CPT | Mod: CPTII,S$GLB,, | Performed by: INTERNAL MEDICINE

## 2024-10-16 PROCEDURE — 4010F ACE/ARB THERAPY RXD/TAKEN: CPT | Mod: CPTII,S$GLB,, | Performed by: INTERNAL MEDICINE

## 2024-10-16 PROCEDURE — 3074F SYST BP LT 130 MM HG: CPT | Mod: CPTII,S$GLB,, | Performed by: INTERNAL MEDICINE

## 2024-10-16 PROCEDURE — 3008F BODY MASS INDEX DOCD: CPT | Mod: CPTII,S$GLB,, | Performed by: INTERNAL MEDICINE

## 2024-10-16 PROCEDURE — 99396 PREV VISIT EST AGE 40-64: CPT | Mod: S$GLB,,, | Performed by: INTERNAL MEDICINE

## 2024-10-16 PROCEDURE — 1160F RVW MEDS BY RX/DR IN RCRD: CPT | Mod: CPTII,S$GLB,, | Performed by: INTERNAL MEDICINE

## 2024-10-16 PROCEDURE — 99999 PR PBB SHADOW E&M-EST. PATIENT-LVL IV: CPT | Mod: PBBFAC,,, | Performed by: INTERNAL MEDICINE

## 2024-10-16 PROCEDURE — 3079F DIAST BP 80-89 MM HG: CPT | Mod: CPTII,S$GLB,, | Performed by: INTERNAL MEDICINE

## 2024-10-16 NOTE — PROGRESS NOTES
[facility] Note    Chief Complaint      Chief Complaint   Patient presents with    Annual Exam       History of Present Illness      Rl Grubbs is a 48 y.o. male who presents today for   Chief Complaint   Patient presents with    Annual Exam   .  Patient comes to appointment here for annual preventative visit with me . Needs full screening labs . He has been seeing someone at weight loss clinic and has been on semaglutide . He states he had A1c done with them that was 6.7 . He is no longer taking because of tolerability issue. He is trying to get back active with adls , he is trying to mange diet now .     HPI    No problem-specific Assessment & Plan notes found for this encounter.       Problem List Items Addressed This Visit       Prostate cancer screening    Overview     c heck psa          TOBIN (obstructive sleep apnea)    Overview     Utilizing cpap with great benefit all night every night .           Annual physical exam - Primary    Overview     pe documented needs full screening labs          Severe obesity (BMI 35.0-39.9) with comorbidity    Overview     Cont to motivate for diet and exercise see hpi             Past Medical History:  Past Medical History:   Diagnosis Date    Hypertension        Past Surgical History:  Past Surgical History:   Procedure Laterality Date    CHOLECYSTECTOMY  7-28-21    HERNIA REPAIR  7-    ROBOT-ASSISTED CHOLECYSTECTOMY USING DA LORENE XI N/A 07/28/2021    Procedure: XI ROBOTIC CHOLECYSTECTOMY;  Surgeon: Brett Gastelum MD;  Location: [address];  Service: General;  Laterality: N/A;    ROBOT-ASSISTED LAPAROSCOPIC REPAIR OF EPIGASTRIC HERNIA N/A 07/28/2021    Procedure: ROBOTIC REPAIR, HERNIA, EPIGASTRIC;  Surgeon: Brett Gastelum MD;  Location: [facility] OR [address] [place] [address];  Service: General;  Laterality: N/A;  Incarcerated visceral hernia repair         Family History:  family history includes Asthma in his mother; COPD in his mother;  Cancer in his father; Diabetes in his brother; Hearing loss in his mother; Heart disease in his brother and mother; Hypertension in his brother and mother; Kidney disease in his mother.     Social History:  Social History     Socioeconomic History    Marital status:    Tobacco Use    Smoking status: Never     Passive exposure: Never    Smokeless tobacco: Never   Substance and Sexual Activity    Alcohol use: Yes     Alcohol/week: 6.0 standard drinks of alcohol     Types: 6 Cans of beer per week     Comment: Socially    Drug use: Never    Sexual activity: Yes     Partners: Female     Birth control/protection: Post-menopausal, None     Social Drivers of Health     Financial Resource Strain: Low Risk  (8/4/2023)    Overall Financial Resource Strain (CARDIA)     Difficulty of Paying Living Expenses: Not hard at all   Food Insecurity: No Food Insecurity (8/4/2023)    Hunger Vital Sign     Worried About Running Out of Food in the Last Year: Never true     Ran Out of Food in the Last Year: Never true   Transportation Needs: No Transportation Needs (8/4/2023)    PRAPARE - Transportation     Lack of Transportation (Medical): No     Lack of Transportation (Non-Medical): No   Physical Activity: Insufficiently Active (8/4/2023)    Exercise Vital Sign     Days of Exercise per Week: 2 days     Minutes of Exercise per Session: 60 min   Stress: No Stress Concern Present (8/4/2023)    [employer] - Occupational Stress Questionnaire     Feeling of Stress : Only a little   Housing Stability: Low Risk  (8/4/2023)    Housing Stability Vital Sign     Unable to Pay for Housing in the Last Year: No     Number of Places Lived in the Last Year: 1     Unstable Housing in the Last Year: No       Review of Systems:   Review of Systems   Constitutional:  Negative for fever and weight loss.   HENT:  Negative for congestion, hearing loss and sore throat.    Eyes:  Negative for blurred vision.   Respiratory:   Negative for cough and shortness of breath.    Cardiovascular:  Negative for chest pain, palpitations, claudication and leg swelling.   Gastrointestinal:  Negative for abdominal pain, constipation, diarrhea and heartburn.   Genitourinary:  Negative for dysuria.   Musculoskeletal:  Negative for back pain and myalgias.   Skin:  Negative for rash.   Neurological:  Negative for focal weakness and headaches.   Psychiatric/Behavioral:  Negative for depression and suicidal ideas. The patient is not nervous/anxious.         Medications:  Outpatient Encounter Medications as of 10/16/2024   Medication Sig Dispense Refill    buPROPion (WELLBUTRIN XL) 300 MG 24 hr tablet TAKE 1 TABLET(300 MG) BY MOUTH EVERY DAY 90 tablet 3    cetirizine (ZYRTEC) 10 MG tablet Take 10 mg by mouth once daily.      esomeprazole (NEXIUM) 40 MG capsule TAKE 1 CAPSULE(40 MG) BY MOUTH EVERY DAY 90 capsule 3    fenofibrate (TRICOR) 145 MG tablet Take 1 tablet (145 mg total) by mouth once daily. 90 tablet 3    irbesartan (AVAPRO) 300 MG tablet TAKE 1 TABLET(300 MG) BY MOUTH EVERY DAY 90 tablet 3    omega 3-dha-epa-fish oil 1,000 mg (120 mg-180 mg) Cap Take 1,000 mg by mouth once daily.      [DISCONTINUED] buPROPion (WELLBUTRIN XL) 300 MG 24 hr tablet Take 1 tablet (300 mg total) by mouth once daily. 30 tablet 0    [DISCONTINUED] fluocinonide 0.05% (LIDEX) 0.05 % cream AAA of elbow bid 60 g 1    [DISCONTINUED] ibuprofen (ADVIL,MOTRIN) 800 MG tablet Take 1 tablet (800 mg total) by mouth every 6 (six) hours as needed for Pain. 60 tablet 0    [DISCONTINUED] valACYclovir (VALTREX) 1000 MG tablet Take 1 tablet (1,000 mg total) by mouth 3 (three) times daily. for 7 days 21 tablet 0     No facility-administered encounter medications on file as of 10/16/2024.       Allergies:  Review of patient's allergies indicates:  No Known Allergies      Physical Exam      Vitals:    10/16/24 0959   BP: 118/80   Pulse: 84   Resp: 18   Temp: 97 °F (36.1 °C)        Vital  "Signs  Temp: 97 °F (36.1 °C)  Temp Source: Oral  Pulse: 84  Resp: 18  SpO2: 97 %  BP: 118/80  BP Location: Right arm  Patient Position: Sitting  Pain Score: 0-No pain  Height and Weight  Height: 5' 11" (180.3 cm)  Weight: 114.3 kg (251 lb 15.8 oz)  BSA (Calculated - sq m): 2.39 sq meters  BMI (Calculated): 35.2  Weight in (lb) to have BMI = 25: 178.9]     Body mass index is 35.14 kg/m².    Physical Exam  Constitutional:       Appearance: He is well-developed. He is obese.   HENT:      Head: Normocephalic.   Eyes:      Pupils: Pupils are equal, round, and reactive to light.   Neck:      Thyroid: No thyromegaly.   Cardiovascular:      Rate and Rhythm: Normal rate and regular rhythm.      Heart sounds: No murmur heard.     No friction rub. No gallop.   Pulmonary:      Effort: Pulmonary effort is normal.      Breath sounds: Normal breath sounds.   Abdominal:      General: Bowel sounds are normal.      Palpations: Abdomen is soft.   Musculoskeletal:         General: Normal range of motion.      Cervical back: Normal range of motion.   Skin:     General: Skin is warm and dry.   Neurological:      Mental Status: He is alert and oriented to person, place, and time.      Sensory: No sensory deficit.   Psychiatric:         Behavior: Behavior normal.          Laboratory:  CBC:  No results for input(s): "WBC", "RBC", "HGB", "HCT", "PLT", "MCV", "MCH", "MCHC" in the last 2160 hours.  CMP:  No results for input(s): "GLU", "CALCIUM", "ALBUMIN", "PROT", "NA", "K", "CO2", "CL", "BUN", "ALKPHOS", "ALT", "AST", "BILITOT" in the last 2160 hours.    Invalid input(s): "CREATININ"  URINALYSIS:  No results for input(s): "COLORU", "CLARITYU", "SPECGRAV", "PHUR", "PROTEINUA", "GLUCOSEU", "BILIRUBINCON", "BLOODU", "WBCU", "RBCU", "BACTERIA", "MUCUS", "NITRITE", "LEUKOCYTESUR", "UROBILINOGEN", "HYALINECASTS" in the last 2160 hours.   LIPIDS:  No results for input(s): "TSH", "HDL", "CHOL", "TRIG", "LDLCALC", "CHOLHDL", "NONHDLCHOL", " ""TOTALCHOLEST" in the last 2160 hours.  TSH:  No results for input(s): "TSH" in the last 2160 hours.  A1C:  No results for input(s): "HGBA1C" in the last 2160 hours.    Radiology:        Assessment:     Rl Grubbs is a 48 y.o.male with:    Annual physical exam  -     CBC Auto Differential; Future; Expected date: 10/16/2024  -     Comprehensive Metabolic Panel; Future; Expected date: 10/16/2024  -     Lipid Panel; Future; Expected date: 10/16/2024  -     Hemoglobin A1C; Future; Expected date: 10/16/2024    TOBIN (obstructive sleep apnea)    Severe obesity (BMI 35.0-39.9) with comorbidity    Prostate cancer screening  -     PSA, Screening; Future; Expected date: 10/16/2024                Plan:     Problem List Items Addressed This Visit       Prostate cancer screening    Overview     c heck psa          TOBIN (obstructive sleep apnea)    Overview     Utilizing cpap with great benefit all night every night .           Annual physical exam - Primary    Overview     pe documented needs full screening labs          Severe obesity (BMI 35.0-39.9) with comorbidity    Overview     Cont to motivate for diet and exercise see hpi            As above, continue current medications and maintain follow up with specialists.  Return to clinic in 6 months.      Frederick W Dantagnan [facility]                  "

## 2024-10-18 LAB
ALBUMIN SERPL-MCNC: 4.6 G/DL (ref 3.6–5.1)
ALBUMIN/GLOB SERPL: 1.6 (CALC) (ref 1–2.5)
ALP SERPL-CCNC: 52 U/L (ref 35–144)
ALT SERPL-CCNC: 21 U/L (ref 9–46)
AST SERPL-CCNC: 15 U/L (ref 10–35)
BASOPHILS # BLD AUTO: 41 CELLS/UL (ref 0–200)
BASOPHILS NFR BLD AUTO: 0.6 %
BILIRUB SERPL-MCNC: 0.5 MG/DL (ref 0.2–1.2)
BUN SERPL-MCNC: 14 MG/DL (ref 7–25)
BUN/CREAT SERPL: ABNORMAL (CALC) (ref 6–22)
CALCIUM SERPL-MCNC: 9.8 MG/DL (ref 8.6–10.3)
CHLORIDE SERPL-SCNC: 103 MMOL/L (ref 98–110)
CHOLEST SERPL-MCNC: 162 MG/DL
CHOLEST/HDLC SERPL: 3.8 (CALC)
CO2 SERPL-SCNC: 24 MMOL/L (ref 20–32)
CREAT SERPL-MCNC: 1.08 MG/DL (ref 0.7–1.3)
EGFR: 84 ML/MIN/1.73M2
EOSINOPHIL # BLD AUTO: 360 CELLS/UL (ref 15–500)
EOSINOPHIL NFR BLD AUTO: 5.3 %
ERYTHROCYTE [DISTWIDTH] IN BLOOD BY AUTOMATED COUNT: 13.5 % (ref 11–15)
GLOBULIN SER CALC-MCNC: 2.9 G/DL (CALC) (ref 1.9–3.7)
GLUCOSE SERPL-MCNC: 123 MG/DL (ref 65–99)
HBA1C MFR BLD: 6.1 % OF TOTAL HGB
HCT VFR BLD AUTO: 47.2 % (ref 38.5–50)
HDLC SERPL-MCNC: 43 MG/DL
HGB BLD-MCNC: 15 G/DL (ref 13.2–17.1)
LDLC SERPL CALC-MCNC: 96 MG/DL (CALC)
LYMPHOCYTES # BLD AUTO: 1938 CELLS/UL (ref 850–3900)
LYMPHOCYTES NFR BLD AUTO: 28.5 %
MCH RBC QN AUTO: 27.4 PG (ref 27–33)
MCHC RBC AUTO-ENTMCNC: 31.8 G/DL (ref 32–36)
MCV RBC AUTO: 86.1 FL (ref 80–100)
MONOCYTES # BLD AUTO: 360 CELLS/UL (ref 200–950)
MONOCYTES NFR BLD AUTO: 5.3 %
NEUTROPHILS # BLD AUTO: 4100 CELLS/UL (ref 1500–7800)
NEUTROPHILS NFR BLD AUTO: 60.3 %
NONHDLC SERPL-MCNC: 119 MG/DL (CALC)
PLATELET # BLD AUTO: 315 THOUSAND/UL (ref 140–400)
PMV BLD REES-ECKER: 9.3 FL (ref 7.5–12.5)
POTASSIUM SERPL-SCNC: 4.9 MMOL/L (ref 3.5–5.3)
PROT SERPL-MCNC: 7.5 G/DL (ref 6.1–8.1)
PSA SERPL-MCNC: 0.44 NG/ML
RBC # BLD AUTO: 5.48 MILLION/UL (ref 4.2–5.8)
SODIUM SERPL-SCNC: 135 MMOL/L (ref 135–146)
TRIGL SERPL-MCNC: 133 MG/DL
WBC # BLD AUTO: 6.8 THOUSAND/UL (ref 3.8–10.8)

## 2024-11-04 ENCOUNTER — PATIENT MESSAGE (OUTPATIENT)
Dept: ADMINISTRATIVE | Facility: OTHER | Age: 49
End: 2024-11-04
Payer: COMMERCIAL

## 2024-11-05 ENCOUNTER — OFFICE VISIT (OUTPATIENT)
Dept: SURGERY | Facility: CLINIC | Age: 49
End: 2024-11-05
Payer: COMMERCIAL

## 2024-11-05 VITALS
HEIGHT: 71 IN | HEART RATE: 80 BPM | BODY MASS INDEX: 35.42 KG/M2 | WEIGHT: 253 LBS | SYSTOLIC BLOOD PRESSURE: 129 MMHG | OXYGEN SATURATION: 98 % | DIASTOLIC BLOOD PRESSURE: 80 MMHG

## 2024-11-05 DIAGNOSIS — K43.9 VENTRAL HERNIA WITHOUT OBSTRUCTION OR GANGRENE: Primary | ICD-10-CM

## 2024-11-05 PROCEDURE — 1159F MED LIST DOCD IN RCRD: CPT | Mod: CPTII,S$GLB,, | Performed by: SURGERY

## 2024-11-05 PROCEDURE — 4010F ACE/ARB THERAPY RXD/TAKEN: CPT | Mod: CPTII,S$GLB,, | Performed by: SURGERY

## 2024-11-05 PROCEDURE — 3079F DIAST BP 80-89 MM HG: CPT | Mod: CPTII,S$GLB,, | Performed by: SURGERY

## 2024-11-05 PROCEDURE — 3074F SYST BP LT 130 MM HG: CPT | Mod: CPTII,S$GLB,, | Performed by: SURGERY

## 2024-11-05 PROCEDURE — 3008F BODY MASS INDEX DOCD: CPT | Mod: CPTII,S$GLB,, | Performed by: SURGERY

## 2024-11-05 PROCEDURE — 99213 OFFICE O/P EST LOW 20 MIN: CPT | Mod: S$GLB,,, | Performed by: SURGERY

## 2024-11-05 PROCEDURE — 3044F HG A1C LEVEL LT 7.0%: CPT | Mod: CPTII,S$GLB,, | Performed by: SURGERY

## 2024-11-05 PROCEDURE — 99999 PR PBB SHADOW E&M-EST. PATIENT-LVL III: CPT | Mod: PBBFAC,,, | Performed by: SURGERY

## 2024-11-05 NOTE — PROGRESS NOTES
General Surgery Office Visit   History and Physical    Patient Name: Rl Grubbs  YOB: 1975 (48 y.o.)  MRN: [id number]  Today's Date: 11/05/2024    Referring Md:   No referring provider defined for this encounter.    SUBJECTIVE:     Chief Complaint: Abdominal pain    History of Present Illness:  Rl Grubbs is a 48 y.o. male with PMHx of HTN, depression, HLD, GERD, TOBIN who presents to the clinic today complaining of abdominal discomfort and bulge which he first noticed a1-2 years ago and has worsened over time. He denies skin color changes over area. He says the bulge is uncomfortable especially when bending over or doing any physical work. Prior abdominal history includes robotic cholecystectomy and epigastric hernia repair with Phasix ST mesh in 2021 with Dr. Gastelum. He denies fever, chills, nausea, vomiting, any obstructive symptoms.     Patient denies personal history of MI, CVA, lung disease, DM  Social ETOH use. Denies tobacco and elicit drug use.   Not currently on any anticoagulants      Review of patient's allergies indicates:  No Known Allergies    Past Medical History:   Diagnosis Date    Hypertension      Past Surgical History:   Procedure Laterality Date    CHOLECYSTECTOMY  7-28-21    HERNIA REPAIR  7-    ROBOT-ASSISTED CHOLECYSTECTOMY USING DA LORENE XI N/A 07/28/2021    Procedure: XI ROBOTIC CHOLECYSTECTOMY;  Surgeon: Brett Gastelum MD;  Location: [address];  Service: General;  Laterality: N/A;    ROBOT-ASSISTED LAPAROSCOPIC REPAIR OF EPIGASTRIC HERNIA N/A 07/28/2021    Procedure: ROBOTIC REPAIR, HERNIA, EPIGASTRIC;  Surgeon: Brett Gastelum MD;  Location: [address];  Service: General;  Laterality: N/A;  Incarcerated visceral hernia repair       Family History   Problem Relation Name Age of Onset    Asthma Mother Brooke Grubbs     COPD Mother Brooke Grubbs     Hearing loss Mother Brooke Grubbs     Heart disease Mother Brooke Grubbs      "Hypertension Mother Brooke Grubbs     Kidney disease Mother Brooke Grubbs     Cancer Father Denny Grubbs Sr.     Heart disease Brother Austen Grubbs     Hypertension Brother Austen Grubbs     Diabetes Brother Austen Grubbs      Social History     Tobacco Use    Smoking status: Never     Passive exposure: Never    Smokeless tobacco: Never   Substance Use Topics    Alcohol use: Yes     Alcohol/week: 6.0 standard drinks of alcohol     Types: 6 Cans of beer per week     Comment: Socially    Drug use: Never        Review of Systems:  Review of Systems   Constitutional:  Negative for chills and fever.   Respiratory:  Negative for cough and shortness of breath.    Cardiovascular:  Negative for chest pain and palpitations.   Gastrointestinal:  Positive for abdominal pain. Negative for diarrhea, nausea and vomiting.        (+) abdominal bulge    Genitourinary:  Negative for dysuria and hematuria.   Musculoskeletal:  Negative for falls and myalgias.   Skin:  Negative for itching and rash.   Neurological:  Negative for dizziness and headaches.   Endo/Heme/Allergies:  Negative for environmental allergies. Does not bruise/bleed easily.   Psychiatric/Behavioral:  Negative for substance abuse. The patient is not nervous/anxious.    All other systems reviewed and are negative.      OBJECTIVE:     Vital Signs (Most Recent)  /80 (BP Location: Left arm, Patient Position: Sitting)   Pulse 80   Ht 5' 11" (1.803 m)   Wt 114.8 kg (252 lb 15.7 oz)   SpO2 98%   BMI 35.28 kg/m²     Physical Exam  Vitals and nursing note reviewed.   Constitutional:       General: He is not in acute distress.     Appearance: Normal appearance. He is not ill-appearing or diaphoretic.      Comments: Room air   HENT:      Head: Normocephalic and atraumatic.      Mouth/Throat:      Mouth: Mucous membranes are moist.      Pharynx: Oropharynx is clear.   Eyes:      General: No scleral icterus.     Extraocular Movements: Extraocular movements intact. "      Conjunctiva/sclera: Conjunctivae normal.      Pupils: Pupils are equal, round, and reactive to light.   Neck:      Trachea: No tracheal deviation.   Cardiovascular:      Rate and Rhythm: Normal rate and regular rhythm.   Pulmonary:      Effort: Pulmonary effort is normal. No respiratory distress.      Breath sounds: No stridor.   Abdominal:      General: There is no distension.      Palpations: Abdomen is soft.      Tenderness: There is no abdominal tenderness. There is no guarding or rebound.      Hernia: A hernia is present.      Comments: Soft, reducible ventral hernia at midline above umbilicus, measuring 4-6 cm wide. Not TTP. No overlying skin changes. No peritonitic signs   Soft reducible umbilical hernia also present   Musculoskeletal:         General: No deformity or signs of injury.      Cervical back: No edema or erythema.   Skin:     General: Skin is warm and dry.      Coloration: Skin is not jaundiced.   Neurological:      General: No focal deficit present.      Mental Status: He is alert and oriented to person, place, and time.   Psychiatric:         Mood and Affect: Mood normal.         Behavior: Behavior normal.         ASSESSMENT/PLAN:     Rl Grubbs is a 47 y.o. male with PMHx of HTN, depression, HLD, GERD, TOBIN who presents to the clinic today complaining of abdominal discomfort and bulge concerning for ventral hernia. Clinically stable without signs of incarceration/strangulation/obstruction, interested in repair     -CT scan of abdomen and pelvis to evaluate hernia size and extent  - will plan for open vs robotic repair of ventral hernia, pending CT results  - patient's questions and concerns addressed at this time, understands and agrees with plan    Brett Gastelum MD  Acute Care Surgery  [facility] Department of Surgery

## 2024-11-27 RX ORDER — IRBESARTAN 300 MG/1
TABLET ORAL
Qty: 90 TABLET | Refills: 3 | Status: SHIPPED | OUTPATIENT
Start: 2024-11-27

## 2024-11-27 NOTE — TELEPHONE ENCOUNTER
Refill Decision Note   Rl Grubbs  is requesting a refill authorization.  Brief Assessment and Rationale for Refill:  Approve     Medication Therapy Plan:         Comments:     Note composed:9:40 AM 11/27/2024

## 2024-11-27 NOTE — TELEPHONE ENCOUNTER
No care due was identified.  [facility] Embedded Care Due Messages. Reference number: [id number].   11/27/2024 5:22:20 AM CST

## 2024-12-12 ENCOUNTER — PATIENT OUTREACH (OUTPATIENT)
Dept: ADMINISTRATIVE | Facility: HOSPITAL | Age: 49
End: 2024-12-12
Payer: COMMERCIAL

## 2024-12-12 NOTE — PROGRESS NOTES
Health Maintenance Due   Topic Date Due    TETANUS VACCINE  Never done    Shingles Vaccine (1 of 2) Never done    Influenza Vaccine (1) 09/01/2024    COVID-19 Vaccine (5 - 2024-25 season) 09/01/2024     Chart review completed. HM Updated. Triggered Links. Immunizations reviewed and updated. Care Everywhere Updated. Care Team Updated.  Commercial gap list chart review.

## 2025-01-13 ENCOUNTER — HOSPITAL ENCOUNTER (OUTPATIENT)
Dept: RADIOLOGY | Facility: HOSPITAL | Age: 50
Discharge: HOME | End: 2025-01-13
Attending: SURGERY
Payer: COMMERCIAL

## 2025-01-13 DIAGNOSIS — K43.9 VENTRAL HERNIA WITHOUT OBSTRUCTION OR GANGRENE: ICD-10-CM

## 2025-01-13 PROCEDURE — 74176 CT ABD & PELVIS W/O CONTRAST: CPT | Mod: TC

## 2025-01-13 PROCEDURE — 74176 CT ABD & PELVIS W/O CONTRAST: CPT | Mod: 26,,, | Performed by: RADIOLOGY

## 2025-01-13 PROCEDURE — 25500020 PHARM REV CODE 255: Performed by: SURGERY

## 2025-01-13 RX ADMIN — IOHEXOL 15 ML: 350 INJECTION, SOLUTION INTRAVENOUS at 03:01

## 2025-01-14 ENCOUNTER — OFFICE VISIT (OUTPATIENT)
Dept: SURGERY | Facility: CLINIC | Age: 50
End: 2025-01-14
Payer: COMMERCIAL

## 2025-01-14 VITALS
SYSTOLIC BLOOD PRESSURE: 158 MMHG | HEIGHT: 71 IN | OXYGEN SATURATION: 100 % | WEIGHT: 266.31 LBS | DIASTOLIC BLOOD PRESSURE: 96 MMHG | HEART RATE: 81 BPM | BODY MASS INDEX: 37.28 KG/M2

## 2025-01-14 DIAGNOSIS — K43.2 INCISIONAL HERNIA, WITHOUT OBSTRUCTION OR GANGRENE: ICD-10-CM

## 2025-01-14 DIAGNOSIS — N28.89 OTHER SPECIFIED DISORDERS OF KIDNEY AND URETER: Primary | ICD-10-CM

## 2025-01-14 PROCEDURE — 99999 PR PBB SHADOW E&M-EST. PATIENT-LVL III: CPT | Mod: PBBFAC,,, | Performed by: SURGERY

## 2025-01-14 PROCEDURE — 3077F SYST BP >= 140 MM HG: CPT | Mod: CPTII,S$GLB,, | Performed by: SURGERY

## 2025-01-14 PROCEDURE — 99213 OFFICE O/P EST LOW 20 MIN: CPT | Mod: S$GLB,,, | Performed by: SURGERY

## 2025-01-14 PROCEDURE — 3008F BODY MASS INDEX DOCD: CPT | Mod: CPTII,S$GLB,, | Performed by: SURGERY

## 2025-01-14 PROCEDURE — 3080F DIAST BP >= 90 MM HG: CPT | Mod: CPTII,S$GLB,, | Performed by: SURGERY

## 2025-01-14 PROCEDURE — 4010F ACE/ARB THERAPY RXD/TAKEN: CPT | Mod: CPTII,S$GLB,, | Performed by: SURGERY

## 2025-01-15 ENCOUNTER — PATIENT MESSAGE (OUTPATIENT)
Dept: SURGERY | Facility: CLINIC | Age: 50
End: 2025-01-15
Payer: COMMERCIAL

## 2025-01-24 ENCOUNTER — PATIENT MESSAGE (OUTPATIENT)
Dept: SURGERY | Facility: CLINIC | Age: 50
End: 2025-01-24
Payer: COMMERCIAL

## 2025-01-24 ENCOUNTER — HOSPITAL ENCOUNTER (OUTPATIENT)
Dept: RADIOLOGY | Facility: HOSPITAL | Age: 50
Discharge: HOME | End: 2025-01-24
Attending: SURGERY
Payer: COMMERCIAL

## 2025-01-24 DIAGNOSIS — N28.89 OTHER SPECIFIED DISORDERS OF KIDNEY AND URETER: ICD-10-CM

## 2025-01-24 PROCEDURE — 74183 MRI ABD W/O CNTR FLWD CNTR: CPT | Mod: 26,,, | Performed by: RADIOLOGY

## 2025-01-24 PROCEDURE — 25500020 PHARM REV CODE 255: Performed by: SURGERY

## 2025-01-24 PROCEDURE — 74183 MRI ABD W/O CNTR FLWD CNTR: CPT | Mod: TC

## 2025-01-24 PROCEDURE — A9585 GADOBUTROL INJECTION: HCPCS | Performed by: SURGERY

## 2025-01-24 RX ORDER — GADOBUTROL 604.72 MG/ML
10 INJECTION INTRAVENOUS
Status: COMPLETED | OUTPATIENT
Start: 2025-01-24 | End: 2025-01-24

## 2025-01-24 RX ADMIN — GADOBUTROL 10 ML: 604.72 INJECTION INTRAVENOUS at 03:01

## 2025-01-28 DIAGNOSIS — N28.89 RIGHT RENAL MASS: Primary | ICD-10-CM

## 2025-01-31 ENCOUNTER — TELEPHONE (OUTPATIENT)
Dept: PHARMACY | Facility: CLINIC | Age: 50
End: 2025-01-31
Payer: COMMERCIAL

## 2025-01-31 NOTE — TELEPHONE ENCOUNTER
[facility]/Population Health Chart Review & Patient Outreach Details For Medication Adherence Project    Reason for Outreach Encounter: 3rd Party payor non-compliance report ([employer], [employer], C, etc)  2.  Patient Outreach Method: Reviewed patient chart   3.   Medication in question:    Hypertension Medications               irbesartan (AVAPRO) 300 MG tablet TAKE 1 TABLET(300 MG) BY MOUTH EVERY DAY                 irbesartan  last filled  11/4/24 for 90 day supply      4.  Reviewed and or Updates Made To: Patient Chart  5. Outreach Outcomes and/or actions taken: Patient filled medication and is on track to be adherent  Additional Notes:

## 2025-02-05 ENCOUNTER — OFFICE VISIT (OUTPATIENT)
Dept: UROLOGY | Facility: CLINIC | Age: 50
End: 2025-02-05
Payer: COMMERCIAL

## 2025-02-05 ENCOUNTER — HOSPITAL ENCOUNTER (OUTPATIENT)
Dept: RADIOLOGY | Facility: HOSPITAL | Age: 50
Discharge: HOME | End: 2025-02-05
Attending: UROLOGY
Payer: COMMERCIAL

## 2025-02-05 VITALS
HEART RATE: 82 BPM | BODY MASS INDEX: 37.28 KG/M2 | SYSTOLIC BLOOD PRESSURE: 128 MMHG | HEIGHT: 71 IN | WEIGHT: 266.31 LBS | DIASTOLIC BLOOD PRESSURE: 85 MMHG

## 2025-02-05 DIAGNOSIS — K43.9 VENTRAL HERNIA WITHOUT OBSTRUCTION OR GANGRENE: ICD-10-CM

## 2025-02-05 DIAGNOSIS — N28.89 RIGHT RENAL MASS: Primary | ICD-10-CM

## 2025-02-05 DIAGNOSIS — N28.89 RIGHT RENAL MASS: ICD-10-CM

## 2025-02-05 DIAGNOSIS — I10 PRIMARY HYPERTENSION: ICD-10-CM

## 2025-02-05 PROCEDURE — 71046 X-RAY EXAM CHEST 2 VIEWS: CPT | Mod: TC

## 2025-02-05 PROCEDURE — 1159F MED LIST DOCD IN RCRD: CPT | Mod: CPTII,S$GLB,, | Performed by: UROLOGY

## 2025-02-05 PROCEDURE — 3074F SYST BP LT 130 MM HG: CPT | Mod: CPTII,S$GLB,, | Performed by: UROLOGY

## 2025-02-05 PROCEDURE — 3008F BODY MASS INDEX DOCD: CPT | Mod: CPTII,S$GLB,, | Performed by: UROLOGY

## 2025-02-05 PROCEDURE — 3079F DIAST BP 80-89 MM HG: CPT | Mod: CPTII,S$GLB,, | Performed by: UROLOGY

## 2025-02-05 PROCEDURE — 71046 X-RAY EXAM CHEST 2 VIEWS: CPT | Mod: 26,,, | Performed by: RADIOLOGY

## 2025-02-05 PROCEDURE — 1160F RVW MEDS BY RX/DR IN RCRD: CPT | Mod: CPTII,S$GLB,, | Performed by: UROLOGY

## 2025-02-05 PROCEDURE — 99999 PR PBB SHADOW E&M-EST. PATIENT-LVL III: CPT | Mod: PBBFAC,,, | Performed by: UROLOGY

## 2025-02-05 PROCEDURE — 99204 OFFICE O/P NEW MOD 45 MIN: CPT | Mod: S$GLB,,, | Performed by: UROLOGY

## 2025-02-05 NOTE — PROGRESS NOTES
Patient ID: Rl Grubbs is a 49 y.o. male.    Chief Complaint: Renal mass      HPI: Rl Grubbs is a 49 y.o. White male who presents today for evaluation and management of a renal mass.    The mass was found incidentally on imaging done for unrelated reasons.  No gross hematuria. No unexplained weight loss.     The patient denies gross hematuria and/or constitutional symptoms attributable to their recently discovered renal mass.    The patient denies a personal and family history of kidney cancer.      The patient presents today to discuss how best to proceed with their right renal mass.    Review of patient's allergies indicates:  No Known Allergies    Current Outpatient Medications   Medication Sig Dispense Refill    buPROPion (WELLBUTRIN XL) 300 MG 24 hr tablet TAKE 1 TABLET(300 MG) BY MOUTH EVERY DAY 90 tablet 3    cetirizine (ZYRTEC) 10 MG tablet Take 10 mg by mouth once daily.      esomeprazole (NEXIUM) 40 MG capsule TAKE 1 CAPSULE(40 MG) BY MOUTH EVERY DAY 90 capsule 3    fenofibrate (TRICOR) 145 MG tablet Take 1 tablet (145 mg total) by mouth once daily. 90 tablet 3    irbesartan (AVAPRO) 300 MG tablet TAKE 1 TABLET(300 MG) BY MOUTH EVERY DAY 90 tablet 3    omega 3-dha-epa-fish oil 1,000 mg (120 mg-180 mg) Cap Take 1,000 mg by mouth once daily.       No current facility-administered medications for this visit.       Past Medical History:   Diagnosis Date    Hypertension        Past Surgical History:   Procedure Laterality Date    CHOLECYSTECTOMY  7-28-21    HERNIA REPAIR  7-    ROBOT-ASSISTED CHOLECYSTECTOMY USING DA LORENE XI N/A 07/28/2021    Procedure: XI ROBOTIC CHOLECYSTECTOMY;  Surgeon: Brett Gastelum MD;  Location: [address];  Service: General;  Laterality: N/A;    ROBOT-ASSISTED LAPAROSCOPIC REPAIR OF EPIGASTRIC HERNIA N/A 07/28/2021    Procedure: ROBOTIC REPAIR, HERNIA, EPIGASTRIC;  Surgeon: Brett Gastelum MD;  Location: [facility] OR [address] [place] [address];  Service: General;   Patient called back for [facility]. She had covid test Friday and her  had test a day after her. She can be reached at [phone]. Laterality: N/A;  Incarcerated visceral hernia repair         Family History   Problem Relation Name Age of Onset    Asthma Mother Brooke Grubbs     COPD Mother Brooke Grubbs     Hearing loss Mother Brooke Grubbs     Heart disease Mother Brooke Grubbs     Hypertension Mother Brooke Grubbs     Kidney disease Mother Brooke Grubbs     Cancer Father Denny Grubbs Sr.     Heart disease Brother Austen Grubbs     Hypertension Brother Austen Grubbs     Diabetes Brother Austen Grubbs          Review of Systems  All other systems reviewed and negative except pertinent positives noted in HPI.    Objective:     Physical Exam  Constitutional:       General: He is not in acute distress.     Appearance: He is well-developed.   HENT:      Head: Normocephalic and atraumatic.   Eyes:      General: No scleral icterus.  Neck:      Trachea: No tracheal deviation.   Pulmonary:      Effort: Pulmonary effort is normal. No respiratory distress.   Abdominal:      Hernia: A hernia is present. Hernia is present in the umbilical area and ventral area.   Neurological:      Mental Status: He is alert and oriented to person, place, and time.   Psychiatric:         Behavior: Behavior normal.         Thought Content: Thought content normal.         Judgment: Judgment normal.       Assessment:       1. Right renal mass    2. Ventral hernia without obstruction or gangrene    3. Primary hypertension        Plan:     1. Right renal mass    2. Ventral hernia without obstruction or gangrene    3. Primary hypertension        Orders Placed This Encounter   Procedures    X-Ray Chest PA And Lateral     Standing Status:   Future     Standing Expiration Date:   2/5/2026     Order Specific Question:   May the Radiologist modify the order per protocol to meet the clinical needs of the patient?     Answer:   Yes     Order Specific Question:   Release to patient     Answer:   Immediate       - Long talk about renal mass and it's management.   Reviewed images.Discussed options including active surveillance, biopsy, minimally invasive techniques including HFRA, cryo. Discussed open and laparascopic surgical approaches. Discussed partial and radical nephrectomy. Discussed surgery preparation, surgery, recuperation, recovery, exercise restrictions. Discussed risks, benefits, and complications. Answered questions and addressed their concerns.  -I have explained the risk, benefits, and alternatives of the procedure in detail.  The risks including but not limited to bleeding, pseudoaneurysm, AVM, injury to adjacent structures including the spleen, liver, lung, pancreas, colon and intestines, blood vessels in the abdomen including the renal artery or renal vein, ureter, loss of kidney, urinoma or urinary fistula, or need for conversion to open or radical nephrectomy were explained to the patient in depth. The patient voices understanding and all questions have been answered. The patient agrees to proceed as planned with a right robotic partial nephrectomy.  Will plan retroperitoneal approach.  Likely can pursue hernia repair after recovery from kidney surgery.    2. HTN:  -BP reviewed  -stable, continue meds and f/u with PCP    3. Ventral/umbilical hernia  -defer to Dr. Gastelum.  -given likely collecting system entry with kidney surgery, would consider staged procedures and not concomitant repair.

## 2025-02-05 NOTE — ANESTHESIA PAT ROS NOTE
02/05/2025  Rl Grubbs is a 49 y.o., male.      Pre-op Assessment          Review of Systems           Anesthesia Assessment: Preoperative EQUATION    Planned Procedure: Procedure(s) (LRB):  DV5 ROBOTIC NEPHRECTOMY, PARTIAL (Right)  Requested Anesthesia Type:General/Regional  Surgeon: Guzman Christianson MD  Service: Urology  Known or anticipated Date of Surgery:2/27/2025    Surgeon notes: reviewed    Electronic QUestionnaire Assessment completed via nurse interview with patient.        Triage considerations:     Previous anesthesia records:GETA and No problems  7/28/2021 Robotic Cholecystectomy, Epigastric hernia repair  Airway/Jaw/Neck:  Airway Findings: Mouth Opening: Normal Tongue: Normal  General Airway Assessment: Adult  Mallampati: II  TM Distance: Normal, at least 6 cm     Placement Date: 07/28/21 Placement Time: 1112 , created via procedure documentation Method of Intubation: Direct laryngoscopy Mask Ventilation: Easy - oral Intubated: Postinduction Blade: Cedric #3 Airway Device Size: 7.5 Placement Verified By: Capnometry Complicating Factors: None Findings Post-Intubation: Bilateral breath sounds;Atraumatic/Condition of teeth unchanged Secured at: Lips Complications: None     Last PCP note: 3-6 months ago , within [facility]   Subspecialty notes: Urology    Other important co-morbidities: GERD, HLD, HTN, and TOBIN      Tests already available:  No recent tests.     Optimization:  Anesthesia Preop Clinic Assessment  Indicated.    Medical Opinion Indicated.       Plan:    Testing:  CMP, EKG, Hematology Profile, and T&S   Pre-anesthesia  visit       Visit focus: concerns in complex and/or prolonged anesthesia, position other than supine     Consultation:IM Perioperative Hospitalist     Patient  has previously scheduled Medical Appointment: 2/17/2025    Navigation: Tests Scheduled.               Consults scheduled.             Results will be tracked by Preop Clinic.    2/19/2025 Spoke verbally with Dr. Johnson, labs and EKG results reviewed and noted; disregard sending results to PCP per Dr. Johnson.

## 2025-02-17 ENCOUNTER — OFFICE VISIT (OUTPATIENT)
Dept: INTERNAL MEDICINE | Facility: CLINIC | Age: 50
End: 2025-02-17
Payer: COMMERCIAL

## 2025-02-17 ENCOUNTER — ANESTHESIA EVENT (OUTPATIENT)
Dept: SURGERY | Facility: HOSPITAL | Age: 50
End: 2025-02-17
Payer: COMMERCIAL

## 2025-02-17 ENCOUNTER — HOSPITAL ENCOUNTER (OUTPATIENT)
Dept: PREADMISSION TESTING | Facility: HOSPITAL | Age: 50
Discharge: HOME | End: 2025-02-17
Attending: UROLOGY
Payer: COMMERCIAL

## 2025-02-17 ENCOUNTER — RESEARCH ENCOUNTER (OUTPATIENT)
Dept: UROLOGY | Facility: CLINIC | Age: 50
End: 2025-02-17

## 2025-02-17 ENCOUNTER — HOSPITAL ENCOUNTER (OUTPATIENT)
Dept: CARDIOLOGY | Facility: CLINIC | Age: 50
Discharge: HOME | End: 2025-02-17
Payer: COMMERCIAL

## 2025-02-17 ENCOUNTER — OFFICE VISIT (OUTPATIENT)
Dept: UROLOGY | Facility: CLINIC | Age: 50
End: 2025-02-17
Payer: COMMERCIAL

## 2025-02-17 VITALS
SYSTOLIC BLOOD PRESSURE: 128 MMHG | DIASTOLIC BLOOD PRESSURE: 83 MMHG | HEART RATE: 106 BPM | BODY MASS INDEX: 36.79 KG/M2 | HEIGHT: 71 IN | TEMPERATURE: 99 F | OXYGEN SATURATION: 98 % | WEIGHT: 262.81 LBS

## 2025-02-17 DIAGNOSIS — K59.00 CONSTIPATION, UNSPECIFIED CONSTIPATION TYPE: ICD-10-CM

## 2025-02-17 DIAGNOSIS — M47.816 LUMBAR SPONDYLOSIS: ICD-10-CM

## 2025-02-17 DIAGNOSIS — I10 BENIGN ESSENTIAL HYPERTENSION: ICD-10-CM

## 2025-02-17 DIAGNOSIS — R09.82 POST-NASAL DRIP: ICD-10-CM

## 2025-02-17 DIAGNOSIS — Z12.5 PROSTATE CANCER SCREENING: ICD-10-CM

## 2025-02-17 DIAGNOSIS — Z86.16 HISTORY OF COVID-19: ICD-10-CM

## 2025-02-17 DIAGNOSIS — R11.0 NAUSEA: ICD-10-CM

## 2025-02-17 DIAGNOSIS — N28.89 RIGHT RENAL MASS: Primary | ICD-10-CM

## 2025-02-17 DIAGNOSIS — K21.9 GASTROESOPHAGEAL REFLUX DISEASE, UNSPECIFIED WHETHER ESOPHAGITIS PRESENT: ICD-10-CM

## 2025-02-17 DIAGNOSIS — F41.9 ANXIETY: ICD-10-CM

## 2025-02-17 DIAGNOSIS — G47.33 OSA (OBSTRUCTIVE SLEEP APNEA): ICD-10-CM

## 2025-02-17 DIAGNOSIS — E78.2 MIXED HYPERLIPIDEMIA: ICD-10-CM

## 2025-02-17 DIAGNOSIS — R19.8 ABDOMINAL WEAKNESS: ICD-10-CM

## 2025-02-17 DIAGNOSIS — Z01.818 PREOPERATIVE TESTING: ICD-10-CM

## 2025-02-17 DIAGNOSIS — Z01.818 PREOP EXAMINATION: Primary | ICD-10-CM

## 2025-02-17 PROBLEM — K09.8 ORAL CYST: Status: RESOLVED | Noted: 2023-02-01 | Resolved: 2025-02-17

## 2025-02-17 PROBLEM — T14.8XXA BRUISING: Status: RESOLVED | Noted: 2024-04-16 | Resolved: 2025-02-17

## 2025-02-17 PROBLEM — K12.1: Status: RESOLVED | Noted: 2023-01-18 | Resolved: 2025-02-17

## 2025-02-17 PROBLEM — K25.7 CHRONIC GASTRIC ULCER: Status: RESOLVED | Noted: 2021-06-14 | Resolved: 2025-02-17

## 2025-02-17 PROBLEM — Z00.00 ANNUAL PHYSICAL EXAM: Status: RESOLVED | Noted: 2020-09-10 | Resolved: 2025-02-17

## 2025-02-17 LAB
OHS QRS DURATION: 88 MS
OHS QTC CALCULATION: 417 MS

## 2025-02-17 PROCEDURE — 99499 UNLISTED E&M SERVICE: CPT | Mod: S$GLB,,, | Performed by: UROLOGY

## 2025-02-17 NOTE — ASSESSMENT & PLAN NOTE
He is not known to have prostate cancer and he urinates once a night on occasions and to his understanding he does not have enlarged prostate  Feels that he is emptying his bladder well

## 2025-02-17 NOTE — ASSESSMENT & PLAN NOTE
He is on irbesartan      Hypertension-  Blood pressure is acceptable .  I suggest holding -- irbesartan----- on the morning of the surgery and can continue that  post operatively under blood pressure, electrolyte and renal function monitoring as long as they are acceptable.I suggest addressing pain control as uncontrolled pain can increased blood pressure

## 2025-02-17 NOTE — ANESTHESIA PREPROCEDURE EVALUATION
[facility]  Anesthesia Pre-Operative Evaluation         Patient Name: Rl Grubbs  YOB: 1975  MRN: [id number]    SUBJECTIVE:     Pre-operative evaluation for Procedure(s) (LRB):  DV5 ROBOTIC NEPHRECTOMY, PARTIAL (Right)     02/17/2025    Rl Grubbs is a 49 y.o. male w/ a significant PMHx of HTN, GERD, HLD, and TOBIN on CPAP.    Patient with right renal mass.    Patient now presents for the above procedure(s).      LDA:      Prev airway:   Date/Time: 7/28/2021 11:12 AM  Performed by: Dayne Mendez DO  Authorized by: Jonn Qiu MD      Intubation:     Induction:  Intravenous    Intubated:  Postinduction    Mask Ventilation:  Easy with oral airway    Attempts:  1    Attempted By:  Resident anesthesiologist    Method of Intubation:  Direct    Blade:  Cedric 3    Laryngeal View Grade: Grade I - full view of chords      Difficult Airway Encountered?: No      Complications:  None    Airway Device Size:  7.5    Style/Cuff Inflation:  Cuffed    Tube secured:  22    Secured at:  The lips    Placement Verified By:  Capnometry    Complicating Factors:  None    Findings Post-Intubation:  BS equal bilateral and atraumatic/condition of teeth unchanged          Drips: None documented.    Problem List[1]    Review of patient's allergies indicates:  No Known Allergies    Current Outpatient Medications:  Current Medications[2]    Past Surgical History:   Procedure Laterality Date    CHOLECYSTECTOMY  7-28-21    HERNIA REPAIR  7-    ROBOT-ASSISTED CHOLECYSTECTOMY USING DA LORENE XI N/A 07/28/2021    Procedure: XI ROBOTIC CHOLECYSTECTOMY;  Surgeon: Brett Gastelum MD;  Location: [facility] OR [address] [place] [address];  Service: General;  Laterality: N/A;    ROBOT-ASSISTED LAPAROSCOPIC REPAIR OF EPIGASTRIC HERNIA N/A 07/28/2021    Procedure: ROBOTIC REPAIR, HERNIA, EPIGASTRIC;  Surgeon: Brett Gastelum MD;  Location: [facility] OR [address] [place] [address];  Service: General;  Laterality: N/A;  Incarcerated visceral  hernia repair         Social History[3]    OBJECTIVE:     Vital Signs Range (Last 24H):         Significant Labs:  Lab Results   Component Value Date    WBC 5.81 02/17/2025    HGB 14.1 02/17/2025    HCT 42.0 02/17/2025     02/17/2025    CHOL 162 10/17/2024    TRIG 133 10/17/2024    HDL 43 10/17/2024    ALT 17 02/17/2025    AST 16 02/17/2025     (L) 02/17/2025    K 4.0 02/17/2025     02/17/2025    CREATININE 1.2 02/17/2025    BUN 12 02/17/2025    CO2 22 (L) 02/17/2025    HGBA1C 6.1 (H) 10/17/2024       Diagnostic Studies: No relevant studies.    EKG:   Results for orders placed or performed during the hospital encounter of 06/14/21   EKG 12-lead    Collection Time: 06/14/21  3:08 AM    Narrative    Test Reason : R10.13,    Vent. Rate : 066 BPM     Atrial Rate : 066 BPM     P-R Int : 174 ms          QRS Dur : 092 ms      QT Int : 374 ms       P-R-T Axes : 048 044 033 degrees     QTc Int : 392 ms    Sinus rhythm with Premature supraventricular complexes  Otherwise normal ECG  No previous ECGs available  Confirmed by MARILYN DEE MD ([id number]) on 6/14/2021 2:15:30 PM    Referred By: [email or web address]   SELF           Confirmed By:MARILYN DEE MD       2D ECHO:  TTE:  No results found for this or any previous visit.    KELLY:  No results found for this or any previous visit.    ASSESSMENT/PLAN:           Pre-op Assessment    I have reviewed the Patient Summary Reports.     I have reviewed the Nursing Notes.    I have reviewed the Medications.     Review of Systems  Anesthesia Hx:  No problems with previous Anesthesia   History of prior surgery of interest to airway management or planning:  Previous anesthesia: General        Denies Family Hx of Anesthesia complications.   Personal Hx of Anesthesia complications, Post-Operative Nausea/Vomiting (Nausea/dry heaving), with every anesthetic, treatment not known                    Social:  Non-Smoker, Social Alcohol Use       Hematology/Oncology:  Hematology Normal    Oncology Normal                                   EENT/Dental:  EENT/Dental Normal           Cardiovascular:  Exercise tolerance: good   Hypertension       Denies Angina.     hyperlipidemia                               Pulmonary:    Denies COPD.  Denies Asthma.    Sleep Apnea                Renal/:  Renal/ Normal  Denies Chronic Renal Disease.                Hepatic/GI:   PUD,  GERD Denies Liver Disease.               Musculoskeletal:  Musculoskeletal Normal                Neurological:  Neurology Normal   Denies CVA.    Denies Seizures.                                Endocrine:  Endocrine Normal Denies Diabetes.           Psych:  Psychiatric History                  Physical Exam  General: Well nourished, Cooperative, Alert and Oriented    Airway:  Mallampati: II   Mouth Opening: Normal  TM Distance: Normal  Tongue: Normal  Neck ROM: Normal ROM  beard  Dental:  Intact        Anesthesia Plan  Type of Anesthesia, risks & benefits discussed:    Anesthesia Type: Gen ETT  Intra-op Monitoring Plan: Standard ASA Monitors and Art Line  Post Op Pain Control Plan: multimodal analgesia and IV/PO Opioids PRN  Induction:  IV  Airway Plan: Direct and Video, Post-Induction  Informed Consent: Informed consent signed with the Patient and all parties understand the risks and agree with anesthesia plan.  All questions answered.   ASA Score: 2  Day of Surgery Review of History & Physical: H&P Update referred to the surgeon/provider.    Ready For Surgery From Anesthesia Perspective.     .           [1]   Patient Active Problem List  Diagnosis    Benign essential hypertension    Gastroesophageal reflux disease    Mixed hyperlipidemia    Depression, major, recurrent, mild    Prostate cancer screening    TOBIN (obstructive sleep apnea)    Lumbar back pain    Annual physical exam    Encounter for hepatitis C screening test for low risk patient    Encounter for screening for HIV    Chronic gastric ulcer    Pure hypertriglyceridemia     Abdominal weakness    Post-nasal drip    Ulcer of soft palate    Oral cyst    Severe obesity (BMI 35.0-39.9) with comorbidity    Lumbar spondylosis    Bruising   [2]   Current Outpatient Medications:     buPROPion (WELLBUTRIN XL) 300 MG 24 hr tablet, TAKE 1 TABLET(300 MG) BY MOUTH EVERY DAY, Disp: 90 tablet, Rfl: 3    cetirizine (ZYRTEC) 10 MG tablet, Take 10 mg by mouth once daily., Disp: , Rfl:     esomeprazole (NEXIUM) 40 MG capsule, TAKE 1 CAPSULE(40 MG) BY MOUTH EVERY DAY, Disp: 90 capsule, Rfl: 3    fenofibrate (TRICOR) 145 MG tablet, Take 1 tablet (145 mg total) by mouth once daily., Disp: 90 tablet, Rfl: 3    irbesartan (AVAPRO) 300 MG tablet, TAKE 1 TABLET(300 MG) BY MOUTH EVERY DAY, Disp: 90 tablet, Rfl: 3    omega 3-dha-epa-fish oil 1,000 mg (120 mg-180 mg) Cap, Take 1,000 mg by mouth once daily., Disp: , Rfl:   [3]   Social History  Socioeconomic History    Marital status:    Tobacco Use    Smoking status: Never     Passive exposure: Never    Smokeless tobacco: Never   Substance and Sexual Activity    Alcohol use: Yes     Alcohol/week: 6.0 standard drinks of alcohol     Types: 6 Cans of beer per week     Comment: Socially    Drug use: Never    Sexual activity: Yes     Partners: Female     Birth control/protection: Post-menopausal, None     Social Drivers of Health     Financial Resource Strain: Low Risk  (11/4/2024)    Overall Financial Resource Strain (CARDIA)     Difficulty of Paying Living Expenses: Not hard at all   Food Insecurity: No Food Insecurity (11/4/2024)    Hunger Vital Sign     Worried About Running Out of Food in the Last Year: Never true     Ran Out of Food in the Last Year: Never true   Transportation Needs: No Transportation Needs (8/4/2023)    PRAPARE - Transportation     Lack of Transportation (Medical): No     Lack of Transportation (Non-Medical): No   Physical Activity: Insufficiently Active (11/4/2024)    Exercise Vital Sign     Days of Exercise per Week: 2 days     Minutes  of Exercise per Session: 30 min   Stress: No Stress Concern Present (11/4/2024)    [employer] - Occupational Stress Questionnaire     Feeling of Stress : Not at all   Housing Stability: Low Risk  (8/4/2023)    Housing Stability Vital Sign     Unable to Pay for Housing in the Last Year: No     Number of Places Lived in the Last Year: 1     Unstable Housing in the Last Year: No

## 2025-02-17 NOTE — ASSESSMENT & PLAN NOTE
Small fat containing umbilical hernia.     Bilateral fat containing inguinal hernias.    He is currently doing well from this and he plans on addressing these with a mesh at a later time    He, currently does not have any suggestions of bowel obstruction

## 2025-02-17 NOTE — PROGRESS NOTES
Urology ([facility]) H&P for upcoming procedure  Staff:  Guzman Christianson MD    CC: right renal mass    HPI:  Rl Grubbs is a 49 y.o. male with a right renal mass.     MRI 1/24/25 shows 3 cm right upper pole renal mass.    CTAP from 1/13/25 shows 3 mm pulmonary micronodule and large ventral hernia.    CXR 2/5/25 was normal.      Prior abdominal history includes robotic cholecystectomy and epigastric hernia repair with Phasix ST mesh in 2021 with Dr. Gastelum.     Denies recent dysuria, hematuria, fevers, chills, nausea, vomiting, chest pain, or shortness of breath.    ROS: Negative except for as stated above    Past Medical History:   Diagnosis Date    Hypertension        Past Surgical History:   Procedure Laterality Date    CHOLECYSTECTOMY  7-28-21    HERNIA REPAIR  7-    ROBOT-ASSISTED CHOLECYSTECTOMY USING DA LORENE XI N/A 07/28/2021    Procedure: XI ROBOTIC CHOLECYSTECTOMY;  Surgeon: Brett Gastelum MD;  Location: [facility] OR [address] [place] [address];  Service: General;  Laterality: N/A;    ROBOT-ASSISTED LAPAROSCOPIC REPAIR OF EPIGASTRIC HERNIA N/A 07/28/2021    Procedure: ROBOTIC REPAIR, HERNIA, EPIGASTRIC;  Surgeon: Brett Gastelum MD;  [facility];  Service: General;  Laterality: N/A;  Incarcerated visceral hernia repair         Social History     Socioeconomic History    Marital status:    Tobacco Use    Smoking status: Never     Passive exposure: Never    Smokeless tobacco: Never   Substance and Sexual Activity    Alcohol use: Yes     Alcohol/week: 6.0 standard drinks of alcohol     Types: 6 Cans of beer per week     Comment: Socially    Drug use: Never    Sexual activity: Yes     Partners: Female     Birth control/protection: Post-menopausal, None     Social Drivers of Health     Financial Resource Strain: Low Risk  (11/4/2024)    Overall Financial Resource Strain (CARDIA)     Difficulty of Paying Living Expenses: Not hard at all   Food Insecurity: No Food Insecurity (11/4/2024)    Hunger  "Vital Sign     Worried About Running Out of Food in the Last Year: Never true     Ran Out of Food in the Last Year: Never true   Transportation Needs: No Transportation Needs (8/4/2023)    PRAPARE - Transportation     Lack of Transportation (Medical): No     Lack of Transportation (Non-Medical): No   Physical Activity: Insufficiently Active (11/4/2024)    Exercise Vital Sign     Days of Exercise per Week: 2 days     Minutes of Exercise per Session: 30 min   Stress: No Stress Concern Present (11/4/2024)    [employer] - Occupational Stress Questionnaire     Feeling of Stress : Not at all   Housing Stability: Low Risk  (8/4/2023)    Housing Stability Vital Sign     Unable to Pay for Housing in the Last Year: No     Number of Places Lived in the Last Year: 1     Unstable Housing in the Last Year: No       Family History   Problem Relation Name Age of Onset    Asthma Mother Brooke Grubbs     COPD Mother Brooke Grubbs     Hearing loss Mother Brooke Grubbs     Heart disease Mother Brooke Grubbs     Hypertension Mother Brooke Grubbs     Kidney disease Mother Brooke Grubbs     Cancer Father Denny Grubbs Sr.     Heart disease Brother Austen Grubbs     Hypertension Brother Austen Grubbs     Diabetes Brother Austen Grubbs        Review of patient's allergies indicates:  No Known Allergies    Medications Ordered Prior to Encounter[1]    Anticoagulation:  None    Physical Exam:  Estimated body mass index is 37.14 kg/m² as calculated from the following:    Height as of 2/5/25: 5' 11" (1.803 m).    Weight as of 2/5/25: 120.8 kg (266 lb 5.1 oz).     General: No acute distress, well developed. AAOx3  Head: Normocephalic, Atraumatic  Eyes: Extra-occular movements intact, No discharge  Neck: supple, symmetrical, trachea midline  Lungs: normal respiratory effort, no respiratory distress, no wheezes  CV: regular rate, 2+ pulses  Abdomen: Obese, soft, non-tender, non-distended, no " organomegaly. Supraumbilical hernia evident.  MSK: no edema, no deformities, normal ROM  Skin: skin color, texture, turgor normal.  Neurologic: no focal deficits, sensation intact     Labs:    Lab Results   Component Value Date    WBC 6.8 10/17/2024    HGB 15.0 10/17/2024    HCT 47.2 10/17/2024    MCV 86.1 10/17/2024     10/17/2024       BMP  Lab Results   Component Value Date     10/17/2024    K 4.9 10/17/2024     10/17/2024    CO2 24 10/17/2024    BUN 14 10/17/2024    CREATININE 1.08 10/17/2024    CALCIUM 9.8 10/17/2024    ANIONGAP 17 09/24/2021    EGFRNORACEVR 84 10/17/2024       Assessment: Rl Grubbs is a 49 y.o. male with right renal mass, suspicious for cT1a RCC    Plan:     1. To OR on 2/27/25 for right robotic partial nephrectomy, retroperitoneal approach.   2.  Pre-op appointment on 2/17 with Dr. Whyte- optimized for surgery.    3. Type and screen ordered preoperatively. The risks, benefits, and indications of a blood transfusion were discussed. The patient was given a chance to ask questions and all questions answered to his satisfaction. Consent obtained.   4. The risks and benefits of participating in our clinical trial have been discussed and the patient has consented for the research study here at [facility].     Yue Antonio DO  PGY-1 Urology         [1]   Current Outpatient Medications on File Prior to Visit   Medication Sig Dispense Refill    buPROPion (WELLBUTRIN XL) 300 MG 24 hr tablet TAKE 1 TABLET(300 MG) BY MOUTH EVERY DAY 90 tablet 3    cetirizine (ZYRTEC) 10 MG tablet Take 10 mg by mouth once daily.      esomeprazole (NEXIUM) 40 MG capsule TAKE 1 CAPSULE(40 MG) BY MOUTH EVERY DAY 90 capsule 3    fenofibrate (TRICOR) 145 MG tablet Take 1 tablet (145 mg total) by mouth once daily. 90 tablet 3    irbesartan (AVAPRO) 300 MG tablet TAKE 1 TABLET(300 MG) BY MOUTH EVERY DAY 90 tablet 3    omega 3-dha-epa-fish oil 1,000 mg (120 mg-180 mg) Cap Take 1,000 mg by mouth once daily.        No current facility-administered medications on file prior to visit.

## 2025-02-17 NOTE — ASSESSMENT & PLAN NOTE
He has seasonal problems with this and he has skin hypersensitivity for which he uses Zyrtec and had dermatological evaluation    He did not have any problem with the tape that was used for his previous hernia surgery

## 2025-02-17 NOTE — ASSESSMENT & PLAN NOTE
Sleep apnea   Uses CPAP.  Suggested bringing for hospital use .   Informed the risk of worsening sleep apnea in the perioperative period and suggest using CPAP use any time in 24 hrs ( day or night )for planned sleep     I suggest  caution with usage of medication that can cause respiratory suppression in the perioperative period    Avoidance of  supine sleep, weight gain , alcoholic beverages , care with , sedative , CNS depressant use indicated  since all of these can worsen TOBIN      I suggested care with using pain medication that can be sedating that can increase sleep apnea    Suggested not to drive if he feels sleepy

## 2025-02-17 NOTE — ASSESSMENT & PLAN NOTE
He had nausea, dry heaving after his hernia surgery in 2021    - I suggest that the perioperative team be aware of this so that appropriate preventive care can be taken

## 2025-02-17 NOTE — ASSESSMENT & PLAN NOTE
Attributes to diet    Suggested working on bowel movements in preparation for surgery   Constipation- I suggest giving bowel movement regimen as opioid use,reduced ambulation  can increase the constipation

## 2025-02-17 NOTE — OUTPATIENT SUBJECTIVE & OBJECTIVE
"Outpatient Subjective & Objective     Chief complaint-Preoperative evaluation, Perioperative Medical management, complication reduction plan     Active cardiac conditions- none    Revised cardiac risk index predictors- none    Functional capacity -Examples of physical activity  can take 1 flight of stairs and cutting the grass with a mower----- He can undertake all the above activities without  chest pain,chest tightness, Shortness of breath ,dizziness,lightheadedness making his exercise tolerance more,than 4 Mets.       Review of Systems   Constitutional:  Negative for chills and fever.        No unusual weight changes     HENT:           sleep apnea   Eyes:         No sudden vision changes   Respiratory:          No cough , phlegm    No Hemoptysis   Cardiovascular:         As noted   Gastrointestinal:         Bowels- Regular   No overt GI/ blood losses   Endocrine:        Prednisone use > 20 mg daily for 3 weeks- none   Genitourinary:  Negative for dysuria.        No urinary hesitancy    Musculoskeletal:         As above       Skin:  Negative for rash.   Neurological:  Negative for syncope.        No unilateral weakness   Hematological:         Current use of Anticoagulants  None             No bleeding, cardiac problems with previous surgeries/procedures.  Medications and Allergies reviewed in epic.     FH- No anesthesia,bleeding / venous thrombosis   in family      Physical Exam  Blood pressure 128/83, pulse 106, temperature 98.7 °F (37.1 °C), height 5' 11" (1.803 m), weight 119.2 kg (262 lb 12.6 oz), SpO2 98%.      Physical Exam  Constitutional- Vitals - Body mass index is 36.65 kg/m².,   Vitals:    02/17/25 1315   BP: 128/83   Pulse: 106   Temp: 98.7 °F (37.1 °C)     General appearance-Conscious,Coherent  Eyes- No conjunctival icterus,pupils  round  and reactive to light   ENT-Oral cavity- moist    , Hearing grossly normal   Neck- No thyromegaly ,Trachea -central, No jugular venous distension,   No Carotid " Bruit   Cardiovascular -Heart Sounds- Normal  and  no murmur   , No gallop rhythm   Respiratory - Normal Respiratory Effort, Normal breath sounds,  no wheeze , and  no forced expiratory wheeze    Peripheral pitting pedal edema-- none , no calf pain   Gastrointestinal -Soft abdomen, No palpable masses, Non Tender,Liver,Spleen not palpable. No-- free fluid and shifting dullness  Musculoskeletal- No finger Clubbing. Strength grossly normal   Lymphatic-No Palpable cervical, axillary,Inguinal lymphadenopathy   Psychiatric - normal effect,Orientation  Rt Dorsalis pedis pulses-palpable    Lt Dorsalis pedis pulses- palpable   Rt Posterior tibial pulses -palpable   Left posterior tibial pulses -palpable   Miscellaneous -  no renal bruit     Investigations  Lab and Imaging have been reviewed in [facility].  His nonfasting labs showed hyperglycemia and mildly low sodium    Review of old records- Was done and information gathered regards to events leading to surgery and health conditions of significance in the perioperative period.    Review of Medicine tests    EKG- I had independently reviewed the EKG from--today   No acute change    Review of clinical lab tests:  Lab Results   Component Value Date    CREATININE 1.2 02/17/2025    HGB 14.1 02/17/2025     02/17/2025         Review of old records- Was done and information gathered regards to events leading to surgery and health conditions of significance in the perioperative period       Outpatient Subjective & Objective
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
## 2025-02-17 NOTE — HPI
History of present illness- I had the pleasure of meeting this pleasant 49 y.o. gentleman in the pre op clinic prior to his elective Urological surgery. The patient is new to me .   Offered to have family to be on the phone during the consultation    I have obtained the history by speaking to the patient and by reviewing the electronic health records.    Events leading up to surgery / History of presenting illness -    Right renal mass       I have obtained the information by speaking to him who is very knowledgeable about his health  He had evaluation for a surgery on his abdominal wall hernia-2 sites  He previously had abdominal wall hernia repair done a few years ago    During imaging for the hernia he was found to have a right renal abnormality and he had further MRI scans that showed a renal mass on the right side for which he is having surgery done on the 27th of February    His kidney problem does not cause him any pain    No blood in the urine    He has no smoking history      Relevant health conditions of significance for the perioperative period/ History of presenting illness -    HTN  HLD  GERD  TOBIN  Ventral  hernia  Prediabetes    Lives with wife and their grown son who is currently 22 lives with  them  Their son is studying part-time and can help him after surgery  Single-level house  Works as a  for a construction company and involves working at a [employer]  Pets- 1 cat  Children - 1 son  Has help post op    Not known to have  heart problem , Stroke/ Mini stroke , Diabetes Mellitus, Lung problem, Thyroid problem, Kidney function problem, deep vein thrombosis, pulmonary embolism,  fatty liver , blood vessels stent , tobacco smoking, edema, mental health problems , gout

## 2025-02-17 NOTE — H&P (VIEW-ONLY)
Urology ([facility]) H&P for upcoming procedure  Staff:  Guzman Christianson MD    CC: right renal mass    HPI:  Rl Grubbs is a 49 y.o. male with a right renal mass.     MRI 1/24/25 shows 3 cm right upper pole renal mass.    CTAP from 1/13/25 shows 3 mm pulmonary micronodule and large ventral hernia.    CXR 2/5/25 was normal.      Prior abdominal history includes robotic cholecystectomy and epigastric hernia repair with Phasix ST mesh in 2021 with Dr. Gastelum.     Denies recent dysuria, hematuria, fevers, chills, nausea, vomiting, chest pain, or shortness of breath.    ROS: Negative except for as stated above    Past Medical History:   Diagnosis Date    Hypertension        Past Surgical History:   Procedure Laterality Date    CHOLECYSTECTOMY  7-28-21    HERNIA REPAIR  7-    ROBOT-ASSISTED CHOLECYSTECTOMY USING DA LORENE XI N/A 07/28/2021    Procedure: XI ROBOTIC CHOLECYSTECTOMY;  Surgeon: Brett Gastelum MD;  Location: [facility] OR [address] [place] [address];  Service: General;  Laterality: N/A;    ROBOT-ASSISTED LAPAROSCOPIC REPAIR OF EPIGASTRIC HERNIA N/A 07/28/2021    Procedure: ROBOTIC REPAIR, HERNIA, EPIGASTRIC;  Surgeon: Brett Gastelum MD;  [facility];  Service: General;  Laterality: N/A;  Incarcerated visceral hernia repair         Social History     Socioeconomic History    Marital status:    Tobacco Use    Smoking status: Never     Passive exposure: Never    Smokeless tobacco: Never   Substance and Sexual Activity    Alcohol use: Yes     Alcohol/week: 6.0 standard drinks of alcohol     Types: 6 Cans of beer per week     Comment: Socially    Drug use: Never    Sexual activity: Yes     Partners: Female     Birth control/protection: Post-menopausal, None     Social Drivers of Health     Financial Resource Strain: Low Risk  (11/4/2024)    Overall Financial Resource Strain (CARDIA)     Difficulty of Paying Living Expenses: Not hard at all   Food Insecurity: No Food Insecurity (11/4/2024)    Hunger  "Vital Sign     Worried About Running Out of Food in the Last Year: Never true     Ran Out of Food in the Last Year: Never true   Transportation Needs: No Transportation Needs (8/4/2023)    PRAPARE - Transportation     Lack of Transportation (Medical): No     Lack of Transportation (Non-Medical): No   Physical Activity: Insufficiently Active (11/4/2024)    Exercise Vital Sign     Days of Exercise per Week: 2 days     Minutes of Exercise per Session: 30 min   Stress: No Stress Concern Present (11/4/2024)    [employer] - Occupational Stress Questionnaire     Feeling of Stress : Not at all   Housing Stability: Low Risk  (8/4/2023)    Housing Stability Vital Sign     Unable to Pay for Housing in the Last Year: No     Number of Places Lived in the Last Year: 1     Unstable Housing in the Last Year: No       Family History   Problem Relation Name Age of Onset    Asthma Mother Brooke Grubbs     COPD Mother Brooke Grubbs     Hearing loss Mother Brooke Grubbs     Heart disease Mother Brooke Grubbs     Hypertension Mother Brooke Grubbs     Kidney disease Mother Brooke Grubbs     Cancer Father Denny Grubbs Sr.     Heart disease Brother Austen Grubbs     Hypertension Brother Austen Grubbs     Diabetes Brother Austen Grubbs        Review of patient's allergies indicates:  No Known Allergies    Medications Ordered Prior to Encounter[1]    Anticoagulation:  None    Physical Exam:  Estimated body mass index is 37.14 kg/m² as calculated from the following:    Height as of 2/5/25: 5' 11" (1.803 m).    Weight as of 2/5/25: 120.8 kg (266 lb 5.1 oz).     General: No acute distress, well developed. AAOx3  Head: Normocephalic, Atraumatic  Eyes: Extra-occular movements intact, No discharge  Neck: supple, symmetrical, trachea midline  Lungs: normal respiratory effort, no respiratory distress, no wheezes  CV: regular rate, 2+ pulses  Abdomen: Obese, soft, non-tender, non-distended, no " organomegaly. Supraumbilical hernia evident.  MSK: no edema, no deformities, normal ROM  Skin: skin color, texture, turgor normal.  Neurologic: no focal deficits, sensation intact     Labs:    Lab Results   Component Value Date    WBC 6.8 10/17/2024    HGB 15.0 10/17/2024    HCT 47.2 10/17/2024    MCV 86.1 10/17/2024     10/17/2024       BMP  Lab Results   Component Value Date     10/17/2024    K 4.9 10/17/2024     10/17/2024    CO2 24 10/17/2024    BUN 14 10/17/2024    CREATININE 1.08 10/17/2024    CALCIUM 9.8 10/17/2024    ANIONGAP 17 09/24/2021    EGFRNORACEVR 84 10/17/2024       Assessment: Rl Grubbs is a 49 y.o. male with right renal mass, suspicious for cT1a RCC    Plan:     1. To OR on 2/27/25 for right robotic partial nephrectomy, retroperitoneal approach.   2.  Pre-op appointment on 2/17 with Dr. Whyte- optimized for surgery.    3. Type and screen ordered preoperatively. The risks, benefits, and indications of a blood transfusion were discussed. The patient was given a chance to ask questions and all questions answered to his satisfaction. Consent obtained.   4. The risks and benefits of participating in our clinical trial have been discussed and the patient has consented for the research study here at [facility].     Yue Antonio DO  PGY-1 Urology         [1]   Current Outpatient Medications on File Prior to Visit   Medication Sig Dispense Refill    buPROPion (WELLBUTRIN XL) 300 MG 24 hr tablet TAKE 1 TABLET(300 MG) BY MOUTH EVERY DAY 90 tablet 3    cetirizine (ZYRTEC) 10 MG tablet Take 10 mg by mouth once daily.      esomeprazole (NEXIUM) 40 MG capsule TAKE 1 CAPSULE(40 MG) BY MOUTH EVERY DAY 90 capsule 3    fenofibrate (TRICOR) 145 MG tablet Take 1 tablet (145 mg total) by mouth once daily. 90 tablet 3    irbesartan (AVAPRO) 300 MG tablet TAKE 1 TABLET(300 MG) BY MOUTH EVERY DAY 90 tablet 3    omega 3-dha-epa-fish oil 1,000 mg (120 mg-180 mg) Cap Take 1,000 mg by mouth once daily.        No current facility-administered medications on file prior to visit.      contreras all pertinent systems normal

## 2025-02-17 NOTE — ASSESSMENT & PLAN NOTE
He is on fenofibrate for hyperlipidemia and I suggested for him to skip the fenofibrate the day before surgery and the day of surgery
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
## 2025-02-17 NOTE — ASSESSMENT & PLAN NOTE
GERD Nexium      Does not sound Cardiac   Tips to control reflux discussed     GERD-  I suggest continuation of the Proton pump inhibitor in the perioperative period . I suggest aspiration precautions    I suggest watching fatty and spicy food  Citrus and tomato based  Alcohol, caffeine, soda  Chocolate, peppermint spearmint  Not to lay down within 3 hours after eating  Not to snack before going to bed  Sleep elevated  Watching weight  Watching anti-inflammatory medication    He previously had gastric ulcer long time ago about 15 years ago that he attributes to stress and he no longer has a problem with the ulcer

## 2025-02-17 NOTE — ASSESSMENT & PLAN NOTE
He is on Wellbutrin for his mental health    Doing good from a mental health stand point   Not under psychiatry , Therapist care   Has supportive family   On Medication that is helping   No Suicidal / Homicidal ideation      I suggest monitoring the sodium as SIADH from Wellbutrin  use and hypersecretion of ADH associated with surgery can reduce sodium in the perioperative period

## 2025-02-17 NOTE — ASSESSMENT & PLAN NOTE
Weight related conditions     Known to have     HTN   Prediabetes   Hyperlipidemia   Sleep apnea   Acid reflux   Osteoarthritis    Not troubled with / Not known to have     Type 2 Diabetes   Gout   Fatty liver       Encouraged maintaining healthy weight for improved health

## 2025-02-18 NOTE — PROGRESS NOTES
"Participant was consented  for Dr. Tiffanie Moreno"s Renal Stem Cell Study, IRB 2011.222  and the informed consent process was conducted by Yue Antonio DO. Please see scanned documents in Media tab reflecting the consenting process.      "

## 2025-02-19 DIAGNOSIS — E78.1 PURE HYPERTRIGLYCERIDEMIA: ICD-10-CM

## 2025-02-19 DIAGNOSIS — E78.2 MIXED HYPERLIPIDEMIA: ICD-10-CM

## 2025-02-19 NOTE — TELEPHONE ENCOUNTER
No care due was identified.  [facility] Embedded Care Due Messages. Reference number: [id number].   2/19/2025 5:01:21 PM CST

## 2025-02-20 RX ORDER — FENOFIBRATE 145 MG/1
145 TABLET, FILM COATED ORAL DAILY
Qty: 90 TABLET | Refills: 3 | Status: SHIPPED | OUTPATIENT
Start: 2025-02-20 | End: 2026-02-20

## 2025-02-21 NOTE — PROGRESS NOTES
General Surgery Office Visit   History and Physical    Patient Name: Rl Grubbs  YOB: 1975 (49 y.o.)  MRN: [id number]  Today's Date: 02/21/2025    Referring Md:   No referring provider defined for this encounter.    SUBJECTIVE:     Chief Complaint: Abdominal pain    History of Present Illness:  Rl Grubbs is a 49 y.o. male with PMHx of HTN, depression, HLD, GERD, TOBIN who presents to the clinic today complaining of abdominal discomfort and bulge which he first noticed a1-2 years ago and has worsened over time. He denies skin color changes over area. He says the bulge is uncomfortable especially when bending over or doing any physical work. Prior abdominal history includes robotic cholecystectomy and epigastric hernia repair with Phasix ST mesh in 2021 with Dr. Gastelum. He denies fever, chills, nausea, vomiting, any obstructive symptoms.  He returns to clinic today following CT scan which redemonstrated in incisional hernia and is here to discuss surgical options.  Imaging also showed an indeterminate renal mass.    Patient denies personal history of MI, CVA, lung disease, DM  Social ETOH use. Denies tobacco and elicit drug use.   Not currently on any anticoagulants      Review of patient's allergies indicates:  No Known Allergies    Past Medical History:   Diagnosis Date    Anxiety     GERD (gastroesophageal reflux disease)     Hyperlipidemia     Hypertension      Past Surgical History:   Procedure Laterality Date    CHOLECYSTECTOMY  7-28-21    HERNIA REPAIR  7-    ROBOT-ASSISTED CHOLECYSTECTOMY USING DA LORENE XI N/A 07/28/2021    Procedure: XI ROBOTIC CHOLECYSTECTOMY;  Surgeon: Brett Gastelum MD;  Location: [facility] OR [address] [place] [address];  Service: General;  Laterality: N/A;    ROBOT-ASSISTED LAPAROSCOPIC REPAIR OF EPIGASTRIC HERNIA N/A 07/28/2021    Procedure: ROBOTIC REPAIR, HERNIA, EPIGASTRIC;  Surgeon: Brett Gastelum MD;  Location: [facility] OR [address] [place] [address];  Service: General;  Laterality: N/A;   "Incarcerated visceral hernia repair       Family History   Problem Relation Name Age of Onset    Asthma Mother Brooke Grubbs     COPD Mother Brooke Grubbs     Hearing loss Mother Brooke Grubbs     Heart disease Mother Brooke Grubbs     Hypertension Mother Brooke Grubbs     Kidney disease Mother Brooke Grubbs     Cancer Father Denny Grubbs Sr.     Lung cancer Father Denny Grubbs Sr.     Hypertension Brother Austen Grubbs     Diabetes Brother Austen Grubbs      Social History     Tobacco Use    Smoking status: Never     Passive exposure: Never    Smokeless tobacco: Never   Substance Use Topics    Alcohol use: Yes     Alcohol/week: 6.0 standard drinks of alcohol     Types: 6 Cans of beer per week     Comment: Socially    Drug use: Never        Review of Systems:  Review of Systems   Constitutional:  Negative for chills and fever.   Respiratory:  Negative for cough and shortness of breath.    Cardiovascular:  Negative for chest pain and palpitations.   Gastrointestinal:  Positive for abdominal pain. Negative for diarrhea, nausea and vomiting.        (+) abdominal bulge    Genitourinary:  Negative for dysuria and hematuria.   Musculoskeletal:  Negative for falls and myalgias.   Skin:  Negative for itching and rash.   Neurological:  Negative for dizziness and headaches.   Endo/Heme/Allergies:  Negative for environmental allergies. Does not bruise/bleed easily.   Psychiatric/Behavioral:  Negative for substance abuse. The patient is not nervous/anxious.    All other systems reviewed and are negative.      OBJECTIVE:     Vital Signs (Most Recent)  BP (!) 158/96 (BP Location: Right arm, Patient Position: Sitting)   Pulse 81   Ht 5' 11" (1.803 m)   Wt 120.8 kg (266 lb 5.1 oz)   SpO2 100%   BMI 37.14 kg/m²     Physical Exam  Vitals and nursing note reviewed.   Constitutional:       General: He is not in acute distress.     Appearance: Normal appearance. He is not ill-appearing or diaphoretic.      " Comments: Room air   HENT:      Head: Normocephalic and atraumatic.      Mouth/Throat:      Mouth: Mucous membranes are moist.      Pharynx: Oropharynx is clear.   Eyes:      General: No scleral icterus.     Extraocular Movements: Extraocular movements intact.      Conjunctiva/sclera: Conjunctivae normal.      Pupils: Pupils are equal, round, and reactive to light.   Neck:      Trachea: No tracheal deviation.   Cardiovascular:      Rate and Rhythm: Normal rate and regular rhythm.   Pulmonary:      Effort: Pulmonary effort is normal. No respiratory distress.      Breath sounds: No stridor.   Abdominal:      General: There is no distension.      Palpations: Abdomen is soft.      Tenderness: There is no abdominal tenderness. There is no guarding or rebound.      Hernia: A hernia is present.      Comments: Soft, reducible ventral hernia at midline above umbilicus, measuring 4-6 cm wide. Not TTP. No overlying skin changes. No peritonitic signs   Soft reducible umbilical hernia also present   Musculoskeletal:         General: No deformity or signs of injury.      Cervical back: No edema or erythema.   Skin:     General: Skin is warm and dry.      Coloration: Skin is not jaundiced.   Neurological:      General: No focal deficit present.      Mental Status: He is alert and oriented to person, place, and time.   Psychiatric:         Mood and Affect: Mood normal.         Behavior: Behavior normal.         ASSESSMENT/PLAN:     Rl Grubbs is a 47 y.o. male with PMHx of HTN, depression, HLD, GERD, TOBIN who presents to the clinic today complaining of abdominal discomfort and bulge concerning for ventral hernia. Clinically stable without signs of incarceration/strangulation/obstruction, interested in repair     -tentatively plan for robotic incisional hernia repair with mesh.  We will order MRI for the abdomen to evaluate renal mass and will discuss further requires any further treatment.    Brett Gastelum MD  Acute Care  Surgery  [facility] Department of Surgery

## 2025-02-26 ENCOUNTER — TELEPHONE (OUTPATIENT)
Dept: UROLOGY | Facility: CLINIC | Age: 50
End: 2025-02-26
Payer: COMMERCIAL

## 2025-02-26 NOTE — TELEPHONE ENCOUNTER
Instructions for Day of Surgery      Report To:    The [facility], located across from [address] side of the 1st floor of the hospital    Arrival time: 5am    Night Before Surgery     Nothing to eat or drink after midnight the night before your surgery  Take medications as instructed the morning of surgery  No alcoholic beverages 24 hours prior to surgery

## 2025-02-27 ENCOUNTER — RESEARCH ENCOUNTER (OUTPATIENT)
Dept: UROLOGY | Facility: CLINIC | Age: 50
End: 2025-02-27
Payer: COMMERCIAL

## 2025-02-27 ENCOUNTER — ANESTHESIA (OUTPATIENT)
Dept: SURGERY | Facility: HOSPITAL | Age: 50
End: 2025-02-27
Payer: COMMERCIAL

## 2025-02-27 ENCOUNTER — HOSPITAL ENCOUNTER (INPATIENT)
Facility: HOSPITAL | Age: 50
LOS: 1 days | Discharge: HOME | End: 2025-02-28
Attending: UROLOGY | Admitting: UROLOGY
Payer: COMMERCIAL

## 2025-02-27 DIAGNOSIS — N28.89 RIGHT RENAL MASS: Primary | ICD-10-CM

## 2025-02-27 DIAGNOSIS — Z01.818 PREOPERATIVE TESTING: ICD-10-CM

## 2025-02-27 LAB
ABO + RH BLD: NORMAL
ANION GAP SERPL CALC-SCNC: 11 MMOL/L (ref 8–16)
ANION GAP SERPL CALC-SCNC: 4 MMOL/L (ref 8–16)
ANION GAP SERPL CALC-SCNC: 8 MMOL/L (ref 8–16)
ANION GAP SERPL CALC-SCNC: 9 MMOL/L (ref 8–16)
BLD GP AB SCN CELLS X3 SERPL QL: NORMAL
BUN SERPL-MCNC: 15 MG/DL (ref 6–20)
BUN SERPL-MCNC: 15 MG/DL (ref 6–20)
BUN SERPL-MCNC: 16 MG/DL (ref 6–20)
BUN SERPL-MCNC: 17 MG/DL (ref 6–20)
CALCIUM SERPL-MCNC: 8.4 MG/DL (ref 8.7–10.5)
CALCIUM SERPL-MCNC: 8.5 MG/DL (ref 8.7–10.5)
CALCIUM SERPL-MCNC: 8.8 MG/DL (ref 8.7–10.5)
CALCIUM SERPL-MCNC: 8.9 MG/DL (ref 8.7–10.5)
CHLORIDE SERPL-SCNC: 101 MMOL/L (ref 95–110)
CHLORIDE SERPL-SCNC: 103 MMOL/L (ref 95–110)
CHLORIDE SERPL-SCNC: 104 MMOL/L (ref 95–110)
CHLORIDE SERPL-SCNC: 107 MMOL/L (ref 95–110)
CO2 SERPL-SCNC: 20 MMOL/L (ref 23–29)
CO2 SERPL-SCNC: 22 MMOL/L (ref 23–29)
CO2 SERPL-SCNC: 23 MMOL/L (ref 23–29)
CO2 SERPL-SCNC: 24 MMOL/L (ref 23–29)
CREAT SERPL-MCNC: 1.2 MG/DL (ref 0.5–1.4)
CREAT SERPL-MCNC: 1.4 MG/DL (ref 0.5–1.4)
CREAT SERPL-MCNC: 1.4 MG/DL (ref 0.5–1.4)
CREAT SERPL-MCNC: 1.6 MG/DL (ref 0.5–1.4)
EST. GFR  (NO RACE VARIABLE): 51.8 ML/MIN/1.73 M^2
EST. GFR  (NO RACE VARIABLE): >60 ML/MIN/1.73 M^2
GLUCOSE SERPL-MCNC: 153 MG/DL (ref 70–110)
GLUCOSE SERPL-MCNC: 165 MG/DL (ref 70–110)
GLUCOSE SERPL-MCNC: 181 MG/DL (ref 70–110)
GLUCOSE SERPL-MCNC: 181 MG/DL (ref 70–110)
GLUCOSE SERPL-MCNC: 204 MG/DL (ref 70–110)
GLUCOSE SERPL-MCNC: 318 MG/DL (ref 70–110)
HCO3 UR-SCNC: 22.9 MMOL/L (ref 24–28)
HCO3 UR-SCNC: 24.5 MMOL/L (ref 24–28)
HCT VFR BLD CALC: 42 %PCV (ref 36–54)
HCT VFR BLD CALC: 43 %PCV (ref 36–54)
PCO2 BLDA: 46.9 MMHG (ref 35–45)
PCO2 BLDA: 52.3 MMHG (ref 35–45)
PH SMN: 7.28 [PH] (ref 7.35–7.45)
PH SMN: 7.3 [PH] (ref 7.35–7.45)
PO2 BLDA: 109 MMHG (ref 80–100)
PO2 BLDA: 115 MMHG (ref 80–100)
POC BE: -2 MMOL/L
POC BE: -4 MMOL/L
POC IONIZED CALCIUM: 1.16 MMOL/L (ref 1.06–1.42)
POC IONIZED CALCIUM: 1.17 MMOL/L (ref 1.06–1.42)
POC SATURATED O2: 97 % (ref 95–100)
POC SATURATED O2: 98 % (ref 95–100)
POC TCO2: 24 MMOL/L (ref 23–27)
POC TCO2: 26 MMOL/L (ref 23–27)
POTASSIUM BLD-SCNC: 5.8 MMOL/L (ref 3.5–5.1)
POTASSIUM BLD-SCNC: 6.4 MMOL/L (ref 3.5–5.1)
POTASSIUM SERPL-SCNC: 3.9 MMOL/L (ref 3.5–5.1)
POTASSIUM SERPL-SCNC: 3.9 MMOL/L (ref 3.5–5.1)
POTASSIUM SERPL-SCNC: 4.2 MMOL/L (ref 3.5–5.1)
POTASSIUM SERPL-SCNC: 4.6 MMOL/L (ref 3.5–5.1)
POTASSIUM SERPL-SCNC: 4.6 MMOL/L (ref 3.5–5.1)
POTASSIUM SERPL-SCNC: 6 MMOL/L (ref 3.5–5.1)
SAMPLE: ABNORMAL
SAMPLE: ABNORMAL
SODIUM BLD-SCNC: 133 MMOL/L (ref 136–145)
SODIUM BLD-SCNC: 135 MMOL/L (ref 136–145)
SODIUM SERPL-SCNC: 133 MMOL/L (ref 136–145)
SODIUM SERPL-SCNC: 134 MMOL/L (ref 136–145)
SODIUM SERPL-SCNC: 134 MMOL/L (ref 136–145)
SODIUM SERPL-SCNC: 135 MMOL/L (ref 136–145)
SPECIMEN OUTDATE: NORMAL

## 2025-02-27 PROCEDURE — 63600175 PHARM REV CODE 636 W HCPCS

## 2025-02-27 PROCEDURE — 25000003 PHARM REV CODE 250

## 2025-02-27 PROCEDURE — 88307 TISSUE EXAM BY PATHOLOGIST: CPT | Performed by: PATHOLOGY

## 2025-02-27 PROCEDURE — 94761 N-INVAS EAR/PLS OXIMETRY MLT: CPT

## 2025-02-27 PROCEDURE — 63600175 PHARM REV CODE 636 W HCPCS: Performed by: STUDENT IN AN ORGANIZED HEALTH CARE EDUCATION/TRAINING PROGRAM

## 2025-02-27 PROCEDURE — 71000015 HC POSTOP RECOV 1ST HR: Performed by: UROLOGY

## 2025-02-27 PROCEDURE — 94640 AIRWAY INHALATION TREATMENT: CPT

## 2025-02-27 PROCEDURE — 27201423 OPTIME MED/SURG SUP & DEVICES STERILE SUPPLY: Performed by: UROLOGY

## 2025-02-27 PROCEDURE — 63600175 PHARM REV CODE 636 W HCPCS: Mod: JZ,TB

## 2025-02-27 PROCEDURE — 88307 TISSUE EXAM BY PATHOLOGIST: CPT | Mod: 26,,, | Performed by: PATHOLOGY

## 2025-02-27 PROCEDURE — 36000712 HC OR TIME LEV V 1ST 15 MIN: Performed by: UROLOGY

## 2025-02-27 PROCEDURE — 63600175 PHARM REV CODE 636 W HCPCS: Mod: JZ,TB | Performed by: STUDENT IN AN ORGANIZED HEALTH CARE EDUCATION/TRAINING PROGRAM

## 2025-02-27 PROCEDURE — 50543 LAPARO PARTIAL NEPHRECTOMY: CPT | Mod: RT,,, | Performed by: UROLOGY

## 2025-02-27 PROCEDURE — 25000242 PHARM REV CODE 250 ALT 637 W/ HCPCS

## 2025-02-27 PROCEDURE — 80048 BASIC METABOLIC PNL TOTAL CA: CPT | Mod: 91 | Performed by: ANESTHESIOLOGY

## 2025-02-27 PROCEDURE — 25000003 PHARM REV CODE 250: Performed by: STUDENT IN AN ORGANIZED HEALTH CARE EDUCATION/TRAINING PROGRAM

## 2025-02-27 PROCEDURE — 76942 ECHO GUIDE FOR BIOPSY: CPT

## 2025-02-27 PROCEDURE — 8E0W4CZ ROBOTIC ASSISTED PROCEDURE OF TRUNK REGION, PERCUTANEOUS ENDOSCOPIC APPROACH: ICD-10-PCS | Performed by: UROLOGY

## 2025-02-27 PROCEDURE — 25000003 PHARM REV CODE 250: Performed by: UROLOGY

## 2025-02-27 PROCEDURE — 36415 COLL VENOUS BLD VENIPUNCTURE: CPT | Performed by: ANESTHESIOLOGY

## 2025-02-27 PROCEDURE — 0TB04ZZ EXCISION OF RIGHT KIDNEY, PERCUTANEOUS ENDOSCOPIC APPROACH: ICD-10-PCS | Performed by: UROLOGY

## 2025-02-27 PROCEDURE — C1727 CATH, BAL TIS DIS, NON-VAS: HCPCS | Performed by: UROLOGY

## 2025-02-27 PROCEDURE — 27200677 HC TRANSDUCER MONITOR KIT SINGLE: Performed by: ANESTHESIOLOGY

## 2025-02-27 PROCEDURE — 27800505 HC CATH, RADIAL ARTERY KIT: Performed by: ANESTHESIOLOGY

## 2025-02-27 PROCEDURE — 37000008 HC ANESTHESIA 1ST 15 MINUTES: Performed by: UROLOGY

## 2025-02-27 PROCEDURE — 37000009 HC ANESTHESIA EA ADD 15 MINS: Performed by: UROLOGY

## 2025-02-27 PROCEDURE — 71000033 HC RECOVERY, INTIAL HOUR: Performed by: UROLOGY

## 2025-02-27 PROCEDURE — 21400001 HC TELEMETRY ROOM

## 2025-02-27 PROCEDURE — 86901 BLOOD TYPING SEROLOGIC RH(D): CPT

## 2025-02-27 PROCEDURE — 36000713 HC OR TIME LEV V EA ADD 15 MIN: Performed by: UROLOGY

## 2025-02-27 PROCEDURE — 71000016 HC POSTOP RECOV ADDL HR: Performed by: UROLOGY

## 2025-02-27 PROCEDURE — 80048 BASIC METABOLIC PNL TOTAL CA: CPT | Performed by: UROLOGY

## 2025-02-27 RX ORDER — FENTANYL CITRATE 50 UG/ML
25 INJECTION, SOLUTION INTRAMUSCULAR; INTRAVENOUS EVERY 5 MIN PRN
Status: DISCONTINUED | OUTPATIENT
Start: 2025-02-27 | End: 2025-02-27 | Stop reason: HOSPADM

## 2025-02-27 RX ORDER — CALCIUM CHLORIDE DIHYDRATE 100 MG/ML
INJECTION, SOLUTION INTRAVENOUS
Status: DISCONTINUED | OUTPATIENT
Start: 2025-02-27 | End: 2025-02-27

## 2025-02-27 RX ORDER — FENTANYL CITRATE 50 UG/ML
INJECTION, SOLUTION INTRAMUSCULAR; INTRAVENOUS
Status: DISCONTINUED | OUTPATIENT
Start: 2025-02-27 | End: 2025-02-27

## 2025-02-27 RX ORDER — PHENYLEPHRINE HCL IN 0.9% NACL 1 MG/10 ML
SYRINGE (ML) INTRAVENOUS
Status: DISCONTINUED | OUTPATIENT
Start: 2025-02-27 | End: 2025-02-27

## 2025-02-27 RX ORDER — BUPIVACAINE HYDROCHLORIDE 7.5 MG/ML
INJECTION, SOLUTION EPIDURAL; RETROBULBAR
Status: COMPLETED | OUTPATIENT
Start: 2025-02-27 | End: 2025-02-27

## 2025-02-27 RX ORDER — LIDOCAINE HYDROCHLORIDE 20 MG/ML
INJECTION, SOLUTION EPIDURAL; INFILTRATION; INTRACAUDAL; PERINEURAL
Status: DISCONTINUED | OUTPATIENT
Start: 2025-02-27 | End: 2025-02-27

## 2025-02-27 RX ORDER — KETOROLAC TROMETHAMINE 15 MG/ML
15 INJECTION, SOLUTION INTRAMUSCULAR; INTRAVENOUS EVERY 6 HOURS
Status: DISCONTINUED | OUTPATIENT
Start: 2025-02-27 | End: 2025-02-28 | Stop reason: HOSPADM

## 2025-02-27 RX ORDER — ALBUTEROL SULFATE 2.5 MG/.5ML
10 SOLUTION RESPIRATORY (INHALATION) ONCE
Status: COMPLETED | OUTPATIENT
Start: 2025-02-27 | End: 2025-02-27

## 2025-02-27 RX ORDER — DEXAMETHASONE SODIUM PHOSPHATE 4 MG/ML
INJECTION, SOLUTION INTRA-ARTICULAR; INTRALESIONAL; INTRAMUSCULAR; INTRAVENOUS; SOFT TISSUE
Status: DISCONTINUED | OUTPATIENT
Start: 2025-02-27 | End: 2025-02-27

## 2025-02-27 RX ORDER — ACETAMINOPHEN 500 MG
1000 TABLET ORAL
Status: COMPLETED | OUTPATIENT
Start: 2025-02-27 | End: 2025-02-27

## 2025-02-27 RX ORDER — PROPOFOL 10 MG/ML
VIAL (ML) INTRAVENOUS
Status: DISCONTINUED | OUTPATIENT
Start: 2025-02-27 | End: 2025-02-27

## 2025-02-27 RX ORDER — FENTANYL CITRATE 50 UG/ML
25-200 INJECTION, SOLUTION INTRAMUSCULAR; INTRAVENOUS
Status: DISCONTINUED | OUTPATIENT
Start: 2025-02-27 | End: 2025-02-27

## 2025-02-27 RX ORDER — SODIUM CHLORIDE, SODIUM LACTATE, POTASSIUM CHLORIDE, CALCIUM CHLORIDE 600; 310; 30; 20 MG/100ML; MG/100ML; MG/100ML; MG/100ML
INJECTION, SOLUTION INTRAVENOUS CONTINUOUS
Status: DISCONTINUED | OUTPATIENT
Start: 2025-02-27 | End: 2025-02-28 | Stop reason: HOSPADM

## 2025-02-27 RX ORDER — KETAMINE HCL IN 0.9 % NACL 50 MG/5 ML
SYRINGE (ML) INTRAVENOUS
Status: DISCONTINUED | OUTPATIENT
Start: 2025-02-27 | End: 2025-02-27

## 2025-02-27 RX ORDER — HEPARIN SODIUM 5000 [USP'U]/ML
5000 INJECTION, SOLUTION INTRAVENOUS; SUBCUTANEOUS EVERY 8 HOURS
Status: DISCONTINUED | OUTPATIENT
Start: 2025-02-27 | End: 2025-02-28 | Stop reason: HOSPADM

## 2025-02-27 RX ORDER — ROCURONIUM BROMIDE 10 MG/ML
INJECTION, SOLUTION INTRAVENOUS
Status: DISCONTINUED | OUTPATIENT
Start: 2025-02-27 | End: 2025-02-27

## 2025-02-27 RX ORDER — FUROSEMIDE 10 MG/ML
INJECTION INTRAMUSCULAR; INTRAVENOUS
Status: DISCONTINUED | OUTPATIENT
Start: 2025-02-27 | End: 2025-02-27

## 2025-02-27 RX ORDER — KETOROLAC TROMETHAMINE 15 MG/ML
15 INJECTION, SOLUTION INTRAMUSCULAR; INTRAVENOUS
Status: DISCONTINUED | OUTPATIENT
Start: 2025-02-27 | End: 2025-02-27

## 2025-02-27 RX ORDER — LIDOCAINE HYDROCHLORIDE 10 MG/ML
1 INJECTION, SOLUTION EPIDURAL; INFILTRATION; INTRACAUDAL; PERINEURAL ONCE
Status: COMPLETED | OUTPATIENT
Start: 2025-02-27 | End: 2025-02-27

## 2025-02-27 RX ORDER — SODIUM CHLORIDE 0.9 % (FLUSH) 0.9 %
10 SYRINGE (ML) INJECTION
Status: DISCONTINUED | OUTPATIENT
Start: 2025-02-27 | End: 2025-02-27 | Stop reason: HOSPADM

## 2025-02-27 RX ORDER — ONDANSETRON HYDROCHLORIDE 2 MG/ML
INJECTION, SOLUTION INTRAVENOUS
Status: DISCONTINUED | OUTPATIENT
Start: 2025-02-27 | End: 2025-02-27

## 2025-02-27 RX ORDER — LIDOCAINE HYDROCHLORIDE 10 MG/ML
1 INJECTION, SOLUTION EPIDURAL; INFILTRATION; INTRACAUDAL; PERINEURAL ONCE AS NEEDED
Status: DISCONTINUED | OUTPATIENT
Start: 2025-02-27 | End: 2025-02-28 | Stop reason: HOSPADM

## 2025-02-27 RX ORDER — BUPROPION HYDROCHLORIDE 150 MG/1
300 TABLET ORAL DAILY
Status: DISCONTINUED | OUTPATIENT
Start: 2025-02-28 | End: 2025-02-28 | Stop reason: HOSPADM

## 2025-02-27 RX ORDER — CEFAZOLIN 2 G/1
2 INJECTION, POWDER, FOR SOLUTION INTRAMUSCULAR; INTRAVENOUS
Status: COMPLETED | OUTPATIENT
Start: 2025-02-27 | End: 2025-02-27

## 2025-02-27 RX ORDER — TALC
6 POWDER (GRAM) TOPICAL NIGHTLY PRN
Status: DISCONTINUED | OUTPATIENT
Start: 2025-02-27 | End: 2025-02-28 | Stop reason: HOSPADM

## 2025-02-27 RX ORDER — GLUCAGON 1 MG
1 KIT INJECTION
Status: DISCONTINUED | OUTPATIENT
Start: 2025-02-27 | End: 2025-02-27 | Stop reason: HOSPADM

## 2025-02-27 RX ORDER — METHOCARBAMOL 500 MG/1
500 TABLET, FILM COATED ORAL 4 TIMES DAILY
Status: DISCONTINUED | OUTPATIENT
Start: 2025-02-27 | End: 2025-02-28 | Stop reason: HOSPADM

## 2025-02-27 RX ORDER — PREGABALIN 75 MG/1
150 CAPSULE ORAL
Status: COMPLETED | OUTPATIENT
Start: 2025-02-27 | End: 2025-02-27

## 2025-02-27 RX ORDER — ONDANSETRON 8 MG/1
8 TABLET, ORALLY DISINTEGRATING ORAL EVERY 8 HOURS PRN
Status: DISCONTINUED | OUTPATIENT
Start: 2025-02-27 | End: 2025-02-28 | Stop reason: HOSPADM

## 2025-02-27 RX ORDER — FAMOTIDINE 20 MG/1
20 TABLET, FILM COATED ORAL 2 TIMES DAILY
Status: DISCONTINUED | OUTPATIENT
Start: 2025-02-27 | End: 2025-02-28 | Stop reason: HOSPADM

## 2025-02-27 RX ORDER — SCOPOLAMINE 1 MG/3D
1 PATCH, EXTENDED RELEASE TRANSDERMAL
Status: DISCONTINUED | OUTPATIENT
Start: 2025-02-27 | End: 2025-02-27

## 2025-02-27 RX ORDER — ACETAMINOPHEN 500 MG
1000 TABLET ORAL
Status: DISCONTINUED | OUTPATIENT
Start: 2025-02-27 | End: 2025-02-27

## 2025-02-27 RX ORDER — OXYCODONE HYDROCHLORIDE 5 MG/1
5 TABLET ORAL EVERY 4 HOURS PRN
Refills: 0 | Status: DISCONTINUED | OUTPATIENT
Start: 2025-02-27 | End: 2025-02-28 | Stop reason: HOSPADM

## 2025-02-27 RX ORDER — ACETAMINOPHEN 500 MG
1000 TABLET ORAL EVERY 6 HOURS
Status: DISCONTINUED | OUTPATIENT
Start: 2025-02-27 | End: 2025-02-28 | Stop reason: HOSPADM

## 2025-02-27 RX ORDER — LOSARTAN POTASSIUM 50 MG/1
100 TABLET ORAL DAILY
Status: DISCONTINUED | OUTPATIENT
Start: 2025-02-28 | End: 2025-02-28 | Stop reason: HOSPADM

## 2025-02-27 RX ORDER — HALOPERIDOL LACTATE 5 MG/ML
0.5 INJECTION, SOLUTION INTRAMUSCULAR EVERY 10 MIN PRN
Status: DISCONTINUED | OUTPATIENT
Start: 2025-02-27 | End: 2025-02-27 | Stop reason: HOSPADM

## 2025-02-27 RX ORDER — MIDAZOLAM HYDROCHLORIDE 1 MG/ML
.5-4 INJECTION, SOLUTION INTRAMUSCULAR; INTRAVENOUS
Status: DISCONTINUED | OUTPATIENT
Start: 2025-02-27 | End: 2025-02-27

## 2025-02-27 RX ORDER — DEXMEDETOMIDINE HYDROCHLORIDE 100 UG/ML
INJECTION, SOLUTION INTRAVENOUS
Status: DISCONTINUED | OUTPATIENT
Start: 2025-02-27 | End: 2025-02-27

## 2025-02-27 RX ORDER — HYDROMORPHONE HYDROCHLORIDE 1 MG/ML
0.2 INJECTION, SOLUTION INTRAMUSCULAR; INTRAVENOUS; SUBCUTANEOUS EVERY 5 MIN PRN
Status: DISCONTINUED | OUTPATIENT
Start: 2025-02-27 | End: 2025-02-27 | Stop reason: HOSPADM

## 2025-02-27 RX ORDER — HEPARIN SODIUM 5000 [USP'U]/ML
5000 INJECTION, SOLUTION INTRAVENOUS; SUBCUTANEOUS EVERY 8 HOURS
Status: DISCONTINUED | OUTPATIENT
Start: 2025-02-27 | End: 2025-02-27

## 2025-02-27 RX ORDER — PROMETHAZINE HYDROCHLORIDE 12.5 MG/1
25 TABLET ORAL EVERY 6 HOURS PRN
Status: DISCONTINUED | OUTPATIENT
Start: 2025-02-27 | End: 2025-02-28 | Stop reason: HOSPADM

## 2025-02-27 RX ORDER — SODIUM CHLORIDE 9 MG/ML
INJECTION, SOLUTION INTRAVENOUS CONTINUOUS
Status: DISCONTINUED | OUTPATIENT
Start: 2025-02-27 | End: 2025-02-27

## 2025-02-27 RX ADMIN — ROCURONIUM BROMIDE 100 MG: 10 INJECTION, SOLUTION INTRAVENOUS at 10:02

## 2025-02-27 RX ADMIN — ACETAMINOPHEN 1000 MG: 500 TABLET ORAL at 11:02

## 2025-02-27 RX ADMIN — SUGAMMADEX 400 MG: 100 INJECTION, SOLUTION INTRAVENOUS at 01:02

## 2025-02-27 RX ADMIN — CEFAZOLIN 2 G: 2 INJECTION, POWDER, FOR SOLUTION INTRAMUSCULAR; INTRAVENOUS at 11:02

## 2025-02-27 RX ADMIN — KETOROLAC TROMETHAMINE 15 MG: 15 INJECTION, SOLUTION INTRAMUSCULAR; INTRAVENOUS at 06:02

## 2025-02-27 RX ADMIN — HYDROMORPHONE HYDROCHLORIDE 0.2 MG: 1 INJECTION, SOLUTION INTRAMUSCULAR; INTRAVENOUS; SUBCUTANEOUS at 04:02

## 2025-02-27 RX ADMIN — ROCURONIUM BROMIDE 50 MG: 10 INJECTION, SOLUTION INTRAVENOUS at 12:02

## 2025-02-27 RX ADMIN — PROMETHAZINE HYDROCHLORIDE 25 MG: 12.5 TABLET ORAL at 06:02

## 2025-02-27 RX ADMIN — INSULIN HUMAN 10 UNITS: 100 INJECTION, SOLUTION PARENTERAL at 02:02

## 2025-02-27 RX ADMIN — LIDOCAINE HYDROCHLORIDE 0.1 MG: 10 INJECTION, SOLUTION EPIDURAL; INFILTRATION; INTRACAUDAL; PERINEURAL at 09:02

## 2025-02-27 RX ADMIN — CALCIUM CHLORIDE 0.25 G: 100 INJECTION, SOLUTION INTRAVENOUS at 01:02

## 2025-02-27 RX ADMIN — Medication 50 MCG: at 01:02

## 2025-02-27 RX ADMIN — SODIUM CHLORIDE: 0.9 INJECTION, SOLUTION INTRAVENOUS at 10:02

## 2025-02-27 RX ADMIN — HEPARIN SODIUM 5000 UNITS: 5000 INJECTION INTRAVENOUS; SUBCUTANEOUS at 09:02

## 2025-02-27 RX ADMIN — HYDROMORPHONE HYDROCHLORIDE 0.2 MG: 1 INJECTION, SOLUTION INTRAMUSCULAR; INTRAVENOUS; SUBCUTANEOUS at 02:02

## 2025-02-27 RX ADMIN — ALBUTEROL SULFATE 10 MG: 2.5 SOLUTION RESPIRATORY (INHALATION) at 02:02

## 2025-02-27 RX ADMIN — ONDANSETRON 4 MG: 2 INJECTION INTRAMUSCULAR; INTRAVENOUS at 01:02

## 2025-02-27 RX ADMIN — BUPIVACAINE HYDROCHLORIDE 15 ML: 7.5 INJECTION, SOLUTION EPIDURAL; RETROBULBAR at 10:02

## 2025-02-27 RX ADMIN — METHOCARBAMOL 500 MG: 500 TABLET ORAL at 02:02

## 2025-02-27 RX ADMIN — OXYCODONE 5 MG: 5 TABLET ORAL at 02:02

## 2025-02-27 RX ADMIN — FENTANYL CITRATE 100 MCG: 50 INJECTION INTRAMUSCULAR; INTRAVENOUS at 10:02

## 2025-02-27 RX ADMIN — SODIUM CHLORIDE, POTASSIUM CHLORIDE, SODIUM LACTATE AND CALCIUM CHLORIDE: 600; 310; 30; 20 INJECTION, SOLUTION INTRAVENOUS at 01:02

## 2025-02-27 RX ADMIN — PROPOFOL 200 MG: 10 INJECTION, EMULSION INTRAVENOUS at 10:02

## 2025-02-27 RX ADMIN — FAMOTIDINE 20 MG: 20 TABLET, FILM COATED ORAL at 09:02

## 2025-02-27 RX ADMIN — METHOCARBAMOL 500 MG: 500 TABLET ORAL at 09:02

## 2025-02-27 RX ADMIN — METHOCARBAMOL 500 MG: 500 TABLET ORAL at 06:02

## 2025-02-27 RX ADMIN — SCOPOLAMINE 1 PATCH: 1.5 PATCH, EXTENDED RELEASE TRANSDERMAL at 10:02

## 2025-02-27 RX ADMIN — DEXTROSE MONOHYDRATE 50 G: 25 INJECTION, SOLUTION INTRAVENOUS at 02:02

## 2025-02-27 RX ADMIN — Medication 10 MG: at 12:02

## 2025-02-27 RX ADMIN — Medication 100 MCG: at 11:02

## 2025-02-27 RX ADMIN — MIDAZOLAM 2 MG: 1 INJECTION INTRAMUSCULAR; INTRAVENOUS at 10:02

## 2025-02-27 RX ADMIN — DEXMEDETOMIDINE 8 MCG: 200 INJECTION, SOLUTION INTRAVENOUS at 12:02

## 2025-02-27 RX ADMIN — DEXMEDETOMIDINE 8 MCG: 200 INJECTION, SOLUTION INTRAVENOUS at 11:02

## 2025-02-27 RX ADMIN — PREGABALIN 150 MG: 75 CAPSULE ORAL at 09:02

## 2025-02-27 RX ADMIN — DEXAMETHASONE SODIUM PHOSPHATE 8 MG: 4 INJECTION INTRA-ARTICULAR; INTRALESIONAL; INTRAMUSCULAR; INTRAVENOUS; SOFT TISSUE at 10:02

## 2025-02-27 RX ADMIN — ACETAMINOPHEN 1000 MG: 500 TABLET ORAL at 09:02

## 2025-02-27 RX ADMIN — SODIUM CHLORIDE, SODIUM GLUCONATE, SODIUM ACETATE, POTASSIUM CHLORIDE, MAGNESIUM CHLORIDE, SODIUM PHOSPHATE, DIBASIC, AND POTASSIUM PHOSPHATE: .53; .5; .37; .037; .03; .012; .00082 INJECTION, SOLUTION INTRAVENOUS at 10:02

## 2025-02-27 RX ADMIN — Medication 10 MG: at 01:02

## 2025-02-27 RX ADMIN — ACETAMINOPHEN 1000 MG: 500 TABLET ORAL at 06:02

## 2025-02-27 RX ADMIN — KETOROLAC TROMETHAMINE 15 MG: 15 INJECTION, SOLUTION INTRAMUSCULAR; INTRAVENOUS at 09:02

## 2025-02-27 RX ADMIN — FUROSEMIDE 20 MG: 10 INJECTION, SOLUTION INTRAMUSCULAR; INTRAVENOUS at 01:02

## 2025-02-27 RX ADMIN — Medication 30 MG: at 10:02

## 2025-02-27 RX ADMIN — KETOROLAC TROMETHAMINE 15 MG: 15 INJECTION, SOLUTION INTRAMUSCULAR; INTRAVENOUS at 11:02

## 2025-02-27 RX ADMIN — SODIUM CHLORIDE: 9 INJECTION, SOLUTION INTRAVENOUS at 09:02

## 2025-02-27 RX ADMIN — LIDOCAINE HYDROCHLORIDE 100 MG: 20 INJECTION, SOLUTION EPIDURAL; INFILTRATION; INTRACAUDAL at 10:02

## 2025-02-27 RX ADMIN — Medication 100 MCG: at 01:02

## 2025-02-27 NOTE — LETTER
February 28, 2025         [address]  [facility] [address]  Phone: [phone]  Fax: [phone]       Patient: Rl Grubbs   YOB: 1975  Date of Visit: 02/28/2025    To Whom It May Concern:    Carlos Grubbs  was at [facility] on 02/28/2025. The patient may return to work/school on March 14th with heavy lifting restrictions (for 6 weeks, nothing over 10lbs). If you have any questions or concerns, or if I can be of further assistance, please do not hesitate to contact me.    Sincerely,    Frederick Kearns MD

## 2025-02-27 NOTE — INTERVAL H&P NOTE
The patient has been examined and the H&P has been reviewed:    I concur with the findings and no changes have occurred since H&P was written.    Surgery risks, benefits and alternative options discussed and understood by patient/family.    Patient was assessed preoperatively, found to be appropriate in behavior. The risks, benefits, and alternatives to procedures were discussed, and questions were answered. Plan to proceed with described procedure.    Patient Active Problem List    Diagnosis Date Noted    Anxiety 02/17/2025    Constipation 02/17/2025    History of COVID-19 02/17/2025    Nausea 02/17/2025    Lumbar spondylosis 10/09/2023    BMI 36.0-36.9,adult 07/17/2023    Post-nasal drip 01/18/2023    Abdominal weakness 05/09/2022    Pure hypertriglyceridemia 06/14/2021    Encounter for hepatitis C screening test for low risk patient 09/10/2020    Encounter for screening for HIV 09/10/2020    Lumbar back pain 03/05/2020    Benign essential hypertension 09/05/2019    Gastroesophageal reflux disease 09/05/2019    Mixed hyperlipidemia 09/05/2019    Depression, major, recurrent, mild 09/05/2019    Prostate cancer screening 09/05/2019    TOBIN (obstructive sleep apnea) 09/05/2019

## 2025-02-27 NOTE — PROGRESS NOTES
Bmp was drawn before arriving to pacu for hyperkalemia. Pt was shifted with Regular insulin 10 units and Dextrose 50 grams.Respiratory treatment was give after.

## 2025-02-27 NOTE — PROGRESS NOTES
IRB# 2011.222.A  [facility] Renal Protocol  Date: February 27, 2025    Specimen (right renal mass and 2 purple top tubes) was retrieved from OR 23 by Simran Flores.   Specimen was brought to pathology where a slice of normal and tumor was taken per Steffanie protocol and brought to Dr. Valiente's lab along with the blood.    Simran Flores RN

## 2025-02-27 NOTE — ANESTHESIA PROCEDURE NOTES
Intubation    Date/Time: 2/27/2025 10:39 AM    Performed by: Bryant Tuttle DO  Authorized by: Lazaro Armendariz MD    Intubation:     Induction:  Intravenous    Intubated:  Postinduction    Mask Ventilation:  2 handed mask ventilation with 2 providers (thick beard)    Attempts:  1    Attempted By:  Student and resident anesthesiologist    Method of Intubation:  Video laryngoscopy    Blade:  Barrientos 3    Laryngeal View Grade: Grade IIA - cords partially seen      Difficult Airway Encountered?: No      Complications:  None    Airway Device:  Oral endotracheal tube    Airway Device Size:  7.5    Placement Verified By:  Capnometry    Complicating Factors:  Obesity and beard    Findings Post-Intubation:  BS equal bilateral and atraumatic/condition of teeth unchanged

## 2025-02-27 NOTE — ANESTHESIA PROCEDURE NOTES
Arterial    Diagnosis: Right renal mass    Patient location during procedure: done in OR    Staffing  Authorizing Provider: Lazaro Armendariz MD  Performing Provider: Bryant Tuttle DO    Staffing  Performed by: Bryant Tuttle DO  Authorized by: Lazaro Armendariz MD    Anesthesiologist was present at the time of the procedure.    Preanesthetic Checklist  Completed: patient identified, IV checked, site marked, risks and benefits discussed, surgical consent, monitors and equipment checked, pre-op evaluation, timeout performed and anesthesia consent givenArterial  Skin Prep: chlorhexidine gluconate and isopropyl alcohol  Local Infiltration: none  Orientation: left  Location: radial    Catheter Size: 20 G  Catheter placement by Ultrasound guidance. Heme positive aspiration all ports.   Vessel Caliber: large, patent, compressibility normal  Needle advanced into vessel with real time Ultrasound guidance.Insertion Attempts: 1  Assessment  Dressing: secured with tape and tegaderm  Patient: Tolerated well

## 2025-02-27 NOTE — ANESTHESIA PROCEDURE NOTES
R MATT SS    Patient location during procedure: pre-op   Block not for primary anesthetic.  Reason for block: at surgeon's request and post-op pain management   Post-op Pain Location: right abdominal pain   Start time: 2/27/2025 10:10 AM  Timeout: 2/27/2025 10:09 AM   End time: 2/27/2025 10:15 AM    Staffing  Authorizing Provider: Delroy Salamanca MD  Performing Provider: Pino Rowell MD    Staffing  Performed by: Pino Rowell MD  Authorized by: Delroy Salamanca MD    Preanesthetic Checklist  Completed: patient identified, IV checked, site marked, risks and benefits discussed, surgical consent, monitors and equipment checked, pre-op evaluation and timeout performed  Peripheral Block  Patient position: sitting  Prep: ChloraPrep  Patient monitoring: heart rate, cardiac monitor, continuous pulse ox, continuous capnometry and frequent blood pressure checks  Block type: erector spinae plane  Laterality: right  Injection technique: single shot  Interspace: T9-10    Needle  Needle type: Stimuplex   Needle gauge: 17 G  Needle length: 4 in  Needle localization: anatomical landmarks and ultrasound guidance   -ultrasound image captured on disc.  Assessment  Injection assessment: negative aspiration, negative parasthesia and local visualized surrounding nerve  Paresthesia pain: none  Heart rate change: no  Slow fractionated injection: yes  Pain Tolerance: comfortable throughout block and no complaints  Medications:    Medications: bupivacaine (pf) (MARCAINE) injection 0.75% - Perineural   15 mL - 2/27/2025 10:16:00 AM    Additional Notes  A time out was conducted. Site landon confirmed with team and patient. Allergies reviewed.   Vital signs stable throughout block. RN monitoring vitals throughout.   Needle advanced under continuous ultrasound guidance.  Local injected incrementally after confirming negative aspiration. No signs or symptoms of intravascular or intraneural injection noted.   No persistent  paresthesias elicited or expressed. Patient tolerated procedure well.  30cc of 0.375% bupi with epinephrine 1:300K, PF dexamethasone 1 mg, and clonidine 50 mcg used for the block.

## 2025-02-27 NOTE — BRIEF OP NOTE
[facility] - [facility] ([facility])  Brief Operative Note    SUMMARY     Surgery Date: 2/27/2025     Surgeons and Role:     * Guzman Christianson MD - Primary     * Frederick Kearns MD - Resident - Assisting     * Jose Robert MD - Resident - Chief        Pre-op Diagnosis:  Right renal mass [N28.89]    Post-op Diagnosis:  Post-Op Diagnosis Codes:     * Right renal mass [N28.89]    Procedure(s) (LRB):  DV5 ROBOTIC NEPHRECTOMY, PARTIAL (Right)    Anesthesia: General/Regional    Implants:  * No implants in log *    Operative Findings:   Right retroperitoneal partial nephrectomy completed without immediate complication    Estimated Blood Loss: * No values recorded between 2/27/2025 11:06 AM and 2/27/2025  1:14 PM *    Estimated Blood Loss has not been documented. EBL = 50 cc.         Specimens:   Specimen (24h ago, onward)       Start     Ordered    02/27/25 1312  Specimen to Pathology, Surgery Urology  Once        Comments: Pre-op Diagnosis: Right renal mass [N28.89]Procedure(s):DV5 ROBOTIC NEPHRECTOMY, PARTIAL Number of specimens: 1Name of specimens: 1. Right renal mass - perm     References:    Click here for ordering Quick Tip   Question Answer Comment   Procedure Type: Urology    Release to patient Immediate        02/27/25 1311                    [id number]

## 2025-02-27 NOTE — OP NOTE
[facility]  Operative Note     Date: 02/27/2025      Pre-Op Diagnosis: Right renal mass suspicious for renal cell carcinoma  Patient Active Problem List    Diagnosis Date Noted    Right renal mass 02/27/2025    Anxiety 02/17/2025    Constipation 02/17/2025    History of COVID-19 02/17/2025    Nausea 02/17/2025    Lumbar spondylosis 10/09/2023    BMI 36.0-36.9,adult 07/17/2023    Post-nasal drip 01/18/2023    Abdominal weakness 05/09/2022    Pure hypertriglyceridemia 06/14/2021    Encounter for hepatitis C screening test for low risk patient 09/10/2020    Encounter for screening for HIV 09/10/2020    Lumbar back pain 03/05/2020    Benign essential hypertension 09/05/2019    Gastroesophageal reflux disease 09/05/2019    Mixed hyperlipidemia 09/05/2019    Depression, major, recurrent, mild 09/05/2019    Prostate cancer screening 09/05/2019    TOBIN (obstructive sleep apnea) 09/05/2019         Post-Op Diagnosis: same     Procedure(s) Performed:   1.  Right robotic retroperitoneal partial nephrectomy     Specimen(s):    Right renal mass     Surgeon: Guzman Christianson MD     Bedside assistant: Mikie SHARP     Assistant: MD James Zhu MD     Anesthesia: General endotracheal anesthesia     Indications: Rl Grubbs is a 49 y.o. male  with a right renal mass suspicious for renal cell carcinoma. After discussion of management options and risks and benefits associated with each the patient has elected to pursue surgical management.     Findings:   - Right retroperitoneal robotic nephrectomy completed without immediate complication     EBL: 50 mL     Drains:   1.  16 Fr perea catheter     Anesthesia: General endotracheal anesthesia     Complications:  none     Procedure in Detail: After discussion of risks and benefits of the procedure, informed consent was obtained.  The patient was brought to the operating room and placed supine on the operating table.  SCDs were applied and  working prior to induction of anesthesia.  General anesthesia was administered. A 16 Fr Timmons catheter was placed in the standard fashion with 10 ml sterile water used to inflate the balloon. An OG tube was placed. The patient was then moved into the full flank position with the right side up. The patient was appropriately padded and secured to the table. The patient was then prepped and draped in the usual sterile fashion. Timeout was performed and preoperative antibiotics were confirmed.       The location of the camera port was marked one finger breath above the ASIS in the mid axillary line. This was incised sharply and carried down through the subcutaneous tissue. The lumbodorsal fascia was entered using a tonsil. The retroperitoneum was palpated and dissected bluntly to ensure there was adequate room for the balloon dilator. The retroperitoneal balloon dilator was inserted through the incision and into the retroperitoneum. An 8 mm trocar was inserted through the device, and the balloon was inflated under direct vision. The psoas was identified and the edge of peritoneum was identified. Once the space was adequately dilated the balloon dilator was deflated and removed. The robotic Julio port was inserted and the retroperitoneum was insufflated.     Using direct vision the posterior lateral most port site was positioned 6 cm lateral to the camera port. This was inserted under direct vision. The peritoneum was blunted dissected off of the transversalis muscle on the anterior abdominal wall medially using a laparoscopic Kitner. This created space for the two additional robotic ports medially. Two 8 mm robotic ports were placed medially in a modified arch cephalad ensuring 6 cm of space between ports. A 12 mm assistant port was placed 8 cm inferior to the most medial port. Each trocar was introduced under direct vision ensuring no injury. The robot was docked and the ports were optimized.    The paranephric fat  was mobilized to improve visualization. The Psoas was identified. Intraoperative ultrasound was utilized for orientation, the kidney was visualized using the ultrasound. Gerota's fascia was entered. Dissection was directed posteriorly, and we identified the renal hilum. 1 artery was identified and dissected circumferentially. The ureter was identified.     The perinephric fat overlying the tumor and overlying the kidney was then dissected to identify the mass. The ultrasound probe was introduced and was used to delineate margins which were marked with hot scissors. The needle drivers were then tested adequate.    The renal artery was then clamped with the bulldog clamp.  The mass was then excised using the robotic scissors. Grossly negative margins were achieved. There was no obvious entry into the collecting system. A V lock suture was then used to perform a running stitch of the renorrhaphy bed. A double-arm 0 Vicryl was then used to perform a sliding Hem O Lock renorrhaphy in standard fashion.  Warm ischemia time was 16 minutes.  There was adequate hemostasis at the end of the renorrhaphy.  The specimen was then placed in an Endo-Catch bag.  FloSeal was then applied to the resection bed.     The robot was undocked and all trocars were removed.  The camera port incision was extended slightly to allow removal of the specimen. This was inspected and found to be in tact.  This was passed off the field for pathologic analysis. The extraction site fascia was closed using 0 Vicryl. All skin incisions were closed using 4-0 monocryl. Dermabond was applied to the incisions.     The patient tolerated the procedure well and was transferred to the recovery room in stable condition.    Disposition: The patient will remain on the urology service overnight for observation.     Jose Robert MD     I have reviewed the operative note performed by Dr. Robert, and I concur with her/his documentation of Rl Grubbs. I was  present for the critical or key portions of the procedure.

## 2025-02-27 NOTE — PLAN OF CARE
Patient was prepared for surgery.    Blood sent to blood bank for type and screen.    Consents in chart.

## 2025-02-27 NOTE — TRANSFER OF CARE
Anesthesia Transfer of Care Note    Patient: Rl Grubbs    Procedure(s) Performed: Procedure(s) (LRB):  DV5 ROBOTIC NEPHRECTOMY, PARTIAL (Right)    Patient location: PACU    Anesthesia Type: general    Transport from OR: Transported from OR on 6-10 L/min O2 by face mask with adequate spontaneous ventilation    Post pain: adequate analgesia    Post assessment: no apparent anesthetic complications and tolerated procedure well    Post vital signs: stable    Level of consciousness: awake and alert    Nausea/Vomiting: no nausea/vomiting    Complications: none    Transfer of care protocol was followedComments: Potassium 6.4 on repeat iSTAT after renal mass resection, previously 5.8 before resection of renal mass. No arrythmias or EKG changes noted. T waves maybe slightly peaking? 0.5g of calcium given. STAT BMP drawn and sent off. 20mg furosemide given. Urology resident Majors informed. PACU bedside RN and PACU resident also informed to follow.       Last vitals: Visit Vitals  BP (!) 151/74 (BP Location: Left arm, Patient Position: Lying)   Pulse 96   Resp 18   SpO2 99%

## 2025-02-27 NOTE — NURSING TRANSFER
Nursing Transfer Note      2/27/2025   2:38 PM    Nurse giving handoff:Dashawn alanis Rn  Nurse receiving handoff: Austen Springer    Reason patient is being transferred: postop    Transfer To: [facility]    Transfer via stretcher    Transfer with n/a    Transported by pacu pct    Transfer Vital Signs:  Blood Pressure:see flowsheet  Heart Rate:  O2:  Temperature:  Respirations:    Telemetry: n/a  Order for Tele Monitor? No    Additional Lines: Timmons Catheter    Medicines sent: LR    Any special needs or follow-up needed:     Patient belongings transferred with patient: No    Chart send with patient: Yes    Notified: spouse    Patient reassessed at: 2/27/25  1  Upon arrival to floor: bed in lowest position

## 2025-02-28 VITALS
TEMPERATURE: 97 F | RESPIRATION RATE: 16 BRPM | OXYGEN SATURATION: 98 % | WEIGHT: 262.81 LBS | HEIGHT: 71 IN | BODY MASS INDEX: 36.79 KG/M2 | DIASTOLIC BLOOD PRESSURE: 65 MMHG | SYSTOLIC BLOOD PRESSURE: 111 MMHG | HEART RATE: 63 BPM

## 2025-02-28 LAB
ANION GAP SERPL CALC-SCNC: 11 MMOL/L (ref 8–16)
ANION GAP SERPL CALC-SCNC: 7 MMOL/L (ref 8–16)
ANION GAP SERPL CALC-SCNC: 8 MMOL/L (ref 8–16)
ANION GAP SERPL CALC-SCNC: 9 MMOL/L (ref 8–16)
BASOPHILS # BLD AUTO: 0.01 K/UL (ref 0–0.2)
BASOPHILS NFR BLD: 0.1 % (ref 0–1.9)
BUN SERPL-MCNC: 17 MG/DL (ref 6–20)
BUN SERPL-MCNC: 18 MG/DL (ref 6–20)
CALCIUM SERPL-MCNC: 8.3 MG/DL (ref 8.7–10.5)
CALCIUM SERPL-MCNC: 8.4 MG/DL (ref 8.7–10.5)
CALCIUM SERPL-MCNC: 8.5 MG/DL (ref 8.7–10.5)
CALCIUM SERPL-MCNC: 8.5 MG/DL (ref 8.7–10.5)
CHLORIDE SERPL-SCNC: 103 MMOL/L (ref 95–110)
CHLORIDE SERPL-SCNC: 103 MMOL/L (ref 95–110)
CHLORIDE SERPL-SCNC: 104 MMOL/L (ref 95–110)
CHLORIDE SERPL-SCNC: 104 MMOL/L (ref 95–110)
CO2 SERPL-SCNC: 20 MMOL/L (ref 23–29)
CO2 SERPL-SCNC: 22 MMOL/L (ref 23–29)
CO2 SERPL-SCNC: 23 MMOL/L (ref 23–29)
CO2 SERPL-SCNC: 24 MMOL/L (ref 23–29)
CREAT SERPL-MCNC: 1.2 MG/DL (ref 0.5–1.4)
CREAT SERPL-MCNC: 1.2 MG/DL (ref 0.5–1.4)
CREAT SERPL-MCNC: 1.3 MG/DL (ref 0.5–1.4)
CREAT SERPL-MCNC: 1.3 MG/DL (ref 0.5–1.4)
DIFFERENTIAL METHOD BLD: ABNORMAL
EOSINOPHIL # BLD AUTO: 0 K/UL (ref 0–0.5)
EOSINOPHIL NFR BLD: 0.1 % (ref 0–8)
ERYTHROCYTE [DISTWIDTH] IN BLOOD BY AUTOMATED COUNT: 13.8 % (ref 11.5–14.5)
EST. GFR  (NO RACE VARIABLE): >60 ML/MIN/1.73 M^2
GLUCOSE SERPL-MCNC: 122 MG/DL (ref 70–110)
GLUCOSE SERPL-MCNC: 123 MG/DL (ref 70–110)
GLUCOSE SERPL-MCNC: 124 MG/DL (ref 70–110)
GLUCOSE SERPL-MCNC: 138 MG/DL (ref 70–110)
HCT VFR BLD AUTO: 35.6 % (ref 40–54)
HGB BLD-MCNC: 11.6 G/DL (ref 14–18)
IMM GRANULOCYTES # BLD AUTO: 0.03 K/UL (ref 0–0.04)
IMM GRANULOCYTES NFR BLD AUTO: 0.3 % (ref 0–0.5)
LYMPHOCYTES # BLD AUTO: 1.6 K/UL (ref 1–4.8)
LYMPHOCYTES NFR BLD: 17.7 % (ref 18–48)
MCH RBC QN AUTO: 27.1 PG (ref 27–31)
MCHC RBC AUTO-ENTMCNC: 32.6 G/DL (ref 32–36)
MCV RBC AUTO: 83 FL (ref 82–98)
MONOCYTES # BLD AUTO: 0.7 K/UL (ref 0.3–1)
MONOCYTES NFR BLD: 8.3 % (ref 4–15)
NEUTROPHILS # BLD AUTO: 6.5 K/UL (ref 1.8–7.7)
NEUTROPHILS NFR BLD: 73.5 % (ref 38–73)
NRBC BLD-RTO: 0 /100 WBC
PLATELET # BLD AUTO: 218 K/UL (ref 150–450)
PMV BLD AUTO: 9.1 FL (ref 9.2–12.9)
POTASSIUM SERPL-SCNC: 4.2 MMOL/L (ref 3.5–5.1)
POTASSIUM SERPL-SCNC: 4.2 MMOL/L (ref 3.5–5.1)
POTASSIUM SERPL-SCNC: 4.3 MMOL/L (ref 3.5–5.1)
POTASSIUM SERPL-SCNC: 4.3 MMOL/L (ref 3.5–5.1)
POTASSIUM SERPL-SCNC: 4.4 MMOL/L (ref 3.5–5.1)
POTASSIUM SERPL-SCNC: 4.7 MMOL/L (ref 3.5–5.1)
POTASSIUM SERPL-SCNC: 4.7 MMOL/L (ref 3.5–5.1)
RBC # BLD AUTO: 4.28 M/UL (ref 4.6–6.2)
SODIUM SERPL-SCNC: 134 MMOL/L (ref 136–145)
SODIUM SERPL-SCNC: 134 MMOL/L (ref 136–145)
SODIUM SERPL-SCNC: 135 MMOL/L (ref 136–145)
SODIUM SERPL-SCNC: 135 MMOL/L (ref 136–145)
WBC # BLD AUTO: 8.79 K/UL (ref 3.9–12.7)

## 2025-02-28 PROCEDURE — 85025 COMPLETE CBC W/AUTO DIFF WBC: CPT | Performed by: STUDENT IN AN ORGANIZED HEALTH CARE EDUCATION/TRAINING PROGRAM

## 2025-02-28 PROCEDURE — 80048 BASIC METABOLIC PNL TOTAL CA: CPT | Mod: 91 | Performed by: ANESTHESIOLOGY

## 2025-02-28 PROCEDURE — 80048 BASIC METABOLIC PNL TOTAL CA: CPT | Performed by: STUDENT IN AN ORGANIZED HEALTH CARE EDUCATION/TRAINING PROGRAM

## 2025-02-28 PROCEDURE — 36415 COLL VENOUS BLD VENIPUNCTURE: CPT | Performed by: ANESTHESIOLOGY

## 2025-02-28 PROCEDURE — 25000003 PHARM REV CODE 250: Performed by: STUDENT IN AN ORGANIZED HEALTH CARE EDUCATION/TRAINING PROGRAM

## 2025-02-28 PROCEDURE — 63600175 PHARM REV CODE 636 W HCPCS: Mod: JZ,TB | Performed by: STUDENT IN AN ORGANIZED HEALTH CARE EDUCATION/TRAINING PROGRAM

## 2025-02-28 RX ORDER — ONDANSETRON 4 MG/1
4 TABLET, ORALLY DISINTEGRATING ORAL EVERY 8 HOURS PRN
Qty: 10 TABLET | Refills: 0 | Status: SHIPPED | OUTPATIENT
Start: 2025-02-28 | End: 2025-03-11

## 2025-02-28 RX ORDER — OXYCODONE HYDROCHLORIDE 5 MG/1
5 TABLET ORAL EVERY 4 HOURS PRN
Qty: 10 TABLET | Refills: 0 | Status: SHIPPED | OUTPATIENT
Start: 2025-02-28 | End: 2025-03-11

## 2025-02-28 RX ORDER — DEXTROMETHORPHAN HYDROBROMIDE, GUAIFENESIN 5; 100 MG/5ML; MG/5ML
650 LIQUID ORAL EVERY 8 HOURS
COMMUNITY
Start: 2025-02-28 | End: 2025-03-11

## 2025-02-28 RX ORDER — IBUPROFEN 800 MG/1
800 TABLET ORAL 3 TIMES DAILY
COMMUNITY
Start: 2025-02-28 | End: 2025-03-10

## 2025-02-28 RX ORDER — POLYETHYLENE GLYCOL 3350 17 G/17G
17 POWDER, FOR SOLUTION ORAL DAILY
Qty: 510 G | Refills: 0 | Status: SHIPPED | OUTPATIENT
Start: 2025-02-28

## 2025-02-28 RX ADMIN — ACETAMINOPHEN 1000 MG: 500 TABLET ORAL at 05:02

## 2025-02-28 RX ADMIN — METHOCARBAMOL 500 MG: 500 TABLET ORAL at 08:02

## 2025-02-28 RX ADMIN — LOSARTAN POTASSIUM 100 MG: 50 TABLET, FILM COATED ORAL at 08:02

## 2025-02-28 RX ADMIN — HEPARIN SODIUM 5000 UNITS: 5000 INJECTION INTRAVENOUS; SUBCUTANEOUS at 05:02

## 2025-02-28 RX ADMIN — OXYCODONE 5 MG: 5 TABLET ORAL at 08:02

## 2025-02-28 RX ADMIN — FAMOTIDINE 20 MG: 20 TABLET, FILM COATED ORAL at 08:02

## 2025-02-28 RX ADMIN — KETOROLAC TROMETHAMINE 15 MG: 15 INJECTION, SOLUTION INTRAMUSCULAR; INTRAVENOUS at 05:02

## 2025-02-28 RX ADMIN — BUPROPION HYDROCHLORIDE 300 MG: 150 TABLET, EXTENDED RELEASE ORAL at 08:02

## 2025-02-28 NOTE — PLAN OF CARE
He is discharged now to home with spouse  All lda's are removed.   All home meds are picked up from downstairs pharm.  He has walked the halls several times.   He tolerated his meal without nausea.   He voided 300 ml's to the urinal.   Pain is well controled.   He voiced understanding to dc instructions.   He voiced understanding to needs to walk short distances every hour while awake at home.   He is aaox4 and pleasant at discharge.   He discharges to home in private car at this time.

## 2025-02-28 NOTE — PLAN OF CARE
[facility] - [facility]  Discharge Final Note    Primary Care Provider: Tomer Roberto MD    Expected Discharge Date: 2/28/2025    Final Discharge Note (most recent)       Final Note - 02/28/25 1514          Final Note    Assessment Type Final Discharge Note     Anticipated Discharge Disposition Home or Self Care     What phone number can be called within the next 1-3 days to see how you are doing after discharge? --   [phone]                    Important Message from Medicare             Contact Info       Guzman Christianson MD   Specialty: Urology    [address] [facility] [address]   Phone: [phone]       Next Steps: Follow up in 1 month(s)            Future Appointments   Date Time Provider Department Center   3/25/2025  1:00 PM Guzman Christianson MD [facility] UROLOG [place]   4/16/2025 10:30 AM Tomer Roberto MD [facility] [place]     Patient discharged home to care of family on 2/28/25.    Nicole Locke RN  Case Management  [facility]  [phone]

## 2025-02-28 NOTE — NURSING
Pt ambulated hallways. Tolerated well. NADN. No c/o pain or dizziness. Will continue to monitor.

## 2025-02-28 NOTE — HPI
Rl Grubbs is a 49 y.o. male  with a right renal mass suspicious for renal cell carcinoma. After discussion of management options and risks and benefits associated with each the patient has elected to pursue surgical management. Patient is now s/p partial nephrectomy on 2/27/25 with Dr. Christianson.

## 2025-02-28 NOTE — PLAN OF CARE
Pt AAOx4 and VSS . Progressing with plan of care. Free of skin breakdown as the pt positioned/repositioned well independently. Clean, dry, and intact dressing noted on abd. Timmons in place. SCDs in place at all times. Incentive spirometer at bedside and pt instructed on its use. Pain controlled well with scheduled meds. Frequent rounds made to assess pain and safety and no complaints at this time noted. Side rails up x 2. Bed locked. Call light within reach. No falls noted. Will continue to monitor.    Problem: Adult Inpatient Plan of Care  Goal: Plan of Care Review  Outcome: Progressing  Goal: Patient-Specific Goal (Individualized)  Outcome: Progressing  Goal: Absence of Hospital-Acquired Illness or Injury  Outcome: Progressing  Goal: Optimal Comfort and Wellbeing  Outcome: Progressing  Goal: Readiness for Transition of Care  Outcome: Progressing     Problem: Infection  Goal: Absence of Infection Signs and Symptoms  Outcome: Progressing     Problem: Wound  Goal: Optimal Coping  Outcome: Progressing  Goal: Optimal Functional Ability  Outcome: Progressing  Goal: Absence of Infection Signs and Symptoms  Outcome: Progressing  Goal: Improved Oral Intake  Outcome: Progressing  Goal: Optimal Pain Control and Function  Outcome: Progressing  Goal: Skin Health and Integrity  Outcome: Progressing  Goal: Optimal Wound Healing  Outcome: Progressing     Problem: Fall Injury Risk  Goal: Absence of Fall and Fall-Related Injury  Outcome: Progressing

## 2025-02-28 NOTE — ANESTHESIA POSTPROCEDURE EVALUATION
Anesthesia Post Evaluation    Patient: Rl Grubbs    Procedure(s) Performed: Procedure(s) (LRB):  DV5 ROBOTIC NEPHRECTOMY, PARTIAL (Right)    Final Anesthesia Type: general      Patient location during evaluation: PACU  Patient participation: Yes- Able to Participate  Level of consciousness: awake and alert  Post-procedure vital signs: reviewed and stable  Pain management: adequate  Airway patency: patent  TOBIN mitigation strategies: Extubation while patient is awake, Multimodal analgesia and Use of major conduction anesthesia (spinal/epidural) or peripheral nerve block  PONV status at discharge: No PONV  Anesthetic complications: no      Cardiovascular status: stable  Respiratory status: unassisted and spontaneous ventilation  Hydration status: euvolemic  Follow-up not needed.              Vitals Value Taken Time   /60 02/28/25 05:56   Temp 36.6 °C (97.9 °F) 02/28/25 05:56   Pulse 79 02/28/25 05:56   Resp 18 02/28/25 05:56   SpO2 97 % 02/28/25 04:40         Event Time   Out of Recovery 14:45:00         Pain/Radha Score: Pain Rating Prior to Med Admin: 3 (2/28/2025  5:17 AM)  Pain Rating Post Med Admin: 3 (2/28/2025  5:47 AM)  Radha Score: 10 (2/27/2025  5:30 PM)

## 2025-02-28 NOTE — DISCHARGE SUMMARY
[facility]  Urology  Discharge Summary      Patient Name: Rl Grubbs  MRN: [id number]  Admission Date: 2/27/2025  Hospital Length of Stay: 1 days  Discharge Date and Time:  02/28/2025 6:38 AM  Attending Physician: Guzman Christianson MD   Discharging Provider: Niko Green DO  Primary Care Physician: Tomer Roberto MD    HPI:   Rl Grubbs is a 49 y.o. male  with a right renal mass suspicious for renal cell carcinoma. After discussion of management options and risks and benefits associated with each the patient has elected to pursue surgical management. Patient is now s/p partial nephrectomy on 2/27/25 with Dr. Christianson.     Procedure(s) (LRB):  DV5 ROBOTIC NEPHRECTOMY, PARTIAL (Right)     Indwelling Lines/Drains at time of discharge:   Lines/Drains/Airways       Drain  Duration                  Urethral Catheter 02/27/25 1040 Double-lumen;Non-latex;Straight-tip;Silicone 16 Fr. <1 day                    Hospital Course (synopsis of major diagnoses, care, treatment, and services provided during the course of the hospital stay): The patient was admitted to [facility] for the above procedure. Patient tolerated the procedure well in its entirety without issue. For more details, please refer to the complete operative note. he was transferred to recovery post-op and then to the floor. Once on the floor His diet was advanced and he ambulated the night of surgery. On POD 1 the patient was tolerating a regular diet, ambulating without difficulty.His pain was well controlled.    Physical exam was appropriate for post operative state. The incisions were clean, dry and intact. The perea was draining clear yellow urine. The perea was removed and he was able to void without difficulty. The patient was deemed stable for discharge on 02/28/2025.  .      Goals of Care Treatment Preferences:  Code Status: Full Code      Consults:     Significant Diagnostic Studies: None    Pending Diagnostic Studies:        Procedure Component Value Units Date/Time    Specimen to Pathology, Surgery Urology [[id number]] Collected: 02/27/25 1314    Order Status: Sent Lab Status: In process Updated: 02/27/25 1434    Specimen: Tissue             Final Active Diagnoses:    Diagnosis Date Noted POA    PRINCIPAL PROBLEM:  Right renal mass [N28.89] 02/27/2025 Yes      Problems Resolved During this Admission:         Discharged Condition: good    Disposition: Home or Self Care    Follow Up:   Follow-up Information       Guzman Christianson MD Follow up in 1 month(s).    Specialty: Urology  Contact information:  [address]  [facility] [address]  [phone]                           Patient Instructions:      Comprehensive Metabolic Panel   Standing Status: Future Number of Occurrences: 1 Standing Exp. Date: 04/06/26     Order Specific Question Answer Comments   Send normal result to authorizing provider's In Basket if patient is active on MyChart: Yes      CBC Without Differential   Standing Status: Future Number of Occurrences: 1 Standing Exp. Date: 04/06/26     Order Specific Question Answer Comments   Send normal result to authorizing provider's In Basket if patient is active on MyChart: Yes      No dressing needed     Notify your health care provider if you experience any of the following:  temperature >100.4     Notify your health care provider if you experience any of the following:  persistent nausea and vomiting or diarrhea     Notify your health care provider if you experience any of the following:  severe uncontrolled pain     Notify your health care provider if you experience any of the following:  difficulty breathing or increased cough     Notify your health care provider if you experience any of the following:  severe persistent headache     Notify your health care provider if you experience any of the following:  worsening rash     Notify your health care provider if you experience any of the following:  persistent  dizziness, light-headedness, or visual disturbances     EKG 12-lead   Standing Status: Future Number of Occurrences: 1 Standing Exp. Date: 02/05/26     Type & Screen   Standing Status: Future Number of Occurrences: 1 Standing Exp. Date: 04/06/26     Activity as tolerated     Medications:  Reconciled Home Medications:      Medication List        START taking these medications      acetaminophen 650 MG Tbsr  Commonly known as: TYLENOL  Take 1 tablet (650 mg total) by mouth every 8 (eight) hours. for 10 days     ibuprofen 800 MG tablet  Commonly known as: ADVIL,MOTRIN  Take 1 tablet (800 mg total) by mouth 3 (three) times daily. for 10 days     ondansetron 4 MG Tbdl  Commonly known as: ZOFRAN-ODT  Take 1 tablet (4 mg total) by mouth every 8 (eight) hours as needed.     oxyCODONE 5 MG immediate release tablet  Commonly known as: ROXICODONE  Take 1 tablet (5 mg total) by mouth every 4 (four) hours as needed for Pain.     polyethylene glycol 17 gram/dose powder  Commonly known as: GLYCOLAX  Take 17 g by mouth once daily.            ASK your doctor about these medications      buPROPion 300 MG 24 hr tablet  Commonly known as: WELLBUTRIN XL  TAKE 1 TABLET(300 MG) BY MOUTH EVERY DAY     cetirizine 10 MG tablet  Commonly known as: ZYRTEC  Take 10 mg by mouth once daily.     esomeprazole 40 MG capsule  Commonly known as: NEXIUM  TAKE 1 CAPSULE(40 MG) BY MOUTH EVERY DAY     fenofibrate 145 MG tablet  Commonly known as: TRICOR  Take 1 tablet (145 mg total) by mouth once daily.     irbesartan 300 MG tablet  Commonly known as: AVAPRO  TAKE 1 TABLET(300 MG) BY MOUTH EVERY DAY              Time spent on the discharge of patient: 15 minutes    Niko Green DO  Urology  [facility] - Surgery

## 2025-02-28 NOTE — PROGRESS NOTES
[facility]  Urology  Progress Note    Patient Name: Rl Grubbs  MRN: [id number]  Admission Date: 2/27/2025  Hospital Length of Stay: 1 days  Code Status: Full Code   Attending Provider: Guzman Christianson MD   Primary Care Physician: Tomer Roberto MD    Subjective:     HPI:  Rl Grubbs is a 49 y.o. male  with a right renal mass suspicious for renal cell carcinoma. After discussion of management options and risks and benefits associated with each the patient has elected to pursue surgical management. Patient is now s/p partial nephrectomy on 2/27/25 with Dr. Christianson.     Interval History: AFVSS. Did had an episode of vomiting after first attempting to eat. Has since resolved. Pain controlled. Ambulated. Timmons draining clear, yellow urine.       Objective:     Temp:  [97.3 °F (36.3 °C)-98.6 °F (37 °C)] 97.9 °F (36.6 °C)  Pulse:  [] 79  Resp:  [10-25] 18  SpO2:  [91 %-100 %] 97 %  BP: (112-168)/(60-81) 112/60     Body mass index is 36.65 kg/m².           Drains       Drain  Duration                  Urethral Catheter 02/27/25 1040 Double-lumen;Non-latex;Straight-tip;Silicone 16 Fr. <1 day                     Physical Exam  Constitutional:       General: He is not in acute distress.     Appearance: Normal appearance.   HENT:      Head: Normocephalic and atraumatic.   Eyes:      Extraocular Movements: Extraocular movements intact.      Pupils: Pupils are equal, round, and reactive to light.   Cardiovascular:      Rate and Rhythm: Normal rate.   Pulmonary:      Effort: Pulmonary effort is normal. No respiratory distress.   Abdominal:      General: Abdomen is flat. There is no distension.      Tenderness: There is no abdominal tenderness. There is no right CVA tenderness or left CVA tenderness.      Comments: Incisions c/d/I.   Genitourinary:     Comments: Timmons in place draining clear, yellow urine.   Skin:     Coloration: Skin is not jaundiced.   Neurological:      General: No focal  deficit present.      Mental Status: He is alert and oriented to person, place, and time.   Psychiatric:         Mood and Affect: Mood normal.         Behavior: Behavior normal.           Significant Labs:    BMP:  Recent Labs   Lab 02/27/25  1907 02/27/25  2355 02/28/25  0408   * 134* 134*   K 4.6  4.6 4.7  4.7 4.3  4.3    103 103   CO2 24 24 22*   BUN 17 18 17   CREATININE 1.4 1.2 1.3   CALCIUM 8.9 8.5* 8.4*       CBC:   Recent Labs   Lab 02/27/25  1232 02/27/25  1313 02/28/25  0408   WBC  --   --  8.79   HGB  --   --  11.6*   HCT 43 42 35.6*   PLT  --   --  218       All pertinent labs results from the past 24 hours have been reviewed.    Significant Imaging:  All pertinent imaging results/findings from the past 24 hours have been reviewed.                  Assessment/Plan:     * Right renal mass  Rl Grubbs is a 49 y.o. male  with a right renal mass suspicious for renal cell carcinoma. Patient is now s/p partial nephrectomy on 2/27/25 with Dr. Christianson.     -- Timmons removed. VT this AM  -- Ambulate as tolerated  -- Diet as tolerated  -- PRN Zofran for nausea  -- MM PO Pain control    Dispo: Home today pending voiding trial and tolerating PO diet.         VTE Risk Mitigation (From admission, onward)           Ordered     heparin (porcine) injection 5,000 Units  Every 8 hours         02/27/25 1317                    Niko Green DO  Urology  [facility]

## 2025-02-28 NOTE — PLAN OF CARE
Problem: Adult Inpatient Plan of Care  Goal: Plan of Care Review  2/27/2025 1859 by Austen Swann RN  Outcome: Progressing  2/27/2025 1859 by Austen Swann RN  Outcome: Progressing  Goal: Patient-Specific Goal (Individualized)  2/27/2025 1859 by Austen Swann RN  Outcome: Progressing  2/27/2025 1859 by Austen Swann RN  Outcome: Progressing  Goal: Absence of Hospital-Acquired Illness or Injury  2/27/2025 1859 by Austen Swann RN  Outcome: Progressing  2/27/2025 1859 by Austen Swann RN  Outcome: Progressing  Goal: Optimal Comfort and Wellbeing  2/27/2025 1859 by Austen Swann RN  Outcome: Progressing  2/27/2025 1859 by Austen Swann RN  Outcome: Progressing  Goal: Readiness for Transition of Care  2/27/2025 1859 by Austen Swann RN  Outcome: Progressing  2/27/2025 1859 by Austen Swann RN  Outcome: Progressing     Problem: Infection  Goal: Absence of Infection Signs and Symptoms  2/27/2025 1859 by Austen Swann RN  Outcome: Progressing  2/27/2025 1859 by Austen Swann RN  Outcome: Progressing     Problem: Wound  Goal: Optimal Coping  2/27/2025 1859 by Austen Swann RN  Outcome: Progressing  2/27/2025 1859 by Austen Swann RN  Outcome: Progressing  Goal: Optimal Functional Ability  2/27/2025 1859 by Austen Swann RN  Outcome: Progressing  2/27/2025 1859 by Austen Swann RN  Outcome: Progressing  Goal: Absence of Infection Signs and Symptoms  2/27/2025 1859 by Austen Swann RN  Outcome: Progressing  2/27/2025 1859 by Austen Swann RN  Outcome: Progressing  Goal: Improved Oral Intake  2/27/2025 1859 by Austen Swann RN  Outcome: Progressing  2/27/2025 1859 by Austen Swann RN  Outcome: Progressing  Goal: Optimal Pain Control and Function  2/27/2025 1859 by Austen Swann RN  Outcome: Progressing  2/27/2025 1859 by Austen Swann RN  Outcome: Progressing  Goal: Skin Health and Integrity  2/27/2025 1859 by Austen Swann, RN  Outcome: Progressing  2/27/2025 1859 by Shree  Austen, MAGNOLIA  Outcome: Progressing  Goal: Optimal Wound Healing  2/27/2025 1859 by Austen Swann, RN  Outcome: Progressing  2/27/2025 1859 by Austen Swann, RN  Outcome: Progressing   He is resting quiet.   Some nausea noted and po anti nausea given as ordered.   Timmons is patent to the bedside bag.   The bed is in the lowest position and the call light is within reach.

## 2025-02-28 NOTE — ASSESSMENT & PLAN NOTE
Rl Grubbs is a 49 y.o. male  with a right renal mass suspicious for renal cell carcinoma. Patient is now s/p partial nephrectomy on 2/27/25 with Dr. Christianson.     -- Timmons removed. VT this AM  -- Ambulate as tolerated  -- Diet as tolerated  -- PRN Zofran for nausea  -- MM PO Pain control    Dispo: Home today pending voiding trial and tolerating PO diet.

## 2025-02-28 NOTE — NURSING
He is a 50 yo male patient admitted to room 509 from PACU after a right partial nephrectomy.   He is aaox4 and very pleasant.   His wife is at the bedside.   SCD's are connected to the pump.  Teaching completed with the IS.  Teaming completed in reference need to ambulate at least once before bedtime.   He reports pain 3/10.   Skin is warm and dry.  X4 lap sites are clean, dry, intact and no sign of dehiscence.   Lungs sound clear to auscultation.   Heart tones are audible.   Telemetry box TTX 0424 in place and NSR noted on the monitor.  Timmons catheter is draining clear yellow urine to the bsb.  4 eyes skin check is completed.   The bed is in the lowest position and the call light is within reach.

## 2025-02-28 NOTE — PLAN OF CARE
[facility]  Discharge Assessment    Primary Care Provider: Tomer Roberto MD     Discharge Assessment (most recent)       BRIEF DISCHARGE ASSESSMENT - 02/28/25 0949          Discharge Planning    Assessment Type Discharge Planning Brief Assessment     Resource/Environmental Concerns none     Support Systems Spouse/significant other     DME Needed Upon Discharge  none     Discharge Plan A Home with family                         Spoke with patient and spouse at bedside to complete d/c planning assessment. Patient lives with spouse in a single story home without steps. He is Independent with ADL's. Home DME includes a CPAP. Verified PCP, Pharmacy and health insurance. Patient to d/c home today to care of his wife. Discharge Plan A and Plan B have been determined by review of patient's clinical status, future medical and therapeutic needs, and coverage/benefits for post-acute care in coordination with multidisciplinary team members.      Nicole Locke RNCM  Case Management  [facility]  [phone]

## 2025-02-28 NOTE — SUBJECTIVE & OBJECTIVE
Interval History: AFVSS. Did had an episode of vomiting after first attempting to eat. Has since resolved. Pain controlled. Ambulated. Timmons draining clear, yellow urine.       Objective:     Temp:  [97.3 °F (36.3 °C)-98.6 °F (37 °C)] 97.9 °F (36.6 °C)  Pulse:  [] 79  Resp:  [10-25] 18  SpO2:  [91 %-100 %] 97 %  BP: (112-168)/(60-81) 112/60     Body mass index is 36.65 kg/m².           Drains       Drain  Duration                  Urethral Catheter 02/27/25 1040 Double-lumen;Non-latex;Straight-tip;Silicone 16 Fr. <1 day                     Physical Exam  Constitutional:       General: He is not in acute distress.     Appearance: Normal appearance.   HENT:      Head: Normocephalic and atraumatic.   Eyes:      Extraocular Movements: Extraocular movements intact.      Pupils: Pupils are equal, round, and reactive to light.   Cardiovascular:      Rate and Rhythm: Normal rate.   Pulmonary:      Effort: Pulmonary effort is normal. No respiratory distress.   Abdominal:      General: Abdomen is flat. There is no distension.      Tenderness: There is no abdominal tenderness. There is no right CVA tenderness or left CVA tenderness.      Comments: Incisions c/d/I.   Genitourinary:     Comments: Timmons in place draining clear, yellow urine.   Skin:     Coloration: Skin is not jaundiced.   Neurological:      General: No focal deficit present.      Mental Status: He is alert and oriented to person, place, and time.   Psychiatric:         Mood and Affect: Mood normal.         Behavior: Behavior normal.           Significant Labs:    BMP:  Recent Labs   Lab 02/27/25  1907 02/27/25  2355 02/28/25  0408   * 134* 134*   K 4.6  4.6 4.7  4.7 4.3  4.3    103 103   CO2 24 24 22*   BUN 17 18 17   CREATININE 1.4 1.2 1.3   CALCIUM 8.9 8.5* 8.4*       CBC:   Recent Labs   Lab 02/27/25  1232 02/27/25  1313 02/28/25  0408   WBC  --   --  8.79   HGB  --   --  11.6*   HCT 43 42 35.6*   PLT  --   --  218       All pertinent  labs results from the past 24 hours have been reviewed.    Significant Imaging:  All pertinent imaging results/findings from the past 24 hours have been reviewed.

## 2025-02-28 NOTE — DISCHARGE INSTRUCTIONS
What to expect with your Partial Nephrectomy.  [facility]    After surgery  You may or may not have a drain that is shaped like a grenade and put to suction  This drain usually may or may not come out on Post op day 1. If you go home with a drain, the nurses will teach you how to record the output and you will come back 3-5 days after you leave to have the drain removed in clinic.  You will be on bedrest overnight. The next morning we will come by and see you and take you off bedrest sometime mid morning, that is when your catheter will come out.   You will have a catheter after your surgery. It should come out the next day and you should be able to void on your own before you leave. If you are a male and on a BPH medicine, we will need to make sure you restart that the night of your surgery.  The night of surgery we expect and hope that you will:  Eat - you do not have to eat a whole meal, but we want to make sure you can tolerate liquid and/or solid food without nausea and vomiting  Use your incentive spirometer - this is the breathing apparatus that helps you expand your lungs. If and when you have pain you will not want to take deep breaths. But if you dont take deep breaths, you are at risk for pneumonia. The incentive spirometer will help prevent this from occurring by expanding your lungs.  Symptoms you may experience immediately post-op:  Bloating and/or shoulder pain if you had a laparascopic procedure- when we do this operation, we fill up your abdominal cavity with gas to better help us visualize the organs and allow our instruments to fit. After the surgery, not all the air can be removed and your body will eventually absorb this small amount of air. However this can make you feel bloated. In addition, when you sit up, the air can sit right under a muscle (the diaphragm) which has connecting nerves to the shoulders, which could explain why you have shoulder pain.  Do not expect necessarily to have  gas or to have a bowel movement - this goes along with the bloating, you may feel like you want to pass gas or have a bowel movement but you cant. This is normal and you will feel like this for a couple days. There are no pills to help with this. Small walks throughout the day should help with this.  Pain - your pain should be able to be controlled with medicines by mouth that we prescribe. It is important for you to tell us if you are on any pain medications at home before the surgery as you may need stronger pain meds while in the hospital.  You can go home when:  Pain is controlled with medicines by mouth  You are able to walk without difficulty or pain  You are tolerating a regulating diet  Your are voiding. If you cannot void we may have to replace a catheter and you will follow up 3-5 days after you leave to have a voiding trial. The nurses will teach you how to care for your catheter.  When you go home:  Blood pressure  Your blood pressure must be well controlled (<140 systolic blood pressure), if youre blood pressure is not controlled, there is an increased risk of bleeding.  Activity  Continue to walk - small walks throughout the day are better than one long walk.   Do not lift anything greater than 8 pounds for 6 weeks - we want your abdominal wall muscles to heal.  Bowel Movements - Do not strain to have a bowel movement - the pain medicines will make you constipated. That is why we also ask you to take colace 2-3 x per day to help keep your bowels regular. If you are still having trouble, then you can also add Miralax once a day. Do not take any stool softeners if you are having diarrhea.  Drain - If you have a drain (not your catheter, but a separate drain) then record the output and bring it with you to your next appointment  Smoking - If you smoke, we encourage you to STOP. Smoking interferes with the healing process and your prolong your healing with continued smoking.  Driving - Do not drive while  you are on pain meds or with your catheter in place.  Bathing - If you do not have a drain, you can shower 48 hours after your surgery. If you do have a drain, sponge bathe only until the drain is out.  Dressing - you can remove the dressings if there is no drainage or change them as needed if there is. The little sterile band-aid strips will fall off on their own in 10-14 days. If they have not fallen off then you can remove them yourself.  Restarting medicines -especially blood thinners (Aspirin, Plavix, Coumadin), Fish Oil. Discuss this with your physician prior to discharge.  When to return to the ER  Fever - If you have a fever >101.5. This could be due to a number of reasons such as infection of the urine or incision. If your catheter has been removed, you could possibly have a leak. It would be best to come to the ER so they can better evaluate you.  Severe pain - pain is expected, but severe or new onset of pain is not normal.   Inability to tolerate food or liquid with nausea and vomiting - it would be best to go to the ER for them to better evaluate you.

## 2025-03-03 LAB
FINAL PATHOLOGIC DIAGNOSIS: NORMAL
GROSS: NORMAL
Lab: NORMAL

## 2025-03-06 ENCOUNTER — PATIENT MESSAGE (OUTPATIENT)
Dept: UROLOGY | Facility: CLINIC | Age: 50
End: 2025-03-06
Payer: COMMERCIAL

## 2025-03-06 ENCOUNTER — PATIENT MESSAGE (OUTPATIENT)
Dept: DERMATOLOGY | Facility: CLINIC | Age: 50
End: 2025-03-06
Payer: COMMERCIAL

## 2025-03-07 ENCOUNTER — PATIENT OUTREACH (OUTPATIENT)
Dept: ADMINISTRATIVE | Facility: CLINIC | Age: 50
End: 2025-03-07
Payer: COMMERCIAL

## 2025-03-07 ENCOUNTER — RESULTS FOLLOW-UP (OUTPATIENT)
Dept: UROLOGY | Facility: CLINIC | Age: 50
End: 2025-03-07

## 2025-03-07 NOTE — PROGRESS NOTES
C3 nurse spoke with Rl Grubbs  for a TCC post hospital discharge follow up call. The patient has a scheduled [facility] appointment with Tomer Roberto MD  on 03/11/2025 @ 0361.

## 2025-03-07 NOTE — TELEPHONE ENCOUNTER
The patient states he is taking acetaminophen (TYLENOL) 650 mg tablet and ibuprofen (ADVIL, MOTRIN) 800 mg tablet as needed and over the counter hydrocortisone cream topically twice daily.

## 2025-03-11 ENCOUNTER — LAB VISIT (OUTPATIENT)
Dept: LAB | Facility: HOSPITAL | Age: 50
End: 2025-03-11
Attending: INTERNAL MEDICINE
Payer: COMMERCIAL

## 2025-03-11 ENCOUNTER — OFFICE VISIT (OUTPATIENT)
Dept: PRIMARY CARE CLINIC | Facility: CLINIC | Age: 50
End: 2025-03-11
Payer: COMMERCIAL

## 2025-03-11 VITALS
HEART RATE: 100 BPM | SYSTOLIC BLOOD PRESSURE: 108 MMHG | BODY MASS INDEX: 36.17 KG/M2 | OXYGEN SATURATION: 96 % | HEIGHT: 71 IN | WEIGHT: 258.38 LBS | RESPIRATION RATE: 18 BRPM | DIASTOLIC BLOOD PRESSURE: 80 MMHG

## 2025-03-11 DIAGNOSIS — Z90.5 H/O PARTIAL NEPHRECTOMY: ICD-10-CM

## 2025-03-11 DIAGNOSIS — R73.02 IMPAIRED GLUCOSE TOLERANCE: ICD-10-CM

## 2025-03-11 DIAGNOSIS — C64.1 RENAL CELL CARCINOMA OF RIGHT KIDNEY: Primary | ICD-10-CM

## 2025-03-11 LAB
ALBUMIN SERPL BCP-MCNC: 4 G/DL (ref 3.5–5.2)
ALP SERPL-CCNC: 55 U/L (ref 40–150)
ALT SERPL W/O P-5'-P-CCNC: 19 U/L (ref 10–44)
ANION GAP SERPL CALC-SCNC: 10 MMOL/L (ref 8–16)
AST SERPL-CCNC: 16 U/L (ref 10–40)
BILIRUB SERPL-MCNC: 0.5 MG/DL (ref 0.1–1)
BUN SERPL-MCNC: 17 MG/DL (ref 6–20)
CALCIUM SERPL-MCNC: 9.9 MG/DL (ref 8.7–10.5)
CHLORIDE SERPL-SCNC: 100 MMOL/L (ref 95–110)
CO2 SERPL-SCNC: 26 MMOL/L (ref 23–29)
CREAT SERPL-MCNC: 1.3 MG/DL (ref 0.5–1.4)
EST. GFR  (NO RACE VARIABLE): >60 ML/MIN/1.73 M^2
GLUCOSE SERPL-MCNC: 122 MG/DL (ref 70–110)
POTASSIUM SERPL-SCNC: 4.8 MMOL/L (ref 3.5–5.1)
PROT SERPL-MCNC: 7.7 G/DL (ref 6–8.4)
SODIUM SERPL-SCNC: 136 MMOL/L (ref 136–145)

## 2025-03-11 PROCEDURE — 3008F BODY MASS INDEX DOCD: CPT | Mod: CPTII,S$GLB,, | Performed by: INTERNAL MEDICINE

## 2025-03-11 PROCEDURE — 3079F DIAST BP 80-89 MM HG: CPT | Mod: CPTII,S$GLB,, | Performed by: INTERNAL MEDICINE

## 2025-03-11 PROCEDURE — 3074F SYST BP LT 130 MM HG: CPT | Mod: CPTII,S$GLB,, | Performed by: INTERNAL MEDICINE

## 2025-03-11 PROCEDURE — 99999 PR PBB SHADOW E&M-EST. PATIENT-LVL IV: CPT | Mod: PBBFAC,,, | Performed by: INTERNAL MEDICINE

## 2025-03-11 PROCEDURE — 1159F MED LIST DOCD IN RCRD: CPT | Mod: CPTII,S$GLB,, | Performed by: INTERNAL MEDICINE

## 2025-03-11 PROCEDURE — 1160F RVW MEDS BY RX/DR IN RCRD: CPT | Mod: CPTII,S$GLB,, | Performed by: INTERNAL MEDICINE

## 2025-03-11 PROCEDURE — 99214 OFFICE O/P EST MOD 30 MIN: CPT | Mod: S$GLB,,, | Performed by: INTERNAL MEDICINE

## 2025-03-11 PROCEDURE — 36415 COLL VENOUS BLD VENIPUNCTURE: CPT | Performed by: INTERNAL MEDICINE

## 2025-03-11 PROCEDURE — 1111F DSCHRG MED/CURRENT MED MERGE: CPT | Mod: CPTII,S$GLB,, | Performed by: INTERNAL MEDICINE

## 2025-03-11 PROCEDURE — 80053 COMPREHEN METABOLIC PANEL: CPT | Performed by: INTERNAL MEDICINE

## 2025-03-11 PROCEDURE — 4010F ACE/ARB THERAPY RXD/TAKEN: CPT | Mod: CPTII,S$GLB,, | Performed by: INTERNAL MEDICINE

## 2025-03-11 NOTE — PROGRESS NOTES
[facility] Note    Chief Complaint      Chief Complaint   Patient presents with    Hospital Follow Up       History of Present Illness      Rl Grubbs is a 49 y.o. male who presents today for   Chief Complaint   Patient presents with    Hospital Follow Up   .  Patient comes to appointment here for hosp f/u fro newly diagnosed renal cell ca s/p right nephrectomy with Dr Aguirre . Patient had scheduled visit in January with his gen sirg for hernia evaluation . Ct scan was ordered to assess and right kidney mass was detected . He was referred to urology after mri confirmed pr cense of suspicious mass in kidney . After evaluation he was scheduled for partial  nephrectomy . Np further treatment deemed necessary he will have surveillance scans in future with Dr aguirre     HPI    No problem-specific Assessment & Plan notes found for this encounter.       Problem List Items Addressed This Visit       Renal cell carcinoma of right kidney - Primary    Overview   See hpi , Dr aguirre will be managing surveillance going forward          H/O partial nephrectomy    Overview   Stable          Impaired glucose tolerance    Overview   Repeat fasting blood sugar today              Past Medical History:  Past Medical History:   Diagnosis Date    Anxiety     GERD (gastroesophageal reflux disease)     Hyperlipidemia     Hypertension     PONV (postoperative nausea and vomiting)     Seasonal allergies 10 years    Sleep apnea 5 years       Past Surgical History:  Past Surgical History:   Procedure Laterality Date    CHOLECYSTECTOMY  7-28-21    DV5 ROBOTIC NEPHRECTOMY, PARTIAL Right 2/27/2025    Procedure: DV5 ROBOTIC NEPHRECTOMY, PARTIAL;  Surgeon: Guzman Aguirre MD;  Location: [facility] OR [address] [place] [address];  Service: Urology;  Laterality: Right;  retroperitoneal    HERNIA REPAIR  7-    ROBOT-ASSISTED CHOLECYSTECTOMY USING DA LORENE XI N/A 07/28/2021    Procedure: XI ROBOTIC CHOLECYSTECTOMY;  Surgeon: Brett  "MADY Gastelum MD;  Location: [address];  Service: General;  Laterality: N/A;    ROBOT-ASSISTED LAPAROSCOPIC REPAIR OF EPIGASTRIC HERNIA N/A 07/28/2021    Procedure: ROBOTIC REPAIR, HERNIA, EPIGASTRIC;  Surgeon: Brett Gastelum MD;  Location: [address];  Service: General;  Laterality: N/A;  Incarcerated visceral hernia repair         Family History:  family history includes Allergies in his mother; Asthma in his mother; COPD in his mother; Cancer in his father and mother; Diabetes in his brother; Hearing loss in his mother; Heart disease in his brother and mother; Hypertension in his brother and mother; Kidney disease in his mother; Lung cancer in his father.     Social History:  Social History[1]    Review of Systems:   Review of Systems   Constitutional:  Negative for fever and weight loss.   HENT:  Negative for congestion, hearing loss and sore throat.    Eyes:  Negative for blurred vision.   Respiratory:  Negative for cough and shortness of breath.    Cardiovascular:  Negative for chest pain, palpitations, claudication and leg swelling.   Gastrointestinal:  Negative for abdominal pain, constipation, diarrhea and heartburn.   Genitourinary:  Negative for dysuria.   Musculoskeletal:  Negative for back pain and myalgias.   Skin:  Negative for rash.   Neurological:  Negative for focal weakness and headaches.   Psychiatric/Behavioral:  Negative for depression and suicidal ideas. The patient is not nervous/anxious.         Medications:  Encounter Medications[2]    Allergies:  Review of patient's allergies indicates:  No Known Allergies      Physical Exam      Vitals:    03/11/25 0939   BP: 108/80   Pulse: 100   Resp: 18        Vital Signs  Pulse: 100  Resp: 18  SpO2: 96 %  BP: 108/80  BP Location: Right arm  Patient Position: Sitting  Pain Score: 0-No pain  Height and Weight  Height: 5' 11" (180.3 cm)  Weight: 117.2 kg (258 lb 6.1 oz)  BSA (Calculated - sq m): 2.42 sq meters  BMI (Calculated): 36.1  Weight " "in (lb) to have BMI = 25: 178.9]     Body mass index is 36.04 kg/m².    Physical Exam  Constitutional:       Appearance: He is well-developed.   HENT:      Head: Normocephalic.   Eyes:      Pupils: Pupils are equal, round, and reactive to light.   Neck:      Thyroid: No thyromegaly.   Cardiovascular:      Rate and Rhythm: Normal rate and regular rhythm.      Heart sounds: No murmur heard.     No friction rub. No gallop.   Pulmonary:      Effort: Pulmonary effort is normal.      Breath sounds: Normal breath sounds.   Abdominal:      General: Bowel sounds are normal.      Palpations: Abdomen is soft.   Musculoskeletal:         General: Normal range of motion.      Cervical back: Normal range of motion.   Skin:     General: Skin is warm and dry.   Neurological:      Mental Status: He is alert and oriented to person, place, and time.      Sensory: No sensory deficit.   Psychiatric:         Behavior: Behavior normal.          Laboratory:  CBC:  Recent Labs   Lab Result Units 02/17/25  1150 02/27/25  1232 02/28/25  0408   WBC K/uL 5.81  --  8.79   RBC M/uL 5.15  --  4.28*   Hemoglobin g/dL 14.1  --  11.6*   POC Hematocrit   --    < >  --    Hematocrit % 42.0  --  35.6*   Platelets K/uL 253  --  218   MCV fL 82  --  83   MCH pg 27.4  --  27.1   MCHC g/dL 33.6  --  32.6    < > = values in this interval not displayed.     CMP:  Recent Labs   Lab Result Units 02/17/25  1150 02/27/25  1338 02/28/25  0813   Glucose mg/dL 203*   < > 122*   Calcium mg/dL 8.8   < > 8.5*   Albumin g/dL 3.7  --   --    Total Protein g/dL 7.0  --   --    Sodium mmol/L 134*   < > 135*   Potassium mmol/L 4.0   < > 4.2  4.2   CO2 mmol/L 22*   < > 23   Chloride mmol/L 104   < > 104   BUN mg/dL 12   < > 17   Alkaline Phosphatase U/L 48  --   --    ALT U/L 17  --   --    AST U/L 16  --   --    Total Bilirubin mg/dL 0.7  --   --     < > = values in this interval not displayed.     URINALYSIS:  No results for input(s): "COLORU", "CLARITYU", "SPECGRAV", " ""PHUR", "PROTEINUA", "GLUCOSEU", "BILIRUBINCON", "BLOODU", "WBCU", "RBCU", "BACTERIA", "MUCUS", "NITRITE", "LEUKOCYTESUR", "UROBILINOGEN", "HYALINECASTS" in the last 2160 hours.   LIPIDS:  No results for input(s): "TSH", "HDL", "CHOL", "TRIG", "LDLCALC", "CHOLHDL", "NONHDLCHOL", "TOTALCHOLEST" in the last 2160 hours.  TSH:  No results for input(s): "TSH" in the last 2160 hours.  A1C:  No results for input(s): "HGBA1C" in the last 2160 hours.    Radiology:        Assessment:     Rl Grubbs is a 49 y.o.male with:    Renal cell carcinoma of right kidney    Impaired glucose tolerance  -     Comprehensive Metabolic Panel; Future; Expected date: 03/11/2025    H/O partial nephrectomy                Plan:     Problem List Items Addressed This Visit       Renal cell carcinoma of right kidney - Primary    Overview   See hpi , Dr aguirre will be managing surveillance going forward          H/O partial nephrectomy    Overview   Stable          Impaired glucose tolerance    Overview   Repeat fasting blood sugar today             As above, continue current medications and maintain follow up with specialists.  Return to clinic in 6 months.      Frederick W Dantagnan [facility]                       [1]   Social History  Socioeconomic History    Marital status:    Tobacco Use    Smoking status: Never     Passive exposure: Never    Smokeless tobacco: Never   Substance and Sexual Activity    Alcohol use: Yes     Alcohol/week: 6.0 standard drinks of alcohol     Types: 6 Cans of beer per week     Comment: Socially    Drug use: Never    Sexual activity: Yes     Partners: Female     Birth control/protection: Post-menopausal, None     Social Drivers of Health     Financial Resource Strain: Low Risk  (2/28/2025)    Overall Financial Resource Strain (CARDIA)     Difficulty of Paying Living Expenses: Not hard at all   Food Insecurity: No Food Insecurity (2/28/2025)    Hunger Vital Sign     " Worried About Running Out of Food in the Last Year: Never true     Ran Out of Food in the Last Year: Never true   Transportation Needs: No Transportation Needs (2023)    PRAPARE - Transportation     Lack of Transportation (Medical): No     Lack of Transportation (Non-Medical): No   Physical Activity: Insufficiently Active (2024)    Exercise Vital Sign     Days of Exercise per Week: 2 days     Minutes of Exercise per Session: 30 min   Stress: No Stress Concern Present (2025)    [employer] - Occupational Stress Questionnaire     Feeling of Stress : Not at all   Housing Stability: Low Risk  (2025)    Housing Stability Vital Sign     Unable to Pay for Housing in the Last Year: No     Homeless in the Last Year: No   [2]   Outpatient Encounter Medications as of 3/11/2025   Medication Sig Dispense Refill    acetaminophen (TYLENOL) 650 MG TbSR Take 1 tablet (650 mg total) by mouth every 8 (eight) hours. for 10 days      buPROPion (WELLBUTRIN XL) 300 MG 24 hr tablet TAKE 1 TABLET(300 MG) BY MOUTH EVERY DAY 90 tablet 3    cetirizine (ZYRTEC) 10 MG tablet Take 10 mg by mouth once daily.      esomeprazole (NEXIUM) 40 MG capsule TAKE 1 CAPSULE(40 MG) BY MOUTH EVERY DAY 90 capsule 3    fenofibrate (TRICOR) 145 MG tablet Take 1 tablet (145 mg total) by mouth once daily. 90 tablet 3    irbesartan (AVAPRO) 300 MG tablet TAKE 1 TABLET(300 MG) BY MOUTH EVERY DAY 90 tablet 3    polyethylene glycol (GLYCOLAX) 17 gram/dose powder Use cap to measure 17g, mix with liquid, and drink by mouth once daily. 510 g 0    [] ibuprofen (ADVIL,MOTRIN) 800 MG tablet Take 1 tablet (800 mg total) by mouth 3 (three) times daily. for 10 days (Patient taking differently: Take 1 tablet (800 mg total) by mouth 3 (three) times daily. for 10 days)      [DISCONTINUED] ondansetron (ZOFRAN-ODT) 4 MG TbDL DISSOLVE 1 tablet (4 mg total) by mouth every 8 (eight) hours as needed. (Patient not taking: Reported on  3/11/2025) 10 tablet 0    [DISCONTINUED] oxyCODONE (ROXICODONE) 5 MG immediate release tablet Take 1 tablet (5 mg total) by mouth every 4 (four) hours as needed for Pain. (Patient not taking: Reported on 3/11/2025) 10 tablet 0     No facility-administered encounter medications on file as of 3/11/2025.

## 2025-03-12 ENCOUNTER — RESULTS FOLLOW-UP (OUTPATIENT)
Dept: PRIMARY CARE CLINIC | Facility: CLINIC | Age: 50
End: 2025-03-12

## 2025-03-25 ENCOUNTER — OFFICE VISIT (OUTPATIENT)
Dept: UROLOGY | Facility: CLINIC | Age: 50
End: 2025-03-25
Payer: COMMERCIAL

## 2025-03-25 VITALS
HEIGHT: 71 IN | BODY MASS INDEX: 36.17 KG/M2 | HEART RATE: 79 BPM | DIASTOLIC BLOOD PRESSURE: 88 MMHG | SYSTOLIC BLOOD PRESSURE: 134 MMHG | WEIGHT: 258.38 LBS

## 2025-03-25 DIAGNOSIS — C64.1 RENAL CELL CARCINOMA OF RIGHT KIDNEY: Primary | ICD-10-CM

## 2025-03-25 PROCEDURE — 3079F DIAST BP 80-89 MM HG: CPT | Mod: CPTII,S$GLB,, | Performed by: UROLOGY

## 2025-03-25 PROCEDURE — 1159F MED LIST DOCD IN RCRD: CPT | Mod: CPTII,S$GLB,, | Performed by: UROLOGY

## 2025-03-25 PROCEDURE — 1160F RVW MEDS BY RX/DR IN RCRD: CPT | Mod: CPTII,S$GLB,, | Performed by: UROLOGY

## 2025-03-25 PROCEDURE — 3075F SYST BP GE 130 - 139MM HG: CPT | Mod: CPTII,S$GLB,, | Performed by: UROLOGY

## 2025-03-25 PROCEDURE — 4010F ACE/ARB THERAPY RXD/TAKEN: CPT | Mod: CPTII,S$GLB,, | Performed by: UROLOGY

## 2025-03-25 PROCEDURE — 99999 PR PBB SHADOW E&M-EST. PATIENT-LVL III: CPT | Mod: PBBFAC,,, | Performed by: UROLOGY

## 2025-03-25 PROCEDURE — 99024 POSTOP FOLLOW-UP VISIT: CPT | Mod: S$GLB,,, | Performed by: UROLOGY

## 2025-03-25 NOTE — PROGRESS NOTES
Patient ID: Rl Grubbs is a 49 y.o. male.    Chief Complaint: post-op s/p right partial nephrectomy      HPI: Rl Grubbs is a 49 y.o. White male who presents today for post-op evaluation and management of a right RCC.    The mass was found incidentally on imaging done for unrelated reasons.  No gross hematuria. No unexplained weight loss.     The patient denies gross hematuria and/or constitutional symptoms attributable to their recently discovered renal mass.    The patient denies a personal and family history of kidney cancer.      He is now s/p right retroperitoneal partial nephrectomy on 2/27/25. Pathology demonstrated clear cell RCC, pT1a, grade 2. All margins negative.       SURGICAL PATHOLOGY 2/27/25:  Final Pathologic Diagnosis Right kidney, partial nephrectomy:  Clear cell renal cell carcinoma, measuring 30 mm (3 cm) greatest dimension.    Inked and cauterized margins of resection are uninvolved by tumor.  See synoptic report in comment section for further details.        Comment: Synoptic report    Specimen:  Procedure: Partial nephrectomy  Specimen laterality: Right    Tumor:  Tumor focality: Unifocal    Tumor size:  Greatest dimension in cm:  3 cm    Histologic type:  Clear cell renal cell carcinoma    Histologic grade: G2; nucleoli conspicuous and visible at 400 X magnification, not prominent 100 X magnification.    Tumor extent: Limited to the kidney.    Histologic features: Sarcomatoid or rhabdoid features are not identified.    Tumor necrosis: Not identified.    Lymphatic and/or vascular invasion: Identified.      Margins:  Margin status: All margins are negative for invasive carcinoma.      Regional lymph nodes:  Regional lymph node status: Not applicable (no regional lymph nodes submitted found).    Distant metastasis: Not applicable.    Pathologic stage classification:  Modified classification: Not applicable.    Primary tumor:       pT1a:  Tumor less than or equal to 4 cm in  greatest dimension, limited to the kidney.  Regional lymph nodes:       pNX:  Not applicable no lymph nodes submitted or found).  Distant metastasis:       pMX:  Cannot be assessed.      Additional findings:  None identified in the minute amount of residual kidney tissue       Review of patient's allergies indicates:  No Known Allergies    Current Outpatient Medications   Medication Sig Dispense Refill    buPROPion (WELLBUTRIN XL) 300 MG 24 hr tablet TAKE 1 TABLET(300 MG) BY MOUTH EVERY DAY 90 tablet 3    cetirizine (ZYRTEC) 10 MG tablet Take 10 mg by mouth once daily.      esomeprazole (NEXIUM) 40 MG capsule TAKE 1 CAPSULE(40 MG) BY MOUTH EVERY DAY 90 capsule 3    fenofibrate (TRICOR) 145 MG tablet Take 1 tablet (145 mg total) by mouth once daily. 90 tablet 3    irbesartan (AVAPRO) 300 MG tablet TAKE 1 TABLET(300 MG) BY MOUTH EVERY DAY 90 tablet 3    polyethylene glycol (GLYCOLAX) 17 gram/dose powder Use cap to measure 17g, mix with liquid, and drink by mouth once daily. 510 g 0     No current facility-administered medications for this visit.       Past Medical History:   Diagnosis Date    Anxiety     GERD (gastroesophageal reflux disease)     Hyperlipidemia     Hypertension     PONV (postoperative nausea and vomiting)     Seasonal allergies 10 years    Sleep apnea 5 years       Past Surgical History:   Procedure Laterality Date    CHOLECYSTECTOMY  7-28-21    DV5 ROBOTIC NEPHRECTOMY, PARTIAL Right 2/27/2025    Procedure: DV5 ROBOTIC NEPHRECTOMY, PARTIAL;  Surgeon: Guzman Christianson MD;  Location: [address];  Service: Urology;  Laterality: Right;  retroperitoneal    HERNIA REPAIR  7-    ROBOT-ASSISTED CHOLECYSTECTOMY USING DA LORENE XI N/A 07/28/2021    Procedure: XI ROBOTIC CHOLECYSTECTOMY;  Surgeon: Brett Gastelum MD;  Location: [facility] OR [address] [place] [address];  Service: General;  Laterality: N/A;    ROBOT-ASSISTED LAPAROSCOPIC REPAIR OF EPIGASTRIC HERNIA N/A 07/28/2021    Procedure: ROBOTIC REPAIR, HERNIA,  EPIGASTRIC;  Surgeon: Brett Gastelum MD;  Location: [facility] OR [address] [place] [address];  Service: General;  Laterality: N/A;  Incarcerated visceral hernia repair         Family History   Problem Relation Name Age of Onset    Asthma Mother Mom     COPD Mother Mom     Hearing loss Mother Mom     Heart disease Mother Mom     Hypertension Mother Mom     Kidney disease Mother Mom     Allergies Mother Mom     Cancer Mother Mom     Cancer Father Mom         Lung Cancer    Lung cancer Father Mom     Hypertension Brother Austen Grubbs     Diabetes Brother Austen Grubbs     Heart disease Brother Austen Grubbs          Review of Systems  All other systems reviewed and negative except pertinent positives noted in HPI.    Objective:     Physical Exam  Constitutional:       General: He is not in acute distress.     Appearance: He is well-developed.   HENT:      Head: Normocephalic and atraumatic.   Eyes:      General: No scleral icterus.  Neck:      Trachea: No tracheal deviation.   Pulmonary:      Effort: Pulmonary effort is normal. No respiratory distress.   Abdominal:      Comments: Incisions healing well, no signs of infection     Neurological:      Mental Status: He is alert and oriented to person, place, and time.   Psychiatric:         Behavior: Behavior normal.         Thought Content: Thought content normal.         Judgment: Judgment normal.       Assessment:       1. Renal cell carcinoma of right kidney          Plan:     1. Renal cell carcinoma of right kidney          Orders Placed This Encounter   Procedures    MRI Abdomen W WO Contrast     Standing Status:   Future     Expected Date:   3/25/2026     Expiration Date:   3/25/2026     Does the patient have or ever had a pacemaker or a defibrillator (Note: Some facilities may not be able to schedule an MRI for patients with pacemakers and defibrillators. You should contact your local radiology dept to determine if this is the case.)?:   No     Does the patient have an aneurysm or surgical  clip, pump, nerve/brain stimulator, middle/inner ear prosthesis, or other metal implant or foreign object (bullet, shrapnel)? If they have a card related to their implant, ask them to bring it. Issues related to the implant may cause the MRI to be delayed.:   No     Is the patient claustrophobic?:   No     Will the patient require po anxiolysis or sedation?:   No     May the Radiologist modify the order per protocol to meet the clinical needs of the patient?:   Yes     Is this part of a Research Study?:   No     Does the patient have on a skin patch for medication with aluminized backing?:   No    X-Ray Chest PA And Lateral     Standing Status:   Future     Expected Date:   3/25/2026     Expiration Date:   3/25/2026     May the Radiologist modify the order per protocol to meet the clinical needs of the patient?:   Yes    Comprehensive Metabolic Panel     Standing Status:   Future     Expected Date:   3/25/2026     Expiration Date:   3/25/2026     1. RCC  - pathology reviewed/  We again reviewed the pathology which confirmed stage I renal cell carcinoma.  We reviewed NCCN guidelines for follow-up after a partial or radical nephrectomy for stage I renal cell carcinoma which includes:  -H&P annually  -lab tests annually, as clinically indicated  -abdominal imaging:   -baseline abdominal CT or MRI (preferred) both with and without IV contrast (unless otherwise contraindicated) within 3-12 months of surgery, then annually for up to 5 years or longer as clinically indicated  -chest imaging:   -chest x-ray or CT annually for at least 5 years, then as clinically indicated       2. HTN:  -BP reviewed  -stable, continue meds and f/u with PCP    - code applied: patient requires or will require a continuous, longitudinal, and active collaborative plan of care related to this patient's health condition, renal cell carcinoma --the management of which requires the direction of a practitioner with specialized clinical  knowledge, skill, and expertise.

## 2025-04-03 ENCOUNTER — TELEPHONE (OUTPATIENT)
Dept: PHARMACY | Facility: CLINIC | Age: 50
End: 2025-04-03
Payer: COMMERCIAL

## 2025-04-03 NOTE — TELEPHONE ENCOUNTER
[facility]/Population Health Chart Review & Patient Outreach Details For Medication Adherence Project    Reason for Outreach Encounter: 3rd Party payor non-compliance report ([employer], [employer], C, etc)  2.  Patient Outreach Method: Reviewed Patient Chart  3.   Medication in question: irbesartan   LAST FILLED: 2/3/25 for 90 day supply  Hypertension Medications              irbesartan (AVAPRO) 300 MG tablet TAKE 1 TABLET(300 MG) BY MOUTH EVERY DAY              4.  Reviewed and or Updates Made To: Patient Chart  5. Outreach Outcomes and/or actions taken: Patient filled medication and is on track to be adherent

## 2025-05-06 RX ORDER — ESOMEPRAZOLE MAGNESIUM 40 MG/1
40 CAPSULE, DELAYED RELEASE ORAL DAILY
Qty: 90 CAPSULE | Refills: 3 | Status: SHIPPED | OUTPATIENT
Start: 2025-05-06

## 2025-05-06 NOTE — TELEPHONE ENCOUNTER
No care due was identified.  [facility] Embedded Care Due Messages. Reference number: [id number].   5/06/2025 8:40:22 AM CDT

## 2025-06-11 ENCOUNTER — PATIENT MESSAGE (OUTPATIENT)
Dept: AUDIOLOGY | Facility: CLINIC | Age: 50
End: 2025-06-11

## 2025-06-11 ENCOUNTER — OFFICE VISIT (OUTPATIENT)
Dept: OTOLARYNGOLOGY | Facility: CLINIC | Age: 50
End: 2025-06-11
Payer: COMMERCIAL

## 2025-06-11 ENCOUNTER — CLINICAL SUPPORT (OUTPATIENT)
Dept: AUDIOLOGY | Facility: CLINIC | Age: 50
End: 2025-06-11
Payer: COMMERCIAL

## 2025-06-11 VITALS — SYSTOLIC BLOOD PRESSURE: 130 MMHG | DIASTOLIC BLOOD PRESSURE: 81 MMHG | HEART RATE: 82 BPM

## 2025-06-11 DIAGNOSIS — H93.13 TINNITUS, SUBJECTIVE, BILATERAL: ICD-10-CM

## 2025-06-11 DIAGNOSIS — J34.3 HYPERTROPHY, NASAL, TURBINATE: ICD-10-CM

## 2025-06-11 DIAGNOSIS — H90.3 SENSORINEURAL HEARING LOSS (SNHL) OF BOTH EARS: ICD-10-CM

## 2025-06-11 DIAGNOSIS — R09.82 POST-NASAL DRIP: ICD-10-CM

## 2025-06-11 DIAGNOSIS — H92.01 REFERRED EAR PAIN, RIGHT: Primary | ICD-10-CM

## 2025-06-11 DIAGNOSIS — K21.9 LARYNGOPHARYNGEAL REFLUX (LPR): ICD-10-CM

## 2025-06-11 DIAGNOSIS — R05.3 CHRONIC COUGH: ICD-10-CM

## 2025-06-11 DIAGNOSIS — Z77.122 EXPOSED TO NOISE: ICD-10-CM

## 2025-06-11 DIAGNOSIS — G47.33 OSA (OBSTRUCTIVE SLEEP APNEA): ICD-10-CM

## 2025-06-11 DIAGNOSIS — J34.2 NASAL SEPTAL DEVIATION: ICD-10-CM

## 2025-06-11 DIAGNOSIS — H90.3 SENSORINEURAL HEARING LOSS (SNHL), BILATERAL: Primary | ICD-10-CM

## 2025-06-11 PROCEDURE — 92557 COMPREHENSIVE HEARING TEST: CPT | Mod: S$GLB,,,

## 2025-06-11 PROCEDURE — 1160F RVW MEDS BY RX/DR IN RCRD: CPT | Mod: CPTII,S$GLB,, | Performed by: OTOLARYNGOLOGY

## 2025-06-11 PROCEDURE — 4010F ACE/ARB THERAPY RXD/TAKEN: CPT | Mod: CPTII,S$GLB,, | Performed by: OTOLARYNGOLOGY

## 2025-06-11 PROCEDURE — 99214 OFFICE O/P EST MOD 30 MIN: CPT | Mod: S$GLB,,, | Performed by: OTOLARYNGOLOGY

## 2025-06-11 PROCEDURE — 99999 PR PBB SHADOW E&M-EST. PATIENT-LVL I: CPT | Mod: PBBFAC,,,

## 2025-06-11 PROCEDURE — 1159F MED LIST DOCD IN RCRD: CPT | Mod: CPTII,S$GLB,, | Performed by: OTOLARYNGOLOGY

## 2025-06-11 PROCEDURE — 92567 TYMPANOMETRY: CPT | Mod: S$GLB,,,

## 2025-06-11 PROCEDURE — 99999 PR PBB SHADOW E&M-EST. PATIENT-LVL III: CPT | Mod: PBBFAC,,, | Performed by: OTOLARYNGOLOGY

## 2025-06-11 PROCEDURE — 3079F DIAST BP 80-89 MM HG: CPT | Mod: CPTII,S$GLB,, | Performed by: OTOLARYNGOLOGY

## 2025-06-11 PROCEDURE — 3075F SYST BP GE 130 - 139MM HG: CPT | Mod: CPTII,S$GLB,, | Performed by: OTOLARYNGOLOGY

## 2025-06-11 RX ORDER — TRIAMCINOLONE ACETONIDE 55 UG/1
2 SPRAY, METERED NASAL DAILY
Qty: 16.5 G | Refills: 5 | Status: SHIPPED | OUTPATIENT
Start: 2025-06-11

## 2025-06-11 RX ORDER — TRIAMCINOLONE ACETONIDE 55 UG/1
2 SPRAY, METERED NASAL DAILY
Qty: 16.5 G | Refills: 5 | Status: SHIPPED | OUTPATIENT
Start: 2025-06-11 | End: 2025-06-11

## 2025-06-11 NOTE — PROGRESS NOTES
"6/11/2025    Audiologic Evaluation    Rl Grubbs was seen today in the clinic for an audiologic evaluation.  Patient's main complaint was decreased hearing and tinnitus, bilaterally. Mr. Grubbs reported he feels that his hearing is worse in the left ear than in the right ear. He reported his tinnitus is similar to a "ringing" and mainly noticeable in quiet. 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 hunting) noise exposure, with intermittent use of hearing protection when in noise. Mr. Grubbs denied otalgia, aural fullness, and true vertigo.    Tympanometry revealed Type A in the right ear and Type A in the left ear.     Audiogram results revealed a mild to moderate sensorineural hearing loss (SNHL), bilaterally.      Speech reception thresholds were noted at 15 dBHL in the right ear and 10 dBHL in the left ear.    Speech discrimination scores were 100% in the right ear and 100% in the left ear.    Today's results were discussed with the patient and hearing aids were recommended bilaterally. Mr. Grubbs is interested in a hearing aid consultation and would like to utilize any insurance benefits he has for hearing aids through [employer]. I recommended that Mr. Grubbs contact his insurance provider to assess his benefits and coverage before scheduling a hearing aid consultation at [facility]. Mr. Grubbs expressed understanding of today's results and the plan.    Recommendations:  Otologic evaluation  Hearing aid consultation  Annual audiogram or sooner if change perceived  Hearing protection in noise          "

## 2025-06-11 NOTE — PATIENT INSTRUCTIONS
Reviewed history, symptoms, exam findings, and audiogram.    Discussed audio. Based on hearing levels and subjective complaints, hearing aids were recommended. He can schedule with audiology ([phone]) for discussion of hearing aid options.  In the least, I recommend another audiogram and audiologic evaluation in one year. The patient knows to call the clinic for problems ([phone]) and expectations discussed. Hearing protection devices recommended.    Nose: explained nasal septal deviation. Septoplasty and turbinate reduction may improve nasal airway and lessen CPAP pressures needed. In the least routine use of Nasacort (since Flonase causes rash) 2 sprays each nostril daily. We discussed how to apply it appropriately by placing the spray nozzle inside the nostril pointing above the ear on the same side and gently breathing in through the nose (do NOT snort).    Encouraged continued CPAP usage.    Discussed ear pain and TMJ. For TMJ inflammation - conservative treatment including soft diet, no gum chewing, avoid clenching teeth and grinding teeth, consider use of bite block and use of NSAIDS when pain occurs.    For cough - explained how post nasal drip and reflux to throat may both be factors. For reflux continue reflux medication and follow reflux precautions: Minimize caffeine, chocolate, peppermint, spicy and fatty foods, carbonated beverages and alcohol intake. Eat 3 hours before bedtime. Small meals preferable to big meals. Consider elevating head of bed. Mediterranean style diet is helpful for controlling reflux symptoms. Avoid repetitive coughing and clearing throat which may cause more throat irritation and sensitivity.    If not improving or any symptoms worsen, return to clinic.    Otherwise, annual audio recommended with follow up.

## 2025-06-11 NOTE — PROGRESS NOTES
50 y/o male presents for hearing evaluation, intermittent ear pain on right, post nasal drip, cough for 6 months and decreased nasal airway on left side. Also reports having TMJ issues.    Patient reports noticing a slow decline of his hearing over the years.  He recalls his mother started to wear hearing aids in her mid to late 50s.  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  He also is a [employer] and did not always wear hearing protection while out hunting.  He does use hearing protection when at the range.  He had a hearing test a few years ago with Dr. Virk.  He was told he had the early signs of hearing loss. No ear surgery, pain or d/c.    He reports having a cough for about 6 months.  It is not constant.  He will get a tickling sensation in his throat and feel like there is mucus there.  He then has to cough repetitively to get it up.  His wife has been concerned about him.  Sometimes he is able to get up a small amount of clear white mucus.  Other times nothing,.  He does admit to feeling some occasional postnasal drip.  He also admits admits to problems with reflux.  He admits he is not always following a diet like he should.  He does take Nexium.  He has been told to double up on it when his reflux acts up.  He is able to swallow food without difficulty.  He has not had any choking spells.  He does not feel short of breath.  He does not have a significant pulmonary history.  He does have obstructive sleep apnea and does wear CPAP, nasal mask.  He has used Flonase in the past and recalls it did help him some.  However it does give him a rash over his cheeks in his dermatologist recommended that he stop.    He also has noticed that the left ear make small wax in the right.  He does occasionally use Q-tips about once every 1 or 2 weeks.  He occasionally gets right ear pain.  It will come on without any specific movement.  Often he is  sitting still.  It only lasts for sec to a minute and will go away.  He has found a heating pad helps.  Later in the conversation he admits to having trouble with TMJ.  He had a bite block but does not use it anymore and admits he should get another.  He also admits to having problems after eating steak where the TMJ dysfunction discomfort will flare up.  He has also had trouble for when he has needed dental procedures and needed to keep his mouth open.    PMHx, PSHx, Meds, Allergies, SocHx, FamHx reviewed in EPIC    Review of Systems     Constitutional: Negative for appetite change, chills, fatigue, fever and unexpected weight loss.      HENT: Positive for ear pain, hearing loss and postnasal drip.  Negative for ear discharge, runny nose, sinus infection, sinus pressure, stuffy nose, trouble swallowing and voice change.      Eyes:  Negative for change in eyesight, eye drainage, eye itching and photophobia.     Respiratory:  Positive for cough, sleep apnea and snoring. Negative for shortness of breath and wheezing.      Cardiovascular:  Negative for chest pain, foot swelling, irregular heartbeat and swollen veins.     Gastrointestinal:  Positive for acid reflux. Negative for abdominal pain and heartburn.     Genitourinary: Negative for difficulty urinating, sexual problems and frequent urination.     Musc: Negative for aching joints, aching muscles, back pain and neck pain.     Skin: Negative for rash.     Allergy: Negative for food allergies and seasonal allergies.     Endocrine: Negative for cold intolerance and heat intolerance.      Neurological: Negative for dizziness, headaches, light-headedness, seizures and tremors.      Hematologic: Negative for bruises/bleeds easily and swollen glands.      Psychiatric: Negative for decreased concentration, depression, nervous/anxious and sleep disturbance.            PE: /81   Pulse 82    Gen: male, well nourished, well developed, NAD, cooperative, good  historian  Ears:  EAC patent & TM translucent with normal bony landmarks bilaterally  Nose: external nose wnl, nasal septum to left narrowing nasal cavity, inferior turbinates mild to mod hypertrophy, no visible purulence or polyps  OC/OP:  MMM, tongue protrudes midline, palate raises symmetrically with long uvula and long posterior pillars, tonsils 1+, no erythema or exudate, clear PND minimal w/o cobblestoning  Neck: supple, no TTP, no LAD or masses  Face:  no TTP, no erythema or flushing  Respiratory: Breathing comfortably without retractions  Skin: facial skin intact without visible lesions or flushing  Lymph: no neck lympadenopathy  Neuro:  facial movement symmetric, speech fluid, gait stable, tongue protrudes midline  Psych: alert & oriented x 3, reasonable, normal affect  Voice: clear with good volume, no stridor    Impression:   1. Referred ear pain, right        2. Sensorineural hearing loss (SNHL) of both ears        3. Exposed to noise        4. Chronic cough        5. Laryngopharyngeal reflux (LPR)        6. Nasal septal deviation  triamcinolone (NASACORT) 55 mcg nasal inhaler      7. TOBIN (obstructive sleep apnea)        8. Hypertrophy, nasal, turbinate  triamcinolone (NASACORT) 55 mcg nasal inhaler      9. Post-nasal drip  triamcinolone (NASACORT) 55 mcg nasal inhaler          Discussion and Plan:    Reviewed history, symptoms, exam findings, and audiogram.    Discussed audio. Based on hearing levels and subjective complaints, hearing aids were recommended. He can schedule with audiology ([phone]) for discussion of hearing aid options.  In the least, I recommend another audiogram and audiologic evaluation in one year. The patient knows to call the clinic for problems ([phone]) and expectations discussed. Hearing protection devices recommended.    Nose: explained nasal septal deviation. Septoplasty and turbinate reduction may improve nasal airway and lessen CPAP pressures needed. In the least  routine use of Nasacort (since Flonase causes rash) 2 sprays each nostril daily. We discussed how to apply it appropriately by placing the spray nozzle inside the nostril pointing above the ear on the same side and gently breathing in through the nose (do NOT snort).    Encouraged continued CPAP usage.    Discussed ear pain and TMJ. For TMJ inflammation - conservative treatment including soft diet, no gum chewing, avoid clenching teeth and grinding teeth, consider use of bite block and use of NSAIDS when pain occurs.    For cough - explained how post nasal drip and reflux to throat may both be factors. For reflux continue reflux medication and follow reflux precautions: Minimize caffeine, chocolate, peppermint, spicy and fatty foods, carbonated beverages and alcohol intake. Eat 3 hours before bedtime. Small meals preferable to big meals. Consider elevating head of bed. Mediterranean style diet is helpful for controlling reflux symptoms. Avoid repetitive coughing and clearing throat which may cause more throat irritation and sensitivity.    If not improving or any symptoms worsen, return to clinic.    Otherwise, annual audio recommended with follow up.         Parts or all of this note were created by voice recognition software; typographical errors in translating may be present.

## 2025-07-15 ENCOUNTER — OFFICE VISIT (OUTPATIENT)
Dept: SURGERY | Facility: CLINIC | Age: 50
End: 2025-07-15
Payer: COMMERCIAL

## 2025-07-15 VITALS
SYSTOLIC BLOOD PRESSURE: 115 MMHG | HEART RATE: 89 BPM | OXYGEN SATURATION: 98 % | DIASTOLIC BLOOD PRESSURE: 84 MMHG | HEIGHT: 71 IN | BODY MASS INDEX: 36.6 KG/M2 | WEIGHT: 261.44 LBS

## 2025-07-15 DIAGNOSIS — K43.2 RECURRENT INCISIONAL HERNIA: Primary | ICD-10-CM

## 2025-07-15 PROCEDURE — 4010F ACE/ARB THERAPY RXD/TAKEN: CPT | Mod: CPTII,S$GLB,, | Performed by: SURGERY

## 2025-07-15 PROCEDURE — 99213 OFFICE O/P EST LOW 20 MIN: CPT | Mod: S$GLB,,, | Performed by: SURGERY

## 2025-07-15 PROCEDURE — 1159F MED LIST DOCD IN RCRD: CPT | Mod: CPTII,S$GLB,, | Performed by: SURGERY

## 2025-07-15 PROCEDURE — 3079F DIAST BP 80-89 MM HG: CPT | Mod: CPTII,S$GLB,, | Performed by: SURGERY

## 2025-07-15 PROCEDURE — 99999 PR PBB SHADOW E&M-EST. PATIENT-LVL III: CPT | Mod: PBBFAC,,, | Performed by: SURGERY

## 2025-07-15 PROCEDURE — 3008F BODY MASS INDEX DOCD: CPT | Mod: CPTII,S$GLB,, | Performed by: SURGERY

## 2025-07-15 PROCEDURE — 3074F SYST BP LT 130 MM HG: CPT | Mod: CPTII,S$GLB,, | Performed by: SURGERY

## 2025-07-15 NOTE — PROGRESS NOTES
General Surgery Office Visit   History and Physical    Patient Name: Rl Grubbs  YOB: 1975 (49 y.o.)  MRN: [id number]  Today's Date: 07/15/2025    Referring Md:   No referring provider defined for this encounter.    SUBJECTIVE:     Chief Complaint: Abdominal pain    History of Present Illness:  Rl Grubbs is a 49 y.o. male with PMHx of HTN, depression, HLD, GERD, TOBIN who returns to the clinic today for evaluation of recurrent incisional hernia and diastasis.  Was found to have a kidney mass during our last visit and was seen and had a partial nephrectomy with Dr. Christianson.  He has recovered well from that surgery and would like to discuss repair of his diastasis and incisional hernias. Prior abdominal history includes robotic cholecystectomy and epigastric hernia repair with Phasix ST mesh in 2021. He denies fever, chills, nausea, vomiting, any obstructive symptoms.  He returns to clinic today following CT scan which redemonstrated in incisional hernia and is here to discuss surgical options.  Imaging also showed an indeterminate renal mass.    Patient denies personal history of MI, CVA, lung disease, DM  Social ETOH use. Denies tobacco and elicit drug use.   Not currently on any anticoagulants      Review of patient's allergies indicates:  No Known Allergies    Past Medical History:   Diagnosis Date    Anxiety     GERD (gastroesophageal reflux disease)     Hyperlipidemia     Hypertension     PONV (postoperative nausea and vomiting)     Seasonal allergies 10 years    Sleep apnea 5 years     Past Surgical History:   Procedure Laterality Date    CHOLECYSTECTOMY  7-28-21    DV5 ROBOTIC NEPHRECTOMY, PARTIAL Right 2/27/2025    Procedure: DV5 ROBOTIC NEPHRECTOMY, PARTIAL;  Surgeon: Guzman Christianson MD;  Location: [facility] OR [address] [place] [address];  Service: Urology;  Laterality: Right;  retroperitoneal    HERNIA REPAIR  7-    ROBOT-ASSISTED CHOLECYSTECTOMY USING DA LORENE XI N/A 07/28/2021    Procedure: XI  ROBOTIC CHOLECYSTECTOMY;  Surgeon: Brett Gastelum MD;  Location: [facility] OR [address] [place] [address];  Service: General;  Laterality: N/A;    ROBOT-ASSISTED LAPAROSCOPIC REPAIR OF EPIGASTRIC HERNIA N/A 07/28/2021    Procedure: ROBOTIC REPAIR, HERNIA, EPIGASTRIC;  Surgeon: Brett Gastelum MD;  Location: [facility] OR [address] [place] [address];  Service: General;  Laterality: N/A;  Incarcerated visceral hernia repair       Family History   Problem Relation Name Age of Onset    Asthma Mother Mom     COPD Mother Mom     Hearing loss Mother Mom     Heart disease Mother Mom     Hypertension Mother Mom     Kidney disease Mother Mom     Allergies Mother Mom     Cancer Mother Mom     Cancer Father Mom         Lung Cancer    Lung cancer Father Mom     Hypertension Brother Austen Grubbs     Diabetes Brother Austen Grubbs     Heart disease Brother Austen Grubbs      Social History     Tobacco Use    Smoking status: Never     Passive exposure: Never    Smokeless tobacco: Never   Substance Use Topics    Alcohol use: Yes     Alcohol/week: 6.0 standard drinks of alcohol     Types: 6 Cans of beer per week     Comment: Socially    Drug use: Never        Review of Systems:  Review of Systems   Constitutional:  Negative for chills and fever.   Respiratory:  Negative for cough and shortness of breath.    Cardiovascular:  Negative for chest pain and palpitations.   Gastrointestinal:  Positive for abdominal pain. Negative for diarrhea, nausea and vomiting.        (+) abdominal bulge    Genitourinary:  Negative for dysuria and hematuria.   Musculoskeletal:  Negative for falls and myalgias.   Skin:  Negative for itching and rash.   Neurological:  Negative for dizziness and headaches.   Endo/Heme/Allergies:  Negative for environmental allergies. Does not bruise/bleed easily.   Psychiatric/Behavioral:  Negative for substance abuse. The patient is not nervous/anxious.    All other systems reviewed and are negative.      OBJECTIVE:     Vital Signs (Most Recent)  /84 (BP Location:  "Left arm, Patient Position: Sitting)   Pulse 89   Ht 5' 11" (1.803 m)   Wt 118.6 kg (261 lb 7.5 oz)   SpO2 98%   BMI 36.47 kg/m²     Physical Exam  Vitals and nursing note reviewed.   Constitutional:       General: He is not in acute distress.     Appearance: Normal appearance. He is not ill-appearing or diaphoretic.      Comments: Room air   HENT:      Head: Normocephalic and atraumatic.      Mouth/Throat:      Mouth: Mucous membranes are moist.      Pharynx: Oropharynx is clear.   Eyes:      General: No scleral icterus.     Extraocular Movements: Extraocular movements intact.      Conjunctiva/sclera: Conjunctivae normal.      Pupils: Pupils are equal, round, and reactive to light.   Neck:      Trachea: No tracheal deviation.   Cardiovascular:      Rate and Rhythm: Normal rate and regular rhythm.   Pulmonary:      Effort: Pulmonary effort is normal. No respiratory distress.      Breath sounds: No stridor.   Abdominal:      General: There is no distension.      Palpations: Abdomen is soft.      Tenderness: There is no abdominal tenderness. There is no guarding or rebound.      Hernia: A hernia is present.      Comments: Soft, reducible ventral hernia at midline above umbilicus, measuring 4-6 cm wide. Not TTP. No overlying skin changes. No peritonitic signs   Soft reducible umbilical hernia also present   Musculoskeletal:         General: No deformity or signs of injury.      Cervical back: No edema or erythema.   Skin:     General: Skin is warm and dry.      Coloration: Skin is not jaundiced.   Neurological:      General: No focal deficit present.      Mental Status: He is alert and oriented to person, place, and time.   Psychiatric:         Mood and Affect: Mood normal.         Behavior: Behavior normal.         ASSESSMENT/PLAN:     Rl Grubbs is a 47 y.o. male with PMHx of HTN, depression, HLD, GERD, TOBIN who presents to the clinic today complaining of abdominal discomfort and bulge concerning for " ventral hernia. Clinically stable without signs of incarceration/strangulation/obstruction, interested in repair.    -Plan for robotic eTEP with repair of diastasis and recurrent incisional hernia on Aug 27    Brett Gastelum MD  Acute Care Surgery  [facility]

## 2025-08-06 ENCOUNTER — ANESTHESIA EVENT (OUTPATIENT)
Dept: SURGERY | Facility: HOSPITAL | Age: 50
End: 2025-08-06
Payer: COMMERCIAL

## 2025-08-06 DIAGNOSIS — F33.0 DEPRESSION, MAJOR, RECURRENT, MILD: ICD-10-CM

## 2025-08-06 RX ORDER — BUPROPION HYDROCHLORIDE 300 MG/1
300 TABLET ORAL
Qty: 90 TABLET | Refills: 2 | Status: SHIPPED | OUTPATIENT
Start: 2025-08-06

## 2025-08-06 NOTE — TELEPHONE ENCOUNTER
Provider Staff:  Action required for this patient    Requires labs      Please see care gap opportunities below in Care Due Message.    Thanks!  [facility]     Appointments      Date Provider   Last Visit   3/11/2025 Tomer Roberto MD   Next Visit   9/11/2025 Tomer Roberto MD     Refill Decision Note   Rl Grubbs  is requesting a refill authorization.  Brief Assessment and Rationale for Refill:  Approve     Medication Therapy Plan:  FOVS      Comments:     Note composed:9:05 AM 08/06/2025

## 2025-08-26 ENCOUNTER — TELEPHONE (OUTPATIENT)
Dept: SURGERY | Facility: CLINIC | Age: 50
End: 2025-08-26
Payer: COMMERCIAL

## 2025-08-27 ENCOUNTER — HOSPITAL ENCOUNTER (OUTPATIENT)
Facility: HOSPITAL | Age: 50
Discharge: HOME | End: 2025-08-28
Attending: SURGERY | Admitting: SURGERY
Payer: COMMERCIAL

## 2025-08-27 ENCOUNTER — ANESTHESIA (OUTPATIENT)
Dept: SURGERY | Facility: HOSPITAL | Age: 50
End: 2025-08-27
Payer: COMMERCIAL

## 2025-08-27 DIAGNOSIS — K43.2 RECURRENT INCISIONAL HERNIA: ICD-10-CM

## 2025-08-27 PROCEDURE — 71000015 HC POSTOP RECOV 1ST HR: Performed by: SURGERY

## 2025-08-27 PROCEDURE — 63600175 PHARM REV CODE 636 W HCPCS: Performed by: SURGERY

## 2025-08-27 PROCEDURE — 25000003 PHARM REV CODE 250

## 2025-08-27 PROCEDURE — 49618 RPR AA HRN RCR > 10 NCR/STRN: CPT | Mod: ,,, | Performed by: SURGERY

## 2025-08-27 PROCEDURE — 96372 THER/PROPH/DIAG INJ SC/IM: CPT | Performed by: STUDENT IN AN ORGANIZED HEALTH CARE EDUCATION/TRAINING PROGRAM

## 2025-08-27 PROCEDURE — 63600175 PHARM REV CODE 636 W HCPCS

## 2025-08-27 PROCEDURE — 25000003 PHARM REV CODE 250: Performed by: SURGERY

## 2025-08-27 PROCEDURE — 37000009 HC ANESTHESIA EA ADD 15 MINS: Performed by: SURGERY

## 2025-08-27 PROCEDURE — 71000016 HC POSTOP RECOV ADDL HR: Performed by: SURGERY

## 2025-08-27 PROCEDURE — 71000044 HC DOSC ROUTINE RECOVERY FIRST HOUR: Performed by: SURGERY

## 2025-08-27 PROCEDURE — 63600175 PHARM REV CODE 636 W HCPCS: Performed by: STUDENT IN AN ORGANIZED HEALTH CARE EDUCATION/TRAINING PROGRAM

## 2025-08-27 PROCEDURE — 25000003 PHARM REV CODE 250: Performed by: NURSE ANESTHETIST, CERTIFIED REGISTERED

## 2025-08-27 PROCEDURE — 25000003 PHARM REV CODE 250: Performed by: STUDENT IN AN ORGANIZED HEALTH CARE EDUCATION/TRAINING PROGRAM

## 2025-08-27 PROCEDURE — 25000242 PHARM REV CODE 250 ALT 637 W/ HCPCS

## 2025-08-27 PROCEDURE — 36000710: Performed by: SURGERY

## 2025-08-27 PROCEDURE — 94761 N-INVAS EAR/PLS OXIMETRY MLT: CPT

## 2025-08-27 PROCEDURE — 37000008 HC ANESTHESIA 1ST 15 MINUTES: Performed by: SURGERY

## 2025-08-27 PROCEDURE — 36000711: Performed by: SURGERY

## 2025-08-27 PROCEDURE — C1781 MESH (IMPLANTABLE): HCPCS | Performed by: SURGERY

## 2025-08-27 DEVICE — IMPLANTABLE DEVICE: Type: IMPLANTABLE DEVICE | Site: ABDOMEN | Status: FUNCTIONAL

## 2025-08-27 RX ORDER — KETOROLAC TROMETHAMINE 15 MG/ML
15 INJECTION, SOLUTION INTRAMUSCULAR; INTRAVENOUS EVERY 6 HOURS PRN
Status: DISCONTINUED | OUTPATIENT
Start: 2025-08-27 | End: 2025-08-28 | Stop reason: HOSPADM

## 2025-08-27 RX ORDER — ROCURONIUM BROMIDE 10 MG/ML
INJECTION, SOLUTION INTRAVENOUS
Status: DISCONTINUED | OUTPATIENT
Start: 2025-08-27 | End: 2025-08-27

## 2025-08-27 RX ORDER — KETAMINE HCL IN 0.9 % NACL 50 MG/5 ML
SYRINGE (ML) INTRAVENOUS
Status: DISCONTINUED | OUTPATIENT
Start: 2025-08-27 | End: 2025-08-27

## 2025-08-27 RX ORDER — ONDANSETRON HYDROCHLORIDE 2 MG/ML
INJECTION, SOLUTION INTRAVENOUS
Status: DISCONTINUED | OUTPATIENT
Start: 2025-08-27 | End: 2025-08-27

## 2025-08-27 RX ORDER — SODIUM CHLORIDE 0.9 % (FLUSH) 0.9 %
10 SYRINGE (ML) INJECTION
Status: DISCONTINUED | OUTPATIENT
Start: 2025-08-27 | End: 2025-08-28 | Stop reason: HOSPADM

## 2025-08-27 RX ORDER — FENTANYL CITRATE 50 UG/ML
25 INJECTION, SOLUTION INTRAMUSCULAR; INTRAVENOUS EVERY 5 MIN PRN
Status: COMPLETED | OUTPATIENT
Start: 2025-08-27 | End: 2025-08-27

## 2025-08-27 RX ORDER — ONDANSETRON HYDROCHLORIDE 2 MG/ML
4 INJECTION, SOLUTION INTRAVENOUS DAILY PRN
Status: DISCONTINUED | OUTPATIENT
Start: 2025-08-27 | End: 2025-08-27 | Stop reason: HOSPADM

## 2025-08-27 RX ORDER — SODIUM CHLORIDE 9 MG/ML
INJECTION, SOLUTION INTRAVENOUS CONTINUOUS
Status: DISCONTINUED | OUTPATIENT
Start: 2025-08-27 | End: 2025-08-28 | Stop reason: HOSPADM

## 2025-08-27 RX ORDER — METHOCARBAMOL 500 MG/1
500 TABLET, FILM COATED ORAL 4 TIMES DAILY
Qty: 40 TABLET | Refills: 0 | Status: SHIPPED | OUTPATIENT
Start: 2025-08-27 | End: 2025-09-07

## 2025-08-27 RX ORDER — OXYCODONE HYDROCHLORIDE 5 MG/1
5 TABLET ORAL EVERY 4 HOURS PRN
Refills: 0 | Status: DISCONTINUED | OUTPATIENT
Start: 2025-08-27 | End: 2025-08-27

## 2025-08-27 RX ORDER — HYDROMORPHONE HYDROCHLORIDE 1 MG/ML
0.5 INJECTION, SOLUTION INTRAMUSCULAR; INTRAVENOUS; SUBCUTANEOUS ONCE
Status: COMPLETED | OUTPATIENT
Start: 2025-08-27 | End: 2025-08-27

## 2025-08-27 RX ORDER — PROPOFOL 10 MG/ML
VIAL (ML) INTRAVENOUS
Status: DISCONTINUED | OUTPATIENT
Start: 2025-08-27 | End: 2025-08-27

## 2025-08-27 RX ORDER — SODIUM CHLORIDE 0.9 % (FLUSH) 0.9 %
10 SYRINGE (ML) INJECTION
Status: DISCONTINUED | OUTPATIENT
Start: 2025-08-27 | End: 2025-08-27 | Stop reason: HOSPADM

## 2025-08-27 RX ORDER — ACETAMINOPHEN 10 MG/ML
INJECTION, SOLUTION INTRAVENOUS
Status: DISCONTINUED | OUTPATIENT
Start: 2025-08-27 | End: 2025-08-27

## 2025-08-27 RX ORDER — DEXAMETHASONE SODIUM PHOSPHATE 4 MG/ML
INJECTION, SOLUTION INTRA-ARTICULAR; INTRALESIONAL; INTRAMUSCULAR; INTRAVENOUS; SOFT TISSUE
Status: DISCONTINUED | OUTPATIENT
Start: 2025-08-27 | End: 2025-08-27

## 2025-08-27 RX ORDER — GLUCAGON 1 MG
1 KIT INJECTION
Status: DISCONTINUED | OUTPATIENT
Start: 2025-08-27 | End: 2025-08-27 | Stop reason: HOSPADM

## 2025-08-27 RX ORDER — TALC
6 POWDER (GRAM) TOPICAL NIGHTLY PRN
Status: DISCONTINUED | OUTPATIENT
Start: 2025-08-27 | End: 2025-08-28 | Stop reason: HOSPADM

## 2025-08-27 RX ORDER — ACETAMINOPHEN 500 MG
1000 TABLET ORAL EVERY 6 HOURS
Status: DISCONTINUED | OUTPATIENT
Start: 2025-08-27 | End: 2025-08-28 | Stop reason: HOSPADM

## 2025-08-27 RX ORDER — SCOPOLAMINE 1 MG/3D
1 PATCH, EXTENDED RELEASE TRANSDERMAL
Status: DISCONTINUED | OUTPATIENT
Start: 2025-08-27 | End: 2025-08-28 | Stop reason: HOSPADM

## 2025-08-27 RX ORDER — ALBUTEROL SULFATE 90 UG/1
INHALANT RESPIRATORY (INHALATION)
Status: DISCONTINUED | OUTPATIENT
Start: 2025-08-27 | End: 2025-08-27

## 2025-08-27 RX ORDER — CEFAZOLIN 2 G/1
INJECTION, POWDER, FOR SOLUTION INTRAMUSCULAR; INTRAVENOUS
Status: DISCONTINUED | OUTPATIENT
Start: 2025-08-27 | End: 2025-08-27

## 2025-08-27 RX ORDER — LIDOCAINE HYDROCHLORIDE 20 MG/ML
INJECTION, SOLUTION EPIDURAL; INFILTRATION; INTRACAUDAL; PERINEURAL
Status: DISCONTINUED | OUTPATIENT
Start: 2025-08-27 | End: 2025-08-27

## 2025-08-27 RX ORDER — BUPIVACAINE HYDROCHLORIDE 2.5 MG/ML
INJECTION, SOLUTION EPIDURAL; INFILTRATION; INTRACAUDAL; PERINEURAL
Status: DISCONTINUED | OUTPATIENT
Start: 2025-08-27 | End: 2025-08-27 | Stop reason: HOSPADM

## 2025-08-27 RX ORDER — OXYCODONE HYDROCHLORIDE 5 MG/1
5 TABLET ORAL ONCE
Refills: 0 | Status: COMPLETED | OUTPATIENT
Start: 2025-08-27 | End: 2025-08-27

## 2025-08-27 RX ORDER — PROCHLORPERAZINE EDISYLATE 5 MG/ML
5 INJECTION INTRAMUSCULAR; INTRAVENOUS EVERY 6 HOURS PRN
Status: DISCONTINUED | OUTPATIENT
Start: 2025-08-27 | End: 2025-08-28 | Stop reason: HOSPADM

## 2025-08-27 RX ORDER — PROCHLORPERAZINE EDISYLATE 5 MG/ML
5 INJECTION INTRAMUSCULAR; INTRAVENOUS EVERY 30 MIN PRN
Status: DISCONTINUED | OUTPATIENT
Start: 2025-08-27 | End: 2025-08-27 | Stop reason: HOSPADM

## 2025-08-27 RX ORDER — VECURONIUM BROMIDE 1 MG/ML
INJECTION, POWDER, LYOPHILIZED, FOR SOLUTION INTRAVENOUS
Status: DISCONTINUED | OUTPATIENT
Start: 2025-08-27 | End: 2025-08-27

## 2025-08-27 RX ORDER — ONDANSETRON 8 MG/1
8 TABLET, ORALLY DISINTEGRATING ORAL EVERY 8 HOURS PRN
Status: DISCONTINUED | OUTPATIENT
Start: 2025-08-27 | End: 2025-08-28 | Stop reason: HOSPADM

## 2025-08-27 RX ORDER — FENTANYL CITRATE 50 UG/ML
INJECTION, SOLUTION INTRAMUSCULAR; INTRAVENOUS
Status: DISCONTINUED | OUTPATIENT
Start: 2025-08-27 | End: 2025-08-27

## 2025-08-27 RX ORDER — OXYCODONE HYDROCHLORIDE 5 MG/1
5 TABLET ORAL EVERY 6 HOURS PRN
Qty: 20 TABLET | Refills: 0 | Status: SHIPPED | OUTPATIENT
Start: 2025-08-27

## 2025-08-27 RX ORDER — ACETAMINOPHEN 500 MG
1000 TABLET ORAL EVERY 8 HOURS
COMMUNITY
Start: 2025-08-27

## 2025-08-27 RX ORDER — OXYCODONE HYDROCHLORIDE 10 MG/1
10 TABLET ORAL EVERY 6 HOURS PRN
Refills: 0 | Status: DISCONTINUED | OUTPATIENT
Start: 2025-08-27 | End: 2025-08-28 | Stop reason: HOSPADM

## 2025-08-27 RX ORDER — METHOCARBAMOL 100 MG/ML
1000 INJECTION, SOLUTION INTRAMUSCULAR; INTRAVENOUS EVERY 8 HOURS
Status: DISCONTINUED | OUTPATIENT
Start: 2025-08-27 | End: 2025-08-28 | Stop reason: HOSPADM

## 2025-08-27 RX ORDER — DEXMEDETOMIDINE HYDROCHLORIDE 100 UG/ML
INJECTION, SOLUTION INTRAVENOUS
Status: DISCONTINUED | OUTPATIENT
Start: 2025-08-27 | End: 2025-08-27

## 2025-08-27 RX ORDER — MIDAZOLAM HYDROCHLORIDE 1 MG/ML
INJECTION, SOLUTION INTRAMUSCULAR; INTRAVENOUS
Status: DISCONTINUED | OUTPATIENT
Start: 2025-08-27 | End: 2025-08-27

## 2025-08-27 RX ORDER — CEFAZOLIN 2 G/1
2 INJECTION, POWDER, FOR SOLUTION INTRAMUSCULAR; INTRAVENOUS
Status: DISCONTINUED | OUTPATIENT
Start: 2025-08-27 | End: 2025-08-27 | Stop reason: HOSPADM

## 2025-08-27 RX ORDER — HEPARIN SODIUM 5000 [USP'U]/ML
5000 INJECTION, SOLUTION INTRAVENOUS; SUBCUTANEOUS EVERY 8 HOURS
Status: DISCONTINUED | OUTPATIENT
Start: 2025-08-27 | End: 2025-08-27 | Stop reason: HOSPADM

## 2025-08-27 RX ADMIN — HEPARIN SODIUM 5000 UNITS: 5000 INJECTION INTRAVENOUS; SUBCUTANEOUS at 10:08

## 2025-08-27 RX ADMIN — ROCURONIUM BROMIDE 20 MG: 10 INJECTION, SOLUTION INTRAVENOUS at 03:08

## 2025-08-27 RX ADMIN — ACETAMINOPHEN 1000 MG: 500 TABLET ORAL at 08:08

## 2025-08-27 RX ADMIN — VECURONIUM BROMIDE 2 MG: 10 INJECTION, POWDER, LYOPHILIZED, FOR SOLUTION INTRAVENOUS at 04:08

## 2025-08-27 RX ADMIN — HEPARIN SODIUM 5000 UNITS: 5000 INJECTION INTRAVENOUS; SUBCUTANEOUS at 09:08

## 2025-08-27 RX ADMIN — CEFAZOLIN 2 G: 2 INJECTION, POWDER, FOR SOLUTION INTRAMUSCULAR; INTRAVENOUS at 06:08

## 2025-08-27 RX ADMIN — FENTANYL CITRATE 100 MCG: 50 INJECTION, SOLUTION INTRAMUSCULAR; INTRAVENOUS at 03:08

## 2025-08-27 RX ADMIN — ACETAMINOPHEN 1000 MG: 10 INJECTION, SOLUTION INTRAVENOUS at 04:08

## 2025-08-27 RX ADMIN — KETOROLAC TROMETHAMINE 15 MG: 15 INJECTION, SOLUTION INTRAMUSCULAR; INTRAVENOUS at 08:08

## 2025-08-27 RX ADMIN — SODIUM CHLORIDE: 0.9 INJECTION, SOLUTION INTRAVENOUS at 01:08

## 2025-08-27 RX ADMIN — OXYCODONE HYDROCHLORIDE 10 MG: 10 TABLET ORAL at 11:08

## 2025-08-27 RX ADMIN — OXYCODONE HYDROCHLORIDE 5 MG: 5 TABLET ORAL at 06:08

## 2025-08-27 RX ADMIN — SCOPOLAMINE 1 PATCH: 1.5 PATCH, EXTENDED RELEASE TRANSDERMAL at 10:08

## 2025-08-27 RX ADMIN — ROCURONIUM BROMIDE 30 MG: 10 INJECTION, SOLUTION INTRAVENOUS at 02:08

## 2025-08-27 RX ADMIN — FENTANYL CITRATE 100 MCG: 50 INJECTION, SOLUTION INTRAMUSCULAR; INTRAVENOUS at 01:08

## 2025-08-27 RX ADMIN — SODIUM CHLORIDE: 9 INJECTION, SOLUTION INTRAVENOUS at 10:08

## 2025-08-27 RX ADMIN — FENTANYL CITRATE 25 MCG: 50 INJECTION INTRAMUSCULAR; INTRAVENOUS at 06:08

## 2025-08-27 RX ADMIN — Medication 10 MG: at 03:08

## 2025-08-27 RX ADMIN — SUGAMMADEX 400 MG: 100 INJECTION, SOLUTION INTRAVENOUS at 06:08

## 2025-08-27 RX ADMIN — Medication 10 MG: at 04:08

## 2025-08-27 RX ADMIN — CEFAZOLIN 2 G: 2 INJECTION, POWDER, FOR SOLUTION INTRAMUSCULAR; INTRAVENOUS at 02:08

## 2025-08-27 RX ADMIN — HYDROMORPHONE HYDROCHLORIDE 0.5 MG: 1 INJECTION, SOLUTION INTRAMUSCULAR; INTRAVENOUS; SUBCUTANEOUS at 09:08

## 2025-08-27 RX ADMIN — ALBUTEROL SULFATE 10 PUFF: 90 AEROSOL, METERED RESPIRATORY (INHALATION) at 03:08

## 2025-08-27 RX ADMIN — Medication 20 MG: at 02:08

## 2025-08-27 RX ADMIN — ONDANSETRON 4 MG: 2 INJECTION INTRAMUSCULAR; INTRAVENOUS at 05:08

## 2025-08-27 RX ADMIN — FENTANYL CITRATE 25 MCG: 50 INJECTION INTRAMUSCULAR; INTRAVENOUS at 07:08

## 2025-08-27 RX ADMIN — DEXAMETHASONE SODIUM PHOSPHATE 8 MG: 4 INJECTION, SOLUTION INTRAMUSCULAR; INTRAVENOUS at 02:08

## 2025-08-27 RX ADMIN — ROCURONIUM BROMIDE 20 MG: 10 INJECTION, SOLUTION INTRAVENOUS at 02:08

## 2025-08-27 RX ADMIN — MIDAZOLAM HYDROCHLORIDE 2 MG: 2 INJECTION, SOLUTION INTRAMUSCULAR; INTRAVENOUS at 01:08

## 2025-08-27 RX ADMIN — DEXMEDETOMIDINE 12 MCG: 100 INJECTION, SOLUTION, CONCENTRATE INTRAVENOUS at 02:08

## 2025-08-27 RX ADMIN — METHOCARBAMOL 1000 MG: 100 INJECTION INTRAMUSCULAR; INTRAVENOUS at 09:08

## 2025-08-27 RX ADMIN — VECURONIUM BROMIDE 2 MG: 10 INJECTION, POWDER, LYOPHILIZED, FOR SOLUTION INTRAVENOUS at 05:08

## 2025-08-27 RX ADMIN — ROCURONIUM BROMIDE 70 MG: 10 INJECTION, SOLUTION INTRAVENOUS at 01:08

## 2025-08-27 RX ADMIN — PROPOFOL 50 MG: 10 INJECTION, EMULSION INTRAVENOUS at 03:08

## 2025-08-27 RX ADMIN — PROPOFOL 200 MG: 10 INJECTION, EMULSION INTRAVENOUS at 01:08

## 2025-08-27 RX ADMIN — LIDOCAINE HYDROCHLORIDE 100 MG: 20 INJECTION, SOLUTION EPIDURAL; INFILTRATION; INTRACAUDAL; PERINEURAL at 01:08

## 2025-08-28 LAB
ABSOLUTE EOSINOPHIL (OHS): 0.01 K/UL
ABSOLUTE MONOCYTE (OHS): 0.78 K/UL (ref 0.3–1)
ABSOLUTE NEUTROPHIL COUNT (OHS): 6.82 K/UL (ref 1.8–7.7)
ALBUMIN SERPL BCP-MCNC: 3.8 G/DL (ref 3.5–5.2)
ALP SERPL-CCNC: 44 UNIT/L (ref 40–150)
ALT SERPL W/O P-5'-P-CCNC: 17 UNIT/L (ref 0–55)
ANION GAP (OHS): 11 MMOL/L (ref 8–16)
AST SERPL-CCNC: 18 UNIT/L (ref 0–50)
BASOPHILS # BLD AUTO: 0.01 K/UL
BASOPHILS NFR BLD AUTO: 0.1 %
BILIRUB SERPL-MCNC: 0.5 MG/DL (ref 0.1–1)
BUN SERPL-MCNC: 17 MG/DL (ref 6–20)
CALCIUM SERPL-MCNC: 8.4 MG/DL (ref 8.7–10.5)
CHLORIDE SERPL-SCNC: 101 MMOL/L (ref 95–110)
CO2 SERPL-SCNC: 23 MMOL/L (ref 23–29)
CREAT SERPL-MCNC: 1.4 MG/DL (ref 0.5–1.4)
ERYTHROCYTE [DISTWIDTH] IN BLOOD BY AUTOMATED COUNT: 14.2 % (ref 11.5–14.5)
GFR SERPLBLD CREATININE-BSD FMLA CKD-EPI: >60 ML/MIN/1.73/M2
GLUCOSE SERPL-MCNC: 132 MG/DL (ref 70–110)
HCT VFR BLD AUTO: 39.7 % (ref 40–54)
HGB BLD-MCNC: 12.8 GM/DL (ref 14–18)
IMM GRANULOCYTES # BLD AUTO: 0.05 K/UL (ref 0–0.04)
IMM GRANULOCYTES NFR BLD AUTO: 0.5 % (ref 0–0.5)
LYMPHOCYTES # BLD AUTO: 1.74 K/UL (ref 1–4.8)
MAGNESIUM SERPL-MCNC: 1.8 MG/DL (ref 1.6–2.6)
MCH RBC QN AUTO: 27.4 PG (ref 27–31)
MCHC RBC AUTO-ENTMCNC: 32.2 G/DL (ref 32–36)
MCV RBC AUTO: 85 FL (ref 82–98)
NUCLEATED RBC (/100WBC) (OHS): 0 /100 WBC
PHOSPHATE SERPL-MCNC: 3.6 MG/DL (ref 2.7–4.5)
PLATELET # BLD AUTO: 298 K/UL (ref 150–450)
PMV BLD AUTO: 8.9 FL (ref 9.2–12.9)
POTASSIUM SERPL-SCNC: 4.8 MMOL/L (ref 3.5–5.1)
PROT SERPL-MCNC: 6.9 GM/DL (ref 6–8.4)
RBC # BLD AUTO: 4.67 M/UL (ref 4.6–6.2)
RELATIVE EOSINOPHIL (OHS): 0.1 %
RELATIVE LYMPHOCYTE (OHS): 18.5 % (ref 18–48)
RELATIVE MONOCYTE (OHS): 8.3 % (ref 4–15)
RELATIVE NEUTROPHIL (OHS): 72.5 % (ref 38–73)
SODIUM SERPL-SCNC: 135 MMOL/L (ref 136–145)
WBC # BLD AUTO: 9.41 K/UL (ref 3.9–12.7)

## 2025-08-28 PROCEDURE — 99900035 HC TECH TIME PER 15 MIN (STAT)

## 2025-08-28 PROCEDURE — 84100 ASSAY OF PHOSPHORUS: CPT

## 2025-08-28 PROCEDURE — 27000221 HC OXYGEN, UP TO 24 HOURS

## 2025-08-28 PROCEDURE — 85025 COMPLETE CBC W/AUTO DIFF WBC: CPT

## 2025-08-28 PROCEDURE — 80053 COMPREHEN METABOLIC PANEL: CPT

## 2025-08-28 PROCEDURE — 27100171 HC OXYGEN HIGH FLOW UP TO 24 HOURS

## 2025-08-28 PROCEDURE — 63600175 PHARM REV CODE 636 W HCPCS

## 2025-08-28 PROCEDURE — 27000190 HC CPAP FULL FACE MASK W/VALVE

## 2025-08-28 PROCEDURE — 94660 CPAP INITIATION&MGMT: CPT

## 2025-08-28 PROCEDURE — G0378 HOSPITAL OBSERVATION PER HR: HCPCS

## 2025-08-28 PROCEDURE — 83735 ASSAY OF MAGNESIUM: CPT

## 2025-08-28 PROCEDURE — 25000003 PHARM REV CODE 250

## 2025-08-28 PROCEDURE — 36415 COLL VENOUS BLD VENIPUNCTURE: CPT

## 2025-08-28 RX ORDER — ONDANSETRON 8 MG/1
8 TABLET, ORALLY DISINTEGRATING ORAL EVERY 8 HOURS PRN
Qty: 15 TABLET | Refills: 0 | Status: SHIPPED | OUTPATIENT
Start: 2025-08-28

## 2025-08-28 RX ADMIN — OXYCODONE HYDROCHLORIDE 10 MG: 10 TABLET ORAL at 11:08

## 2025-08-28 RX ADMIN — ACETAMINOPHEN 1000 MG: 500 TABLET ORAL at 05:08

## 2025-08-28 RX ADMIN — ACETAMINOPHEN 1000 MG: 500 TABLET ORAL at 11:08

## 2025-08-28 RX ADMIN — OXYCODONE HYDROCHLORIDE 10 MG: 10 TABLET ORAL at 05:08

## 2025-08-28 RX ADMIN — METHOCARBAMOL 1000 MG: 100 INJECTION INTRAMUSCULAR; INTRAVENOUS at 05:08

## 2025-08-29 ENCOUNTER — PATIENT OUTREACH (OUTPATIENT)
Dept: ADMINISTRATIVE | Facility: CLINIC | Age: 50
End: 2025-08-29
Payer: COMMERCIAL

## 2025-08-29 VITALS
HEIGHT: 71 IN | HEART RATE: 76 BPM | OXYGEN SATURATION: 96 % | TEMPERATURE: 98 F | RESPIRATION RATE: 18 BRPM | WEIGHT: 258.19 LBS | SYSTOLIC BLOOD PRESSURE: 134 MMHG | BODY MASS INDEX: 36.15 KG/M2 | DIASTOLIC BLOOD PRESSURE: 78 MMHG

## (undated) DEVICE — OBTURATOR BLADELESS 8MM XI CLR

## (undated) DEVICE — SOL ELECTROLUBE ANTI-STIC

## (undated) DEVICE — TROCAR ENDOPATH XCEL 5X100MM

## (undated) DEVICE — SUT CTD VICRYL 0 UND BR CT

## (undated) DEVICE — COVER LIGHT HANDLE

## (undated) DEVICE — ADHESIVE DERMABOND ADVANCED

## (undated) DEVICE — COVER TIP CURVED SCISSORS XI

## (undated) DEVICE — PENCIL ROCKER SWITCH 10FT CORD

## (undated) DEVICE — BLADE SURG CARBON STEEL SZ11

## (undated) DEVICE — SUT MCRYL PLUS 4-0 PS2 27IN

## (undated) DEVICE — SEE MEDLINE ITEM 157117

## (undated) DEVICE — BAG TISS RETRV MONARCH 10MM

## (undated) DEVICE — IRRIGATOR ENDOSCOPY DISP.

## (undated) DEVICE — DRAPE ARM DAVINCI XI

## (undated) DEVICE — TRAY CATH 1-LYR URIMTR 16FR

## (undated) DEVICE — DRAPE ABDOMINAL TIBURON 14X11

## (undated) DEVICE — DRAPE STERI INSTRUMENT 1018

## (undated) DEVICE — SUT V-LOC 90 GS22 2-0 VIO 23CM

## (undated) DEVICE — SUT 1 36IN PDS II VIO MONO

## (undated) DEVICE — DRAPE COLUMN DAVINCI XI

## (undated) DEVICE — ELECTRODE MEGADYNE RETURN DUAL

## (undated) DEVICE — KIT SURGIFLO HEMOSTATIC MATRIX

## (undated) DEVICE — SYR 10CC LUER LOCK

## (undated) DEVICE — GOWN SURGICAL X-LARGE

## (undated) DEVICE — TRAY MINOR GEN SURG OMC

## (undated) DEVICE — TOWEL OR DISP STRL BLUE 4/PK

## (undated) DEVICE — ELECTRODE REM PLYHSV RETURN 9

## (undated) DEVICE — DRAPE SCOPE PILLOW WARMER

## (undated) DEVICE — SUT PROLENE 4-0 RB-1 BL MO

## (undated) DEVICE — Device

## (undated) DEVICE — NDL INSUF ULTRA VERESS 120MM

## (undated) DEVICE — NDL HYPO REG 25G X 1 1/2

## (undated) DEVICE — GLOVE GAMMEX SURG LF PI SZ 7.5

## (undated) DEVICE — SYR 30CC LUER LOCK

## (undated) DEVICE — SUT 0 VICRYL / UR6 (J603)

## (undated) DEVICE — SEAL UNIVERSAL 5MM-8MM XI

## (undated) DEVICE — DISSECTOR SPACEMAKER + 10-12MM

## (undated) DEVICE — DRAPE INCISE IOBAN 2 23X17IN

## (undated) DEVICE — TUBE SET SINGLE LUMEN FILTERED

## (undated) DEVICE — SUT V-LOC ABSRB WOUND CLSR

## (undated) DEVICE — TAPE SILK 3IN

## (undated) DEVICE — NDL INSUFFLATION VERRES 120MM

## (undated) DEVICE — SET TRI-LUMEN FILTERED TUBE

## (undated) DEVICE — SOL CLEARIFY VISUALIZATION LAP

## (undated) DEVICE — CLIP HEMO-LOK ML

## (undated) DEVICE — SEE MEDLINE ITEM 152622

## (undated) DEVICE — SUT ABS CLIP LAPRA-TY CTD

## (undated) DEVICE — CLIP HEMO-LOK MLX LARGE LF

## (undated) DEVICE — KIT ANTIFOG

## (undated) DEVICE — SEAL CANN UNIVERSAL 5-12MM

## (undated) DEVICE — EVACUATOR WOUND BULB 100CC

## (undated) DEVICE — BINDER ABDOMINAL 9 30-45

## (undated) DEVICE — BAG TISSUE RETRIEVAL 5MM

## (undated) DEVICE — SUT ETHILON 2-0 PSLX 30IN

## (undated) DEVICE — GOWN SMART IMP BREATHABLE XXLG

## (undated) DEVICE — TRAY MINOR GEN SURG

## (undated) DEVICE — SYR SLIP TIP 20CC